# Patient Record
Sex: MALE | Race: BLACK OR AFRICAN AMERICAN | NOT HISPANIC OR LATINO | Employment: OTHER | URBAN - METROPOLITAN AREA
[De-identification: names, ages, dates, MRNs, and addresses within clinical notes are randomized per-mention and may not be internally consistent; named-entity substitution may affect disease eponyms.]

---

## 2018-07-03 ENCOUNTER — TELEPHONE (OUTPATIENT)
Dept: INTERNAL MEDICINE | Facility: CLINIC | Age: 63
End: 2018-07-03

## 2018-07-03 ENCOUNTER — LAB VISIT (OUTPATIENT)
Dept: LAB | Facility: HOSPITAL | Age: 63
End: 2018-07-03
Payer: MEDICARE

## 2018-07-03 ENCOUNTER — OFFICE VISIT (OUTPATIENT)
Dept: INTERNAL MEDICINE | Facility: CLINIC | Age: 63
End: 2018-07-03
Payer: MEDICARE

## 2018-07-03 VITALS
DIASTOLIC BLOOD PRESSURE: 80 MMHG | BODY MASS INDEX: 39.72 KG/M2 | SYSTOLIC BLOOD PRESSURE: 150 MMHG | HEIGHT: 67 IN | TEMPERATURE: 98 F | WEIGHT: 253.06 LBS

## 2018-07-03 DIAGNOSIS — R73.01 IMPAIRED FASTING GLUCOSE: ICD-10-CM

## 2018-07-03 DIAGNOSIS — K91.89 POSTCHOLECYSTECTOMY DIARRHEA: ICD-10-CM

## 2018-07-03 DIAGNOSIS — J31.0 CHRONIC RHINITIS: ICD-10-CM

## 2018-07-03 DIAGNOSIS — R63.4 UNINTENTIONAL WEIGHT LOSS: ICD-10-CM

## 2018-07-03 DIAGNOSIS — I10 HYPERTENSION, UNSPECIFIED TYPE: ICD-10-CM

## 2018-07-03 DIAGNOSIS — E78.2 MIXED HYPERLIPIDEMIA: ICD-10-CM

## 2018-07-03 DIAGNOSIS — R11.0 NAUSEA: ICD-10-CM

## 2018-07-03 DIAGNOSIS — Z11.59 NEED FOR HEPATITIS C SCREENING TEST: ICD-10-CM

## 2018-07-03 DIAGNOSIS — G47.30 SLEEP APNEA, UNSPECIFIED TYPE: ICD-10-CM

## 2018-07-03 DIAGNOSIS — E66.01 MORBID OBESITY: ICD-10-CM

## 2018-07-03 DIAGNOSIS — R19.7 POSTCHOLECYSTECTOMY DIARRHEA: ICD-10-CM

## 2018-07-03 DIAGNOSIS — Z00.00 ENCOUNTER FOR HEALTH MAINTENANCE EXAMINATION IN ADULT: Primary | ICD-10-CM

## 2018-07-03 DIAGNOSIS — Z23 NEED FOR TDAP VACCINATION: ICD-10-CM

## 2018-07-03 DIAGNOSIS — E87.6 HYPOKALEMIA: Primary | ICD-10-CM

## 2018-07-03 DIAGNOSIS — Z12.11 COLON CANCER SCREENING: ICD-10-CM

## 2018-07-03 LAB
ALBUMIN SERPL BCP-MCNC: 3.9 G/DL
ALP SERPL-CCNC: 50 U/L
ALT SERPL W/O P-5'-P-CCNC: 25 U/L
ANION GAP SERPL CALC-SCNC: 9 MMOL/L
AST SERPL-CCNC: 35 U/L
BASOPHILS # BLD AUTO: 0.04 K/UL
BASOPHILS NFR BLD: 0.9 %
BILIRUB SERPL-MCNC: 0.4 MG/DL
BILIRUB UR QL STRIP: NEGATIVE
BUN SERPL-MCNC: 17 MG/DL
CALCIUM SERPL-MCNC: 9.9 MG/DL
CHLORIDE SERPL-SCNC: 106 MMOL/L
CHOLEST SERPL-MCNC: 261 MG/DL
CHOLEST/HDLC SERPL: 5.2 {RATIO}
CLARITY UR REFRACT.AUTO: CLEAR
CO2 SERPL-SCNC: 24 MMOL/L
COLOR UR AUTO: NORMAL
CREAT SERPL-MCNC: 1.2 MG/DL
DIFFERENTIAL METHOD: ABNORMAL
EOSINOPHIL # BLD AUTO: 0.2 K/UL
EOSINOPHIL NFR BLD: 5.6 %
ERYTHROCYTE [DISTWIDTH] IN BLOOD BY AUTOMATED COUNT: 13.5 %
EST. GFR  (AFRICAN AMERICAN): >60 ML/MIN/1.73 M^2
EST. GFR  (NON AFRICAN AMERICAN): >60 ML/MIN/1.73 M^2
ESTIMATED AVG GLUCOSE: 128 MG/DL
GLUCOSE SERPL-MCNC: 107 MG/DL
GLUCOSE UR QL STRIP: NEGATIVE
HBA1C MFR BLD HPLC: 6.1 %
HCT VFR BLD AUTO: 37.3 %
HCV AB SERPL QL IA: NEGATIVE
HDLC SERPL-MCNC: 50 MG/DL
HDLC SERPL: 19.2 %
HGB BLD-MCNC: 12.2 G/DL
HGB UR QL STRIP: NEGATIVE
KETONES UR QL STRIP: NEGATIVE
LDLC SERPL CALC-MCNC: 175.2 MG/DL
LEUKOCYTE ESTERASE UR QL STRIP: NEGATIVE
LYMPHOCYTES # BLD AUTO: 1.2 K/UL
LYMPHOCYTES NFR BLD: 27.9 %
MCH RBC QN AUTO: 31.4 PG
MCHC RBC AUTO-ENTMCNC: 32.7 G/DL
MCV RBC AUTO: 96 FL
MONOCYTES # BLD AUTO: 0.4 K/UL
MONOCYTES NFR BLD: 10.1 %
NEUTROPHILS # BLD AUTO: 2.4 K/UL
NEUTROPHILS NFR BLD: 55.3 %
NITRITE UR QL STRIP: NEGATIVE
NONHDLC SERPL-MCNC: 211 MG/DL
NRBC BLD-RTO: 0 /100 WBC
PH UR STRIP: 7 [PH] (ref 5–8)
PLATELET # BLD AUTO: 272 K/UL
PMV BLD AUTO: 10.6 FL
POTASSIUM SERPL-SCNC: 3.2 MMOL/L
PROT SERPL-MCNC: 7.8 G/DL
PROT UR QL STRIP: NEGATIVE
RBC # BLD AUTO: 3.89 M/UL
SODIUM SERPL-SCNC: 139 MMOL/L
SP GR UR STRIP: 1.01 (ref 1–1.03)
T4 FREE SERPL-MCNC: 0.83 NG/DL
TRIGL SERPL-MCNC: 179 MG/DL
TSH SERPL DL<=0.005 MIU/L-ACNC: 0.3 UIU/ML
URN SPEC COLLECT METH UR: NORMAL
UROBILINOGEN UR STRIP-ACNC: NEGATIVE EU/DL
WBC # BLD AUTO: 4.26 K/UL

## 2018-07-03 PROCEDURE — 80061 LIPID PANEL: CPT

## 2018-07-03 PROCEDURE — 80053 COMPREHEN METABOLIC PANEL: CPT

## 2018-07-03 PROCEDURE — 85025 COMPLETE CBC W/AUTO DIFF WBC: CPT

## 2018-07-03 PROCEDURE — 81003 URINALYSIS AUTO W/O SCOPE: CPT

## 2018-07-03 PROCEDURE — 3079F DIAST BP 80-89 MM HG: CPT | Mod: CPTII,S$GLB,, | Performed by: NURSE PRACTITIONER

## 2018-07-03 PROCEDURE — 86803 HEPATITIS C AB TEST: CPT

## 2018-07-03 PROCEDURE — 99999 PR PBB SHADOW E&M-EST. PATIENT-LVL IV: CPT | Mod: PBBFAC,,, | Performed by: NURSE PRACTITIONER

## 2018-07-03 PROCEDURE — 3077F SYST BP >= 140 MM HG: CPT | Mod: CPTII,S$GLB,, | Performed by: NURSE PRACTITIONER

## 2018-07-03 PROCEDURE — 99214 OFFICE O/P EST MOD 30 MIN: CPT | Mod: S$GLB,,, | Performed by: NURSE PRACTITIONER

## 2018-07-03 PROCEDURE — 84443 ASSAY THYROID STIM HORMONE: CPT

## 2018-07-03 PROCEDURE — 36415 COLL VENOUS BLD VENIPUNCTURE: CPT

## 2018-07-03 PROCEDURE — 83036 HEMOGLOBIN GLYCOSYLATED A1C: CPT

## 2018-07-03 PROCEDURE — 84439 ASSAY OF FREE THYROXINE: CPT

## 2018-07-03 RX ORDER — IBUPROFEN 800 MG/1
800 TABLET ORAL DAILY
Refills: 3 | COMMUNITY
Start: 2018-05-09 | End: 2018-07-03 | Stop reason: ALTCHOICE

## 2018-07-03 RX ORDER — CHOLESTYRAMINE 4 G/9G
POWDER, FOR SUSPENSION ORAL
Qty: 60 G | Refills: 5 | Status: SHIPPED | OUTPATIENT
Start: 2018-07-03 | End: 2019-08-20 | Stop reason: SDUPTHER

## 2018-07-03 RX ORDER — POTASSIUM CHLORIDE 750 MG/1
10 CAPSULE, EXTENDED RELEASE ORAL DAILY
Qty: 30 CAPSULE | Refills: 2 | Status: SHIPPED | OUTPATIENT
Start: 2018-07-03 | End: 2019-01-09

## 2018-07-03 RX ORDER — AMLODIPINE BESYLATE 10 MG/1
10 TABLET ORAL DAILY
Qty: 90 TABLET | Refills: 1 | Status: SHIPPED | OUTPATIENT
Start: 2018-07-03 | End: 2018-07-23 | Stop reason: SDUPTHER

## 2018-07-03 RX ORDER — LOSARTAN POTASSIUM AND HYDROCHLOROTHIAZIDE 25; 100 MG/1; MG/1
TABLET ORAL
Qty: 90 TABLET | Refills: 1 | Status: SHIPPED | OUTPATIENT
Start: 2018-07-03 | End: 2018-07-23 | Stop reason: SDUPTHER

## 2018-07-03 RX ORDER — LOVASTATIN 40 MG/1
TABLET ORAL
Qty: 90 TABLET | Refills: 1 | Status: SHIPPED | OUTPATIENT
Start: 2018-07-03 | End: 2018-07-23

## 2018-07-03 RX ORDER — LOVASTATIN 40 MG/1
TABLET ORAL
Refills: 4 | COMMUNITY
Start: 2018-05-09 | End: 2018-07-03 | Stop reason: SDUPTHER

## 2018-07-03 RX ORDER — LOSARTAN POTASSIUM AND HYDROCHLOROTHIAZIDE 25; 100 MG/1; MG/1
TABLET ORAL
Refills: 4 | COMMUNITY
Start: 2018-05-09 | End: 2018-07-03 | Stop reason: SDUPTHER

## 2018-07-03 RX ORDER — AMLODIPINE BESYLATE 10 MG/1
10 TABLET ORAL DAILY
COMMUNITY
End: 2018-07-03 | Stop reason: SDUPTHER

## 2018-07-03 RX ORDER — IBUPROFEN 800 MG/1
800 TABLET ORAL DAILY
Qty: 30 TABLET | Refills: 3 | Status: CANCELLED | OUTPATIENT
Start: 2018-07-03

## 2018-07-03 NOTE — TELEPHONE ENCOUNTER
----- Message from Tina Novak DNP sent at 7/3/2018  1:19 PM CDT -----  Call pt with lab results. Cholesterol is high, total 261(goal < 200), (goal < 100), Triglycerides 179, goal < 150. Continue cholesterol med, follow low fat, low CHO, high fiber diet and exercise. A1C=6.1, prediabetes, follow low fat, low carb diet, limit starches and simple sugars to prevent the development of T2DM.    Thyroid function was a little off, will need to recheck this in 6 months.  CBC showed mild anemia, nothing concerning. Potassium was low on CMP but liver and kidney function normal. Sent in script for potassium 10meq daily.    F/U with new MD PCP as scheduled for further management.

## 2018-07-03 NOTE — PROGRESS NOTES
Subjective:       Patient ID: Isreal Randolph is a 62 y.o. male.    Chief Complaint: Weight Loss (20lbs in 3 weeks); Dizziness; and Nausea    Pt presents to clinic, new to me, for Physical. Last seen 8-21-14 by a Dr. Chong.    He has multiple complaints. He has not been seen in a while, had lost insurance since 4 yrs ago seeing PCP. Was getting meds filled at [facility].    He complaints of losing 30 lbs in 3 weeks, nausea, and dizziness. Denies abd pain, fever, or chills. Pt states he has an urge to vomit, but he hasn't. States it happens every morning.     Pt complaints of ringing in his ears for the last 2-3 weeks.      Review of Systems   Constitutional: Positive for unexpected weight change. Negative for activity change, appetite change, chills, diaphoresis, fatigue and fever.   HENT: Positive for tinnitus. Negative for ear discharge and ear pain.    Eyes: Negative.  Negative for visual disturbance.   Respiratory: Negative.  Negative for cough, shortness of breath and wheezing.    Cardiovascular: Negative.  Negative for palpitations and leg swelling.   Gastrointestinal: Positive for nausea. Negative for abdominal distention, abdominal pain, anal bleeding, blood in stool, constipation, diarrhea, rectal pain and vomiting.   Endocrine: Negative.  Negative for cold intolerance, heat intolerance, polydipsia, polyphagia and polyuria.   Genitourinary: Negative.  Negative for dysuria and hematuria.   Musculoskeletal: Positive for back pain.        Reports hx of sciatica with pain, was taking ibuprofen 800mg TID   Skin: Negative.  Negative for color change, pallor, rash and wound.   Allergic/Immunologic: Negative.  Negative for environmental allergies, food allergies and immunocompromised state.   Neurological: Positive for dizziness. Negative for weakness, numbness and headaches.   Hematological: Negative.  Negative for adenopathy. Does not bruise/bleed easily.   Psychiatric/Behavioral: Negative.  Negative  for behavioral problems and suicidal ideas.       Review of patient's allergies indicates:   Allergen Reactions    Lactose intolerance  [lactase]      Other reaction(s): Diarrhea    Penicillins Other (See Comments)     Swelling of lymph nodes       Current Outpatient Prescriptions:     aluminum hydroxide-magnesium carbonate (GAVISCON) suspension, Take 5 mLs by mouth every 6 (six) hours as needed., Disp: , Rfl:     amLODIPine (NORVASC) 10 MG tablet, Take 10 mg by mouth once daily., Disp: , Rfl:     cetirizine (ZYRTEC) 10 MG tablet, Take 10 mg by mouth once daily., Disp: , Rfl:     cholestyramine, with sugar, 4 gram Powd, MIX 1 PACKET IN AT LEAST 6 OUNCES OF LIQUID AND TAKE BY MOUTH TWICE DAILY WITH MEALS, Disp: 60 g, Rfl: 0    diphenhydrAMINE (BENADRYL) 25 mg capsule, Take by mouth. 1 Capsule Oral Every evening, Disp: , Rfl:     ibuprofen (ADVIL,MOTRIN) 800 MG tablet, Take 800 mg by mouth once daily., Disp: , Rfl: 3    losartan-hydrochlorothiazide 100-25 mg (HYZAAR) 100-25 mg per tablet, TAKE ONE(1) TABLET BY MOUTH EVERY DAY, Disp: , Rfl: 4    lovastatin (MEVACOR) 40 MG tablet, TAKE 1 TABLET BY ORAL ROUTE 1 TIME PER DAY AT BEDTIME FOR CHOLESTEROL, Disp: , Rfl: 4    Patient Active Problem List   Diagnosis    Hypertension    Sleep apnea    Postcholecystectomy diarrhea    Chronic rhinitis    Inguinal hernia    Mixed hyperlipidemia    Morbid obesity    Impaired fasting glucose       Past Medical History:   Diagnosis Date    Allergy     Back pain     Hypertension     Mixed hyperlipidemia 7/3/2018    Obesity     Reflux     Sleep apnea 6/21/2013     Past Surgical History:   Procedure Laterality Date    CHOLECYSTECTOMY      HERNIA REPAIR       Social History     Social History    Marital status:      Spouse name: N/A    Number of children: N/A    Years of education: N/A     Occupational History     Local      Social History Main Topics    Smoking status: Never Smoker    Smokeless  "tobacco: Never Used    Alcohol use Yes      Comment: 3 times per week    Drug use: No    Sexual activity: Yes     Other Topics Concern    None     Social History Narrative    None       Family History   Problem Relation Age of Onset    Cancer Mother     Heart disease Father          Objective:     Vitals:    07/03/18 0750 07/03/18 0823   BP: (!) 158/82 (!) 150/80   Temp: 98.1 °F (36.7 °C)    Weight: 114.8 kg (253 lb 1.4 oz)    Height: 5' 6.5" (1.689 m)    PainSc: 0-No pain        Body mass index is 40.24 kg/m².    Physical Exam   Constitutional: He is oriented to person, place, and time. He appears well-developed and well-nourished.   Morbidly obese   HENT:   Head: Normocephalic.   Right Ear: Hearing, tympanic membrane, external ear and ear canal normal.   Left Ear: Hearing, tympanic membrane, external ear and ear canal normal.   Eyes: Conjunctivae, EOM and lids are normal. Pupils are equal, round, and reactive to light. Lids are everted and swept, no foreign bodies found.   Neck: Trachea normal, normal range of motion and full passive range of motion without pain. Neck supple. No JVD present. Carotid bruit is not present.   Cardiovascular: Normal rate, regular rhythm, normal heart sounds, intact distal pulses and normal pulses.    Pulmonary/Chest: Effort normal and breath sounds normal.   Abdominal: Soft. Normal appearance and bowel sounds are normal. He exhibits no distension and no mass. There is no hepatosplenomegaly. There is no tenderness. There is no rebound, no guarding and no CVA tenderness. No hernia.   obese   Musculoskeletal: Normal range of motion.   Neurological: He is alert and oriented to person, place, and time.   Skin: Skin is warm, dry and intact. Capillary refill takes less than 2 seconds.   Psychiatric: He has a normal mood and affect. His speech is normal and behavior is normal. Judgment and thought content normal. Cognition and memory are normal.   Vitals reviewed.      Assessment: "     1. Encounter for health maintenance examination in adult     2. Mixed hyperlipidemia  Lipid panel   3. Hypertension, unspecified type  CBC auto differential    Comprehensive metabolic panel    TSH    Urinalysis   4. Postcholecystectomy diarrhea  Case request GI: COLONOSCOPY   5. Chronic rhinitis     6. Sleep apnea, unspecified type     7. Morbid obesity     8. BMI 40.0-44.9, adult     9. Unintentional weight loss  Hemoglobin A1c   10. Nausea     11. Need for hepatitis C screening test  Hepatitis C antibody   12. Colon cancer screening  Case request GI: COLONOSCOPY   13. Need for Tdap vaccination     14. Impaired fasting glucose  Hemoglobin A1c       Plan:     Isreal was seen today for weight loss, dizziness and nausea.    Diagnoses and all orders for this visit:    Encounter for health maintenance examination in adult  Exam done    Health Maintenance updated    Mixed hyperlipidemia  -     Lipid panel; Future   Refilled meds    Continue cholesterol med, low fat diet, and exercise. Check lipids.    Hypertension, unspecified type  Elevated    Refilled meds    Take medications as prescribed.    Monitor BP at home, goal BP < or = 140/80, call office if consistently above this range.    Follow low salt DASH diet and exercise.    BMI reviewed.    Go to ED if Headaches, blurred vision, chest pain, or SOB occurs along with elevated readings > or = 160/90.      Postcholecystectomy diarrhea  -     Case request GI: COLONOSCOPY    Referred to GI    Chronic rhinitis  Continue otc flonase and Zyrtec prn, allergen avoidance discussed    Sleep apnea, unspecified type  Stable, on CPAP    Morbid obesity  BMI reviewed.    Diet and exercise to lose weight.    BMI 40.0-44.9, adult  BMI reviewed.    Diet and exercise to lose weight.    Unintentional weight loss  -     Hemoglobin A1c; Future  Referred to GI    Nausea  Clear Liquid diet, advance to BRAT as tolerated    Referred to GI  Need for hepatitis C screening test  -      Hepatitis C antibody; Future    Colon cancer screening  -     Case request GI: COLONOSCOPY    Need for Tdap vaccination  Given in pharmacy    Impaired fasting glucose  -     Hemoglobin A1c; Future    Labs ordered and drawn today, will call with results    Need f/u with MD to establish PCP, Dr. Hills in 2 weeks    Colonoscopy due, ordered--to find out if any colon causes of nausea as this screening is overdue    Follow low residue diet, clear liquids, until you can tolerate soft foods for nausea    Stop Ibuprofen for pain, take Tylenol 500mg prn pain    Tdap given in pharmacy    Refilled meds

## 2018-07-03 NOTE — PROGRESS NOTES
Call pt with lab results. Cholesterol is high, total 261(goal < 200), (goal < 100), Triglycerides 179, goal < 150. Continue cholesterol med, follow low fat, low CHO, high fiber diet and exercise. A1C=6.1, prediabetes, follow low fat, low carb diet, limit starches and simple sugars to prevent the development of T2DM.    Thyroid function was a little off, will need to recheck this in 6 months.  CBC showed mild anemia, nothing concerning. Potassium was low on CMP but liver and kidney function normal. Sent in script for potassium 10meq daily.    F/U with new MD PCP as scheduled for further management.

## 2018-07-03 NOTE — PATIENT INSTRUCTIONS
Labs ordered and drawn today, will call with results    Need f/u with MD to establish PCP, Dr. Hills in 2 weeks    Colonoscopy due, ordered--to find out if any colon causes of nausea as this screening is overdue    Follow low residue diet, clear liquids, until you can tolerate soft foods for nausea    Stop Ibuprofen for pain, take Tylenol 500mg prn pain    Tdap given in pharmacy    Refilled meds

## 2018-07-10 ENCOUNTER — TELEPHONE (OUTPATIENT)
Dept: INTERNAL MEDICINE | Facility: CLINIC | Age: 63
End: 2018-07-10

## 2018-07-23 ENCOUNTER — OFFICE VISIT (OUTPATIENT)
Dept: INTERNAL MEDICINE | Facility: CLINIC | Age: 63
End: 2018-07-23
Attending: INTERNAL MEDICINE
Payer: MEDICARE

## 2018-07-23 VITALS
DIASTOLIC BLOOD PRESSURE: 74 MMHG | SYSTOLIC BLOOD PRESSURE: 130 MMHG | HEIGHT: 67 IN | WEIGHT: 249.13 LBS | BODY MASS INDEX: 39.1 KG/M2 | HEART RATE: 48 BPM

## 2018-07-23 DIAGNOSIS — R79.89 LOW TSH LEVEL: ICD-10-CM

## 2018-07-23 DIAGNOSIS — I10 ESSENTIAL HYPERTENSION: ICD-10-CM

## 2018-07-23 DIAGNOSIS — G89.29 CHRONIC MIDLINE LOW BACK PAIN WITH LEFT-SIDED SCIATICA: ICD-10-CM

## 2018-07-23 DIAGNOSIS — E78.2 MIXED HYPERLIPIDEMIA: ICD-10-CM

## 2018-07-23 DIAGNOSIS — R00.1 BRADYCARDIA: Primary | ICD-10-CM

## 2018-07-23 DIAGNOSIS — E66.01 SEVERE OBESITY (BMI 35.0-39.9) WITH COMORBIDITY: ICD-10-CM

## 2018-07-23 DIAGNOSIS — E87.6 HYPOKALEMIA: ICD-10-CM

## 2018-07-23 DIAGNOSIS — R00.1 SINUS BRADYCARDIA: ICD-10-CM

## 2018-07-23 DIAGNOSIS — M54.42 CHRONIC MIDLINE LOW BACK PAIN WITH LEFT-SIDED SCIATICA: ICD-10-CM

## 2018-07-23 PROCEDURE — 93005 ELECTROCARDIOGRAM TRACING: CPT | Mod: S$GLB,,, | Performed by: INTERNAL MEDICINE

## 2018-07-23 PROCEDURE — 3008F BODY MASS INDEX DOCD: CPT | Mod: CPTII,S$GLB,, | Performed by: INTERNAL MEDICINE

## 2018-07-23 PROCEDURE — 99215 OFFICE O/P EST HI 40 MIN: CPT | Mod: 25,S$GLB,, | Performed by: INTERNAL MEDICINE

## 2018-07-23 PROCEDURE — 3078F DIAST BP <80 MM HG: CPT | Mod: CPTII,S$GLB,, | Performed by: INTERNAL MEDICINE

## 2018-07-23 PROCEDURE — 93010 ELECTROCARDIOGRAM REPORT: CPT | Mod: S$GLB,,, | Performed by: INTERNAL MEDICINE

## 2018-07-23 PROCEDURE — 99999 PR PBB SHADOW E&M-EST. PATIENT-LVL III: CPT | Mod: PBBFAC,,, | Performed by: INTERNAL MEDICINE

## 2018-07-23 PROCEDURE — 3075F SYST BP GE 130 - 139MM HG: CPT | Mod: CPTII,S$GLB,, | Performed by: INTERNAL MEDICINE

## 2018-07-23 RX ORDER — LOSARTAN POTASSIUM AND HYDROCHLOROTHIAZIDE 25; 100 MG/1; MG/1
TABLET ORAL
Qty: 90 TABLET | Refills: 3 | Status: SHIPPED | OUTPATIENT
Start: 2018-07-23 | End: 2019-01-09

## 2018-07-23 RX ORDER — ATORVASTATIN CALCIUM 80 MG/1
80 TABLET, FILM COATED ORAL DAILY
Qty: 90 TABLET | Refills: 3 | Status: SHIPPED | OUTPATIENT
Start: 2018-07-23 | End: 2019-11-18 | Stop reason: ALTCHOICE

## 2018-07-23 RX ORDER — AMLODIPINE BESYLATE 10 MG/1
10 TABLET ORAL DAILY
Qty: 90 TABLET | Refills: 3 | Status: SHIPPED | OUTPATIENT
Start: 2018-07-23 | End: 2020-01-06 | Stop reason: SDUPTHER

## 2018-07-23 RX ORDER — IBUPROFEN 800 MG/1
800 TABLET ORAL 3 TIMES DAILY PRN
Qty: 60 TABLET | Refills: 3 | Status: SHIPPED | OUTPATIENT
Start: 2018-07-23 | End: 2018-08-02

## 2018-07-23 RX ORDER — LOVASTATIN 40 MG/1
TABLET ORAL
Qty: 90 TABLET | Refills: 1 | Status: CANCELLED | OUTPATIENT
Start: 2018-07-23

## 2018-07-23 NOTE — PROGRESS NOTES
"Subjective:       Patient ID: Isreal Randolph is a 62 y.o. male.    Chief Complaint: Establish Care    HPI    First visit with me. without insurance last 3 years. Dx with sciatica. Was seen at outside clinic. Was 280#, now in 240s. Has been out of blood pressure meds and cholesterol meds. Reports obstructive sleep apnea resolved after weight loss.     Reports using ibuprofen for treatment of sciatica. Uses as needed, not all the time. Takes Gaveston over-the-counter for GERD, not too much of a problem.    Bradycardia hasn't been a problem in the past. Pt is not weak or dizzy. occasionally lightheadedness when working outside and dehydration.    Review of Systems   All other systems reviewed and are negative.        Objective:      Physical Exam   Constitutional: He is oriented to person, place, and time. No distress.   -American man whose Body mass index is 39.61 kg/m².    Cardiovascular: An irregularly irregular rhythm present. Bradycardia present.  Exam reveals distant heart sounds.    Pulmonary/Chest: Effort normal and breath sounds normal. He has no wheezes. He has no rales.   Neurological: He is alert and oriented to person, place, and time.   Skin: Skin is warm and dry. No rash noted.   Psychiatric: He has a normal mood and affect. His behavior is normal.   Nursing note and vitals reviewed.      Vitals:    07/23/18 1020   BP: 130/74   BP Location: Right arm   Patient Position: Sitting   BP Method: Large (Manual)   Pulse: (!) 48   Weight: 113 kg (249 lb 1.9 oz)   Height: 5' 6.5" (1.689 m)     Body mass index is 39.61 kg/m².    RESULTS: Reviewed labs from last 1 months. I have personally inspected the EKG performed today.     Assessment:       1. Bradycardia    2. Mixed hyperlipidemia    3. Essential hypertension    4. Chronic midline low back pain with left-sided sciatica    5. Low TSH level    6. Hypokalemia    7. Severe obesity (BMI 35.0-39.9) with comorbidity    8. Sinus bradycardia        Plan: "   Isreal was seen today for establish care.    Diagnoses and all orders for this visit:    Bradycardia:  New problem. EKG shows sinus bradycardia, no sign of heart block. Send to lab to recheck electrolytes. Refer to Cardiology.  -     EKG 12-lead    Mixed hyperlipidemia:  Prior dx, not well controlled, no longer on meds. Change lovastatin to atorvastatin, recheck levels next visit.  -     atorvastatin (LIPITOR) 80 MG tablet; Take 1 tablet (80 mg total) by mouth once daily.  -     Lipid panel; Future    Essential hypertension:  Prior dx, reports has been well controlled on prior management but now out of meds, restart and if low blood pressure develops please notify the office.  -     amLODIPine (NORVASC) 10 MG tablet; Take 1 tablet (10 mg total) by mouth once daily.  -     losartan-hydrochlorothiazide 100-25 mg (HYZAAR) 100-25 mg per tablet; TAKE ONE(1) TABLET BY MOUTH EVERY DAY  -     Magnesium; Future  -     Comprehensive metabolic panel; Future    Chronic midline low back pain with left-sided sciatica:  Prior diagnosis, well controlled on current management. Continue ibuprofen PRN. Continue weight loss.   -     ibuprofen (ADVIL,MOTRIN) 800 MG tablet; Take 1 tablet (800 mg total) by mouth 3 (three) times daily as needed (sciatica).    Low TSH level:  New problem, seen on recent labs. Free T4 normal, no symptoms of hyperthyroidism. Recheck next set of labs.  -     TSH; Future    Hypokalemia:  New problem, seen on recent labs, restart ARB with HCTZ and recheck levels with next labs along with Mg.    Severe obesity:  Wt coming down with some more exercise. Pt wants to lose more wt, refer to health .    Other orders  -     Cancel: lovastatin (MEVACOR) 40 MG tablet; TAKE 1 TABLET BY ORAL ROUTE 1 TIME PER DAY AT BEDTIME FOR CHOLESTEROL    Follow-up in about 3 months (around 10/23/2018) for fasting labs 1 week prior, HOLLY Novak.  Donald Hills MD  Internal Medicine    Portions of this note were completed  using medical dictation software. Please excuse typographical or syntax errors that were missed on review.

## 2018-07-24 ENCOUNTER — OFFICE VISIT (OUTPATIENT)
Dept: CARDIOLOGY | Facility: CLINIC | Age: 63
End: 2018-07-24
Payer: MEDICARE

## 2018-07-24 VITALS
HEIGHT: 66 IN | HEART RATE: 50 BPM | WEIGHT: 252.19 LBS | SYSTOLIC BLOOD PRESSURE: 130 MMHG | DIASTOLIC BLOOD PRESSURE: 80 MMHG | BODY MASS INDEX: 40.53 KG/M2

## 2018-07-24 DIAGNOSIS — R00.1 SINUS BRADYCARDIA: Primary | ICD-10-CM

## 2018-07-24 DIAGNOSIS — R73.01 IMPAIRED FASTING GLUCOSE: ICD-10-CM

## 2018-07-24 DIAGNOSIS — E66.01 SEVERE OBESITY (BMI 35.0-39.9) WITH COMORBIDITY: ICD-10-CM

## 2018-07-24 DIAGNOSIS — I10 ESSENTIAL HYPERTENSION: ICD-10-CM

## 2018-07-24 DIAGNOSIS — G47.30 SLEEP APNEA, UNSPECIFIED TYPE: ICD-10-CM

## 2018-07-24 PROCEDURE — 99999 PR PBB SHADOW E&M-EST. PATIENT-LVL III: CPT | Mod: PBBFAC,,, | Performed by: INTERNAL MEDICINE

## 2018-07-24 PROCEDURE — 99204 OFFICE O/P NEW MOD 45 MIN: CPT | Mod: S$GLB,,, | Performed by: INTERNAL MEDICINE

## 2018-07-24 PROCEDURE — 3079F DIAST BP 80-89 MM HG: CPT | Mod: CPTII,S$GLB,, | Performed by: INTERNAL MEDICINE

## 2018-07-24 PROCEDURE — 3008F BODY MASS INDEX DOCD: CPT | Mod: CPTII,S$GLB,, | Performed by: INTERNAL MEDICINE

## 2018-07-24 PROCEDURE — 3075F SYST BP GE 130 - 139MM HG: CPT | Mod: CPTII,S$GLB,, | Performed by: INTERNAL MEDICINE

## 2018-07-24 PROCEDURE — 99499 UNLISTED E&M SERVICE: CPT | Mod: S$GLB,,, | Performed by: INTERNAL MEDICINE

## 2018-07-24 NOTE — PROGRESS NOTES
Subjective:   Patient ID:  Isreal Randolph is a 62 y.o. male who presents for evaluation of Bradycardia      HPI: Very pleasant man with severe obesity, HTN, dyslipidemia, and impaired fasting glucose here for evaluation of his slow heart rates.  He has ECGs with sinus stephanie at 44.  He feels perfectly well - in fact, he feels better lately with more weight loss than he has in a while.  He's had no new lightheadedness or syncope, and no new HOFFMAN.  No angina, PND, or orthopnea.  He takes no SA or AV carlito agents.    He was recently started on atorvastatin 80 for an LDL of 175-205 that had long been untreated.  He's recently come off of amlodipine as his BP responds to weight loss.    Past Medical History:   Diagnosis Date    Allergy     Back pain     Hypertension     Mixed hyperlipidemia 7/3/2018    Obesity     Reflux     Sleep apnea 6/21/2013       Past Surgical History:   Procedure Laterality Date    CHOLECYSTECTOMY      HERNIA REPAIR         Social History   Substance Use Topics    Smoking status: Never Smoker    Smokeless tobacco: Never Used    Alcohol use Yes      Comment: 3 times per week       Family History   Problem Relation Age of Onset    Cancer Mother     Heart disease Father        Current Outpatient Prescriptions   Medication Sig    aluminum hydroxide-magnesium carbonate (GAVISCON) suspension Take 5 mLs by mouth every 6 (six) hours as needed.    atorvastatin (LIPITOR) 80 MG tablet Take 1 tablet (80 mg total) by mouth once daily.    cetirizine (ZYRTEC) 10 MG tablet Take 10 mg by mouth once daily.    cholestyramine, with sugar, 4 gram Powd MIX 1 PACKET IN AT LEAST 6 OUNCES OF LIQUID AND TAKE BY MOUTH TWICE DAILY WITH MEALS    diphenhydrAMINE (BENADRYL) 25 mg capsule Take by mouth. 1 Capsule Oral Every evening    ibuprofen (ADVIL,MOTRIN) 800 MG tablet Take 1 tablet (800 mg total) by mouth 3 (three) times daily as needed (sciatica).    losartan-hydrochlorothiazide 100-25 mg (HYZAAR) 100-25  mg per tablet TAKE ONE(1) TABLET BY MOUTH EVERY DAY    potassium chloride (MICRO-K) 10 MEQ CpSR Take 1 capsule (10 mEq total) by mouth once daily.    amLODIPine (NORVASC) 10 MG tablet Take 1 tablet (10 mg total) by mouth once daily.    diphth,pertus,acell,,tetanus (BOOSTRIX) 2.5-8-5 Lf-mcg-Lf/0.5mL Syrg injection Inject into the muscle.     No current facility-administered medications for this visit.        Review of patient's allergies indicates:   Allergen Reactions    Lactose intolerance  [lactase]      Other reaction(s): Diarrhea    Penicillins Other (See Comments)     Swelling of lymph nodes       Review of Systems   Constitution: Negative.   HENT: Negative.    Eyes: Negative.    Cardiovascular: Negative.  Negative for chest pain, dyspnea on exertion, near-syncope, orthopnea and palpitations.   Respiratory: Negative.  Negative for cough, hemoptysis and shortness of breath.    Endocrine: Negative.    Hematologic/Lymphatic: Negative.    Skin: Negative.    Musculoskeletal: Negative.    Gastrointestinal: Negative.    Genitourinary: Negative.    Neurological: Negative.    Psychiatric/Behavioral: Negative.      Objective:   Physical Exam   Constitutional: He is oriented to person, place, and time. He appears well-developed and well-nourished.   HENT:   Head: Normocephalic and atraumatic.   Mouth/Throat: Oropharynx is clear and moist.   Eyes: Conjunctivae and EOM are normal. No scleral icterus.   Neck: Normal range of motion. Neck supple. No JVD present.   Cardiovascular: Regular rhythm, normal heart sounds and intact distal pulses.  Exam reveals no gallop and no friction rub.    No murmur heard.  Rate = 50bpm     Pulmonary/Chest: Effort normal and breath sounds normal. He has no wheezes. He has no rales.   Abdominal: Soft. Bowel sounds are normal. He exhibits no distension. There is no tenderness.   Musculoskeletal: Normal range of motion. He exhibits no edema.   Neurological: He is alert and oriented to person,  place, and time.   Skin: Skin is warm and dry. No rash noted. No erythema.   Psychiatric: He has a normal mood and affect. His behavior is normal. Judgment and thought content normal.   Vitals reviewed.      Lab Results   Component Value Date    WBC 4.26 07/03/2018    HGB 12.2 (L) 07/03/2018    HCT 37.3 (L) 07/03/2018    MCV 96 07/03/2018     07/03/2018         Chemistry        Component Value Date/Time     07/23/2018 1112    K 3.3 (L) 07/23/2018 1112     07/23/2018 1112    CO2 26 07/23/2018 1112    BUN 25 (H) 07/23/2018 1112    CREATININE 1.6 (H) 07/23/2018 1112     07/23/2018 1112        Component Value Date/Time    CALCIUM 9.6 07/23/2018 1112    ALKPHOS 50 (L) 07/03/2018 0839    AST 35 07/03/2018 0839    ALT 25 07/03/2018 0839    BILITOT 0.4 07/03/2018 0839    ESTGFRAFRICA 52 (A) 07/23/2018 1112    EGFRNONAA 45 (A) 07/23/2018 1112            Lab Results   Component Value Date    CHOL 261 (H) 07/03/2018    CHOL 253 (H) 03/19/2010    CHOL 281 (H) 01/12/2009     Lab Results   Component Value Date    HDL 50 07/03/2018    HDL 51 03/19/2010    HDL 53 01/12/2009     Lab Results   Component Value Date    LDLCALC 175.2 (H) 07/03/2018    LDLCALC 181.0 (H) 03/19/2010    LDLCALC 205.2 (H) 01/12/2009     Lab Results   Component Value Date    TRIG 179 (H) 07/03/2018    TRIG 105 03/19/2010    TRIG 114 01/12/2009     Lab Results   Component Value Date    CHOLHDL 19.2 (L) 07/03/2018    CHOLHDL 20.2 03/19/2010    CHOLHDL 18.9 (L) 01/12/2009       Lab Results   Component Value Date    TSH 0.305 (L) 07/03/2018       Lab Results   Component Value Date    HGBA1C 6.1 (H) 07/03/2018     I reviewed his ECGs and they are entirely normal except for the bradycardia.  Specifically, the QRS is narrow and there is no evidence of any heart block.    Assessment:     1. Sinus bradycardia    2. Essential hypertension    3. Sleep apnea, unspecified type    4. Severe obesity (BMI 35.0-39.9) with comorbidity    5. Impaired  fasting glucose        Plan:     Reassurance.  Certainly, if he were to develop exercise intolerance, new HOFFMAN, or lightheadedness/syncope, we'd have to investigate further.  But his normal NH interval, QRS duration, and axis are reassuring.    Continue current medicines.    Diet/exercise goals reinforced.    F/U PRN

## 2018-07-24 NOTE — LETTER
July 24, 2018      Donald Hills MD  [address]  [place] [address]           [facility] - Cardiology  [address]  [place] [address]  Phone: [phone]          Patient: Isreal Randolph   MR Number: [id number]   YOB: 1955   Date of Visit: 7/24/2018       Dear Dr. Donald Hills:    Thank you for referring Isreal Randolph to me for evaluation. Attached you will find relevant portions of my assessment and plan of care.    If you have questions, please do not hesitate to call me. I look forward to following Isreal Randolph along with you.    Sincerely,    Virgilio Hernandez MD    Enclosure  CC:  No Recipients    If you would like to receive this communication electronically, please contact [email or web address] or [phone] to request more information on [employer] Link access.    For providers and/or their staff who would like to refer a patient to [facility], please contact us through our one-stop-shop provider referral line, [facility] , at [phone].    If you feel you have received this communication in error or would no longer like to receive these types of communications, please e-mail [email or web address]

## 2018-07-25 ENCOUNTER — PATIENT MESSAGE (OUTPATIENT)
Dept: INTERNAL MEDICINE | Facility: CLINIC | Age: 63
End: 2018-07-25

## 2018-07-25 NOTE — TELEPHONE ENCOUNTER
Please call the patient to notify that his potassium is low but the magnesium is high. I recommend he cut back on Gaviscon to no more than once daily. He also has some decrease in kidney function. I have sent the patient a [facility] message.

## 2018-07-26 ENCOUNTER — TELEPHONE (OUTPATIENT)
Dept: INTERNAL MEDICINE | Facility: CLINIC | Age: 63
End: 2018-07-26

## 2018-07-26 NOTE — TELEPHONE ENCOUNTER
----- Message from Donadl Hills MD sent at 7/23/2018 10:49 AM CDT -----  Regarding: please contact for health coaching  Calos Fay,    Please contact this patient to set up an appointment to meet for Health Coaching. Their major goal is weight loss. Please let me know if there are other questions. Thanks!    Donald

## 2018-07-26 NOTE — TELEPHONE ENCOUNTER
..Patient referred by Dr. Hills for Health coaching (weight loss, lifestyle changes).  Called patient and gave an explanation about Health Coaching program and invited participation.   Patient states he would like to participate.  Set appointment for 8/6/18 at 0900.   Gave direct contact number to call if he/ she has any questions [phone].   Nya Bridges RN  Health

## 2018-08-06 ENCOUNTER — CLINICAL SUPPORT (OUTPATIENT)
Dept: INTERNAL MEDICINE | Facility: CLINIC | Age: 63
End: 2018-08-06
Payer: MEDICARE

## 2018-08-06 VITALS — WEIGHT: 247.81 LBS | BODY MASS INDEX: 40 KG/M2

## 2018-08-06 NOTE — PROGRESS NOTES
Date:    8/6/18                  Time: 0900  Initial Health  Contact - [x]Clinic visit  []  Phone Visit  ASSEMENT: Information obtained through combination of chart review prior to seeing patient, patient self-report.   Primary Referral diagnosis/reason for referral:    Weight loss       (See physician progress note for complete list of diagnoses.)  PCP:   Dr. Hills     Referent :  [x] PCP       [] Transition Navigator    []  E.DAngela    []  APRN  []  Other:     Supports:   Family       Preferred Learning Style  (may be a combination)  [x]  Visual (pictures/ video)     [] Auditory/ Listening   []  Reading    []  Hands-on/ Demonstration   []  1:1    []  Group        []  Alone       []  No preference   []  Combination  []  Other:         Presenting issues from patients point of view:  [x]  Improve nutrition/increase healthy choices.                    []  Improve /maintain current functioning.  [x]  Obtain and maintain healthy blood pressure.                  []  Stop smoking.  [x]  Improve weight management.                                       [x]  Lower cholesterol.  []  Improve blood glucose management.                            []  Improve breathing.  []  Increase energy level.                                                      []  Increase/maintain independence.   []  Improve sleep.                                                                []  Decrease stress.  []  Improve pain management.                                             []  Improve mood.  []  Other:                  Pt. Education Level:     BA Theology     Disease specific education services attended:   no      Patient Employed:  []yes    [x] No  Where ___Retired disability__________________  Days and Hours:                Current Self-help Behaviors  []  Checks glucose level        times a day.  []  Tries to modify meals so more healthy.  []  Exercises by        []  Takes BP        times a       (insert frequency)  [x]  Weighs self    3 x  week     (how often)  [x]  Takes all medications as prescribed.  []  Rarely misses health care appointments.  []  Sleeps 8 hours without waking more than twice.  []  Consistently wears/uses functioning aids as recommended  (ie:  Contacts [] , glasses [] , hearing  Aid [] , prosthesis [] ,  Walker [] ,  Wheelchair []  etc.)  []  Regularly engages in stress management activities I.e.:  []  Quit smoking   [x]  Purposefully educates self about health issues.  []  Pt did bring glucose log with him/her.  []  Other  (explain)           []  Comments:       []  Reported Blood Sugar Readings:          Health screenings   Last PCP visit: 7/23/18                                                PSA: 3/19/2010          Colon: 7/3/18   Lipids: 7/3/18   CMP: 7/3/18   HgA1C: 7/3/18   Urine Microalbumin-Creatinine:  3/19/10           Eye Exam:   [facility] 7/2018 Dr. Duff       Strengths:  [x]  Confident                       [x]  Determined/persistent           []  Enthusiastic         []  Good problem solving           [x]  Good self-control                   []  Hard working        []  Hopeful/optimistic                  []  Intelligent                                []  Self aware  []  Creative                                 [x]  Flexible          [x]  Sense of humor                     []  Open/willing          [x]  Spiritual                                  [x] Values health    []  Successful overcoming difficult challenges in the past.     [x]  Pos support network  [x]   Health literate (The degree to which individuals have the capacity to obtain, process, and    understand basic health information and services needed to make appropriate health decisions.- Dept. of Health and Human services.)  []   Other Comments:             Change Markers  Scale 0-10 with 10 representing most Ready 0 least ready, 10 most important 0 least important 10 most confident 0 least confident.     [] NA at this time.    Readiness:  10   ;  Importance: 10    ;  Confidence:  10      INTERVENTIONS:  [x] Given an explanation about health coaching program and invited participation.  [x] Listened reflectively; provided support, encouragement, and validation; respected  and maintained patient self-direction; explored pros/cons of change; personalized health risks of maintaining the status quo; and facilitated recognition of discrepancy between current behaviors and patients goals and values.  [x] Assessed stage of change and employed appropriate motivational interviewing strategies to facilitate movement toward the next stage of change and healthy behavior modification.  [x] Normalized occurrence of relapse, helped patient recognize outcome of new self-learning as a result of the relapse such as triggers, and helped focus on factors to reduce chances of returning to previous behaviors .  [x] Used readiness scale ratings to guide assessment of importance and confidence of successfully reaching goals.  [x] Assisted patient to develop goal, action plan, and address potential barriers to goal     achievement.  [] Patient was given a new (insert glucose meter or other supplies), taught to use, and      successfully demonstrated ability to carry out essential steps of process.    [] Rx for ( monitor/supplies, pen needles, etc.) was obtained.  [x] Provided educational review, written materials/handouts (Meal Planning Counting Carbs, Healthy Plate, 10 Rules for Eating Safely for Life, 10 Tips to Cutting Your Cravings, Probiotics).   [x] Topics discussed/provided:    GI of foods and how it affects your metabolism and helps you burn fat, carb counting    [x] Facilitated completion of needed exams and screenings by reviewing Diabetic/Health Maintenance Report Card with patient.  [x] Provided pt with my business card.  [x] Informed of/reviewed how to access health  and after hours assistance.  [x] Discussed, planned, and scheduled future  contacts.  [x]Challenges/Barriers identified discussed as identified by patient.  [x] Needs identified by this Health :    Education and support     [] Other:            For additional care management support patient was referred to:        []  Community resources for                        []  Medication assistance programs               []  Dietician              []  Diabetes  [employer]                                        []  Mental health services                           []                  []  Diabetes [place]                                              []  Home health services                                []  Complex case management              []  PCP/covering provider due to                 []  Emergency Dept.                                  []  GYN              []  Optometrist/ Ophthalmologist                          []  Podiatrist                                                      [x]  N/A        []  Other:           RESPONSE/IMPRESSION  Behavior is consistent with the following stage of change:  []  Pre-contemplation  []  Contemplation  [x]  Preparation  []  Action  []  Maintenance  []  Relapse    Level of participation:  []  Refused to participate  []  Guarded / resistant  []  Passive participant  [x]  Active participant/interactive  [x]  Interested/receptive  [x]  Self-motivated  []  Other          Goal developed by patient today:    5 month goal to lose 30 lbs by 12/16/18 10/10  Read every label 7/7  More veggies and fruits (blueberries) 10/10  More water 5 bottles  Day 10/10    Plan (needed actions to accomplish goal):          [x] Pt will work on action plan as stated above.        [x] Pt will follow up with Health  on   8/27/18 at 0800.     [x] Health  will remain available.  [x] Health  will maintain regular contacts with patient to educate, support, encourage; help problem-solve; assist with development of self-advocacy/increasing independent health  management; and provide resources as needed.  [] Pt has declined Health  Program at this time. Provided pt with Health  Program information should they decide to participate at a later time.        [] Pt to facilitate contact with Health        [] Health  to facilitate contact with patient.        [] Other:          Notes:   Met with patient he is interested in weight loss, decrease BP, decrease meds if possible.   He was at a weight loss clinic and lost 80 lbs and had to have his gallbladder removed now is at a plateau.   His best reasons for change are to live longer, its more expensive to be overweight and wants to be around for his kids, and wife, has a 18 yr old daughter in college.   Goals set by patient and will continue to work with him to assist to reach his goals.     Best Method of Contact:  [] email:   [x] Phone:   [] Mail  [] Office     Nya Bridges RN

## 2018-08-27 ENCOUNTER — CLINICAL SUPPORT (OUTPATIENT)
Dept: INTERNAL MEDICINE | Facility: CLINIC | Age: 63
End: 2018-08-27
Payer: MEDICARE

## 2018-08-27 VITALS — BODY MASS INDEX: 39.14 KG/M2 | WEIGHT: 242.5 LBS

## 2018-08-27 NOTE — PROGRESS NOTES
Health  Follow-Up Note   [x] Office  [] Phone  Notes from previous session reviewed.   [x] Previous Session Goals unchanged.   [] Patient/Caregiver Change in Goals.  Goals added or changed by Patient/Caregiver since program participation:  1.  Continue same plan   2. Get back on track now that daughter went back to school        Additional/Changed support that patient/caregiver has experienced/sought?  (Indicate readiness, support from family, friends, others, community groups, etc)  1.  wife/ somewhat    Additional/Changed obstacles that could prevent patient/caregiver from reaching their goals?  1.  Daughter was here for 3 weeks from school likes to eat fast food.     Feedback provided:  1.  Praised for good job down 5 lbs total now 10.58 lb since 7/3/18    Diagnostic values/Desriptors for follow-up as needed for chronic condition(s)   Weight: 110 kg 242.51 lb     Interventions:   1. Health  listened reflectively, validated thoughts and feelings, offered support and encouragement.   2. Allowed patient to express themselves in a non-biased atmosphere.  3. Health  assisted pt to problem-solve obstacles such as being in a challenging environment and dealing with these challenges.   4. Motivational Interviewed interventions utilized (OARS).   5. Patient responded favorably to interventions and remained actively engaged in the session.   6. Health  will remain available and connected for patient by phone and/or office visits.   7. Positive reinforcement, emotional support and encouragement provided.   8. Focused Education: MI    Plan:  [x] Pt will work on goals as stated above.   [x] Pt will contact Health  for any questions, concerns or needs.  [x] Pt will follow up with Health  in office on   9/18/18 at 0800.     [] Pt will follow up with Health  on phone in:        [x] Health  will remain available.   [] Health  will contact patient by phone in:        [] Health   will consult:        [] Health  will inform Provider via EPIC messaging.     Impression:  1. Behavior is consistent with   Action    Stage of Change.   2. Participation level:       [x] Receptive      [x] Interactive      [] Guarded and Resistant      [x] Self Motivated      [] Refused/Declined to participate   3. [x] Pt voiced understanding of all information presented.       [] Pt voiced needing further information/education. This will be arranged.       [x] Pt would benefit from further education/information as identified by this health . This will be arranged.     Nya Bridges RN HC

## 2018-09-18 ENCOUNTER — CLINICAL SUPPORT (OUTPATIENT)
Dept: INTERNAL MEDICINE | Facility: CLINIC | Age: 63
End: 2018-09-18
Payer: MEDICARE

## 2018-09-18 ENCOUNTER — TELEPHONE (OUTPATIENT)
Dept: INTERNAL MEDICINE | Facility: CLINIC | Age: 63
End: 2018-09-18

## 2018-09-18 PROCEDURE — 99211 OFF/OP EST MAY X REQ PHY/QHP: CPT | Mod: PBBFAC

## 2018-09-18 PROCEDURE — 99999 PR PBB SHADOW E&M-EST. PATIENT-LVL I: CPT | Mod: PBBFAC,,,

## 2018-09-18 NOTE — PROGRESS NOTES
Health  Follow-Up Note   [x] Office  [] Phone  Notes from previous session reviewed.   [x] Previous Session Goals unchanged.   [] Patient/Caregiver Change in Goals.  Goals added or changed by Patient/Caregiver since program participation:  1.  Continue same plan  2. Take BP and keep log  3. Bring BP in at next visit to check with office BP    4. Continue low Na and low sugar diet     Additional/Changed support that patient/caregiver has experienced/sought?  (Indicate readiness, support from family, friends, others, community groups, etc)  1.  Family    Additional/Changed obstacles that could prevent patient/caregiver from reaching their goals?  1.  not exercising    Feedback provided:  1.  Praised for continued effort and determination    Diagnostic values/Desriptors for follow-up as needed for chronic condition(s)   Weight:110 kg 242.51 lb  /86 repeat 134/88 in office today    Interventions:   1. Health  listened reflectively, validated thoughts and feelings, offered support and encouragement.   2. Allowed patient to express themselves in a non-biased atmosphere.  3. Health  assisted pt to problem-solve obstacles such as being in a challenging environment and dealing with these challenges.   4. Motivational Interviewed interventions utilized (OARS).   5. Patient responded favorably to interventions and remained actively engaged in the session.   6. Health  will remain available and connected for patient by phone and/or office visits.   7. Positive reinforcement, emotional support and encouragement provided.   8. Focused Education: MI, request to Dr. Hills for early appt to review his BP meds c/o making nauseated and not controlling BP.        Patient states running high 180/199 systolic and 106/110 diastolic in am when he first gets up, states comes down during day but not to optimal level.         Patient states he has two cuffs a wrist and a arm cuff, request to bring in at next visit.  Offered the digital hypertension program and refuses at this time.      Plan:  [x] Pt will work on goals as stated above.   [x] Pt will contact Health  for any questions, concerns or needs.  [x] Pt will follow up with Health  in office on   10/2/18 at 0800.     [] Pt will follow up with Health  on phone in:        [x] Health  will remain available.   [] Health  will contact patient by phone in:        [] Health  will consult:        [] Health  will inform Provider via EPIC messaging.     Impression:  1. Behavior is consistent with    Action   Stage of Change.   2. Participation level:       [x] Receptive      [x] Interactive      [] Guarded and Resistant      [x] Self Motivated      [] Refused/Declined to participate   3. [x] Pt voiced understanding of all information presented.       [] Pt voiced needing further information/education. This will be arranged.       [x] Pt would benefit from further education/information as identified by this health . This will be arranged.     Nya Bridges RN HC

## 2018-09-18 NOTE — TELEPHONE ENCOUNTER
Pt having trouble with blood pressure control. Please schedule for follow up visit with me in next 2-3 weeks; okay to overbook for Sat clinic visit on Sept 22 or Oct 6th depending on availability as well. Once scheduled please move up his lab visit appointment from Oct 16th to 3-7 days prior to scheduled visit.    He also needs to bring his home blood pressure log to the visit, along with both of the blood pressure cuff that he is using at home.

## 2018-09-19 VITALS — DIASTOLIC BLOOD PRESSURE: 88 MMHG | SYSTOLIC BLOOD PRESSURE: 134 MMHG | BODY MASS INDEX: 39.14 KG/M2 | WEIGHT: 242.5 LBS

## 2018-09-26 ENCOUNTER — OFFICE VISIT (OUTPATIENT)
Dept: GASTROENTEROLOGY | Facility: CLINIC | Age: 63
End: 2018-09-26
Payer: MEDICARE

## 2018-09-26 VITALS
SYSTOLIC BLOOD PRESSURE: 158 MMHG | DIASTOLIC BLOOD PRESSURE: 89 MMHG | WEIGHT: 242.94 LBS | HEIGHT: 67 IN | HEART RATE: 48 BPM | BODY MASS INDEX: 38.13 KG/M2

## 2018-09-26 DIAGNOSIS — K90.89 BILE SALT-INDUCED DIARRHEA: Primary | ICD-10-CM

## 2018-09-26 DIAGNOSIS — Z12.11 SPECIAL SCREENING FOR MALIGNANT NEOPLASMS, COLON: ICD-10-CM

## 2018-09-26 DIAGNOSIS — Z12.11 COLON CANCER SCREENING: Primary | ICD-10-CM

## 2018-09-26 PROCEDURE — 99999 PR PBB SHADOW E&M-EST. PATIENT-LVL III: CPT | Mod: PBBFAC,,, | Performed by: INTERNAL MEDICINE

## 2018-09-26 PROCEDURE — 3079F DIAST BP 80-89 MM HG: CPT | Mod: CPTII,,, | Performed by: INTERNAL MEDICINE

## 2018-09-26 PROCEDURE — 3077F SYST BP >= 140 MM HG: CPT | Mod: CPTII,,, | Performed by: INTERNAL MEDICINE

## 2018-09-26 PROCEDURE — 3008F BODY MASS INDEX DOCD: CPT | Mod: CPTII,,, | Performed by: INTERNAL MEDICINE

## 2018-09-26 PROCEDURE — 99213 OFFICE O/P EST LOW 20 MIN: CPT | Mod: PBBFAC | Performed by: INTERNAL MEDICINE

## 2018-09-26 PROCEDURE — 99204 OFFICE O/P NEW MOD 45 MIN: CPT | Mod: S$PBB,,, | Performed by: INTERNAL MEDICINE

## 2018-09-26 RX ORDER — POLYETHYLENE GLYCOL 3350, SODIUM SULFATE ANHYDROUS, SODIUM BICARBONATE, SODIUM CHLORIDE, POTASSIUM CHLORIDE 236; 22.74; 6.74; 5.86; 2.97 G/4L; G/4L; G/4L; G/4L; G/4L
4 POWDER, FOR SOLUTION ORAL ONCE
Qty: 4000 ML | Refills: 0 | Status: SHIPPED | OUTPATIENT
Start: 2018-09-26 | End: 2018-09-26

## 2018-09-26 NOTE — LETTER
September 26, 2018      Tina Novak DNP  [address] [address]           [facility] - Gastroenterology  [address]  [facility] [address]  Phone: [phone]  Fax: [phone]          Patient: Isreal Randolph   MR Number: [id number]   YOB: 1955   Date of Visit: 9/26/2018       Dear Tina Novak:    Thank you for referring Isreal Randolph to me for evaluation. Attached you will find relevant portions of my assessment and plan of care.    If you have questions, please do not hesitate to call me. I look forward to following Isreal Randolph along with you.    Sincerely,    Mustapha Mccormack MD    Enclosure  CC:  No Recipients    If you would like to receive this communication electronically, please contact [email or web address] or [phone] to request more information on [employer] Link access.    For providers and/or their staff who would like to refer a patient to [facility], please contact us through our one-stop-shop provider referral line, Ariel Zelaya, at [phone].    If you feel you have received this communication in error or would no longer like to receive these types of communications, please e-mail [email or web address]

## 2018-10-05 ENCOUNTER — TELEPHONE (OUTPATIENT)
Dept: ADMINISTRATIVE | Facility: HOSPITAL | Age: 63
End: 2018-10-05

## 2018-10-05 NOTE — TELEPHONE ENCOUNTER
Good Afternoon    Yes he can come in for an appt but Dr Hills does not have any appointments available. He is not in today.  Please have him call the office he can sched with one of the other providers listed here.

## 2018-10-05 NOTE — TELEPHONE ENCOUNTER
"Message from [facility] Nurse:    Outreach was made to this patient to discuss adherence to his Losartan/HCTZ and encourage to fill script . Patient became very angry and reports he has been attempting to get into the office because the "medicine they have me on is making me sick". The patient was not specific about symptoms. Patient reports having elevated BP's at home , did not provide readings during this call. Reports he is drinking lemon water and eating garlic at home to try and control his pressures because he couldn't get an appt.      Are we able to call him and offer him an urgent care appt today for his blood pressure ?      Losartan/ HCTZ 79 % adherent for the calendar yr, past fue for fill , last filled 7/3/18, 90 ds    Amlodipine 10 mg , recent filled 90 ds 9/26/18, last notes from Dr Reinier ENCARNACION 7/23/18, hold for now        Thanks ,    Josiah Wade, RN    [facility] RN Navigator /    [facility]    Phone : [phone]  "

## 2018-10-23 ENCOUNTER — TELEPHONE (OUTPATIENT)
Dept: INTERNAL MEDICINE | Facility: CLINIC | Age: 63
End: 2018-10-23

## 2018-10-23 DIAGNOSIS — M54.42 CHRONIC MIDLINE LOW BACK PAIN WITH LEFT-SIDED SCIATICA: ICD-10-CM

## 2018-10-23 DIAGNOSIS — G89.29 CHRONIC MIDLINE LOW BACK PAIN WITH LEFT-SIDED SCIATICA: ICD-10-CM

## 2018-10-23 RX ORDER — IBUPROFEN 800 MG/1
TABLET ORAL
Qty: 60 TABLET | Refills: 3 | OUTPATIENT
Start: 2018-10-23

## 2018-10-23 NOTE — TELEPHONE ENCOUNTER
Medication refused.  Patient needs to have a recheck of his kidney function.  Please schedule patient for fasting labs next few days.  Labs were ordered on July 23rd.

## 2018-10-25 ENCOUNTER — HOSPITAL ENCOUNTER (EMERGENCY)
Facility: HOSPITAL | Age: 63
Discharge: HOME | End: 2018-10-25
Attending: EMERGENCY MEDICINE
Payer: MEDICARE

## 2018-10-25 VITALS
SYSTOLIC BLOOD PRESSURE: 192 MMHG | HEIGHT: 67 IN | HEART RATE: 55 BPM | WEIGHT: 235 LBS | TEMPERATURE: 98 F | DIASTOLIC BLOOD PRESSURE: 154 MMHG | RESPIRATION RATE: 16 BRPM | BODY MASS INDEX: 36.88 KG/M2 | OXYGEN SATURATION: 98 %

## 2018-10-25 DIAGNOSIS — S69.92XA INJURY OF NAIL BED OF FINGER OF LEFT HAND, INITIAL ENCOUNTER: ICD-10-CM

## 2018-10-25 DIAGNOSIS — T14.90XA TRAUMA: ICD-10-CM

## 2018-10-25 DIAGNOSIS — S62.631B OPEN DISPLACED FRACTURE OF DISTAL PHALANX OF LEFT INDEX FINGER, INITIAL ENCOUNTER: Primary | ICD-10-CM

## 2018-10-25 PROCEDURE — 11760 REPAIR OF NAIL BED: CPT

## 2018-10-25 PROCEDURE — 63600175 PHARM REV CODE 636 W HCPCS: Performed by: STUDENT IN AN ORGANIZED HEALTH CARE EDUCATION/TRAINING PROGRAM

## 2018-10-25 PROCEDURE — 96374 THER/PROPH/DIAG INJ IV PUSH: CPT

## 2018-10-25 PROCEDURE — 25000003 PHARM REV CODE 250: Performed by: STUDENT IN AN ORGANIZED HEALTH CARE EDUCATION/TRAINING PROGRAM

## 2018-10-25 PROCEDURE — 99284 EMERGENCY DEPT VISIT MOD MDM: CPT | Mod: ,,, | Performed by: EMERGENCY MEDICINE

## 2018-10-25 PROCEDURE — 99284 EMERGENCY DEPT VISIT MOD MDM: CPT | Mod: 25

## 2018-10-25 PROCEDURE — 96375 TX/PRO/DX INJ NEW DRUG ADDON: CPT

## 2018-10-25 PROCEDURE — 63600175 PHARM REV CODE 636 W HCPCS: Performed by: EMERGENCY MEDICINE

## 2018-10-25 RX ORDER — CLINDAMYCIN HYDROCHLORIDE 150 MG/1
450 CAPSULE ORAL 4 TIMES DAILY
Qty: 84 CAPSULE | Refills: 0 | Status: SHIPPED | OUTPATIENT
Start: 2018-10-25 | End: 2018-11-01

## 2018-10-25 RX ORDER — CEFAZOLIN SODIUM 1 G/3ML
2 INJECTION, POWDER, FOR SOLUTION INTRAMUSCULAR; INTRAVENOUS ONCE
Status: COMPLETED | OUTPATIENT
Start: 2018-10-25 | End: 2018-10-25

## 2018-10-25 RX ORDER — MORPHINE SULFATE 4 MG/ML
4 INJECTION, SOLUTION INTRAMUSCULAR; INTRAVENOUS
Status: COMPLETED | OUTPATIENT
Start: 2018-10-25 | End: 2018-10-25

## 2018-10-25 RX ORDER — HYDROMORPHONE HYDROCHLORIDE 1 MG/ML
1 INJECTION, SOLUTION INTRAMUSCULAR; INTRAVENOUS; SUBCUTANEOUS
Status: COMPLETED | OUTPATIENT
Start: 2018-10-25 | End: 2018-10-25

## 2018-10-25 RX ORDER — OXYCODONE AND ACETAMINOPHEN 5; 325 MG/1; MG/1
1 TABLET ORAL EVERY 6 HOURS PRN
Qty: 12 TABLET | Refills: 0 | Status: SHIPPED | OUTPATIENT
Start: 2018-10-25 | End: 2018-10-28

## 2018-10-25 RX ORDER — CEFTRIAXONE 1 G/1
1 INJECTION, POWDER, FOR SOLUTION INTRAMUSCULAR; INTRAVENOUS
Status: COMPLETED | OUTPATIENT
Start: 2018-10-25 | End: 2018-10-25

## 2018-10-25 RX ORDER — LIDOCAINE HYDROCHLORIDE 10 MG/ML
20 INJECTION INFILTRATION; PERINEURAL ONCE
Status: COMPLETED | OUTPATIENT
Start: 2018-10-25 | End: 2018-10-25

## 2018-10-25 RX ADMIN — CEFTRIAXONE SODIUM 1 G: 1 INJECTION, POWDER, FOR SOLUTION INTRAMUSCULAR; INTRAVENOUS at 05:10

## 2018-10-25 RX ADMIN — MORPHINE SULFATE 4 MG: 4 INJECTION, SOLUTION INTRAMUSCULAR; INTRAVENOUS at 05:10

## 2018-10-25 RX ADMIN — CEFAZOLIN 2 G: 1 INJECTION, POWDER, FOR SOLUTION INTRAMUSCULAR; INTRAVENOUS; PARENTERAL at 06:10

## 2018-10-25 RX ADMIN — HYDROMORPHONE HYDROCHLORIDE 1 MG: 1 INJECTION, SOLUTION INTRAMUSCULAR; INTRAVENOUS; SUBCUTANEOUS at 08:10

## 2018-10-25 RX ADMIN — LIDOCAINE HYDROCHLORIDE 20 ML: 10 INJECTION, SOLUTION INFILTRATION; PERINEURAL at 06:10

## 2018-10-25 NOTE — ED TRIAGE NOTES
Isreal Randolph, a 62 y.o. male presents to the ED via personal transportation with CC finger injury, reports injured on leaf blower.    Patient identifiers verified verbally with patient and correct for Isreal Randolph.    LOC/ APPEARANCE: The patient is awake, alert and oriented x 4. Pt is speaking appropriately, no slurred speech. Patient resting comfortably and in no acute distress. Pt is clean and well groomed. No JVD visible. Pt updated on POC. Bed low and locked with side rails up x2, call bell in pt reach.  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 Pt c/o numbness to right index finger.  MUSCULOSKELETAL: Full range of motion present in all extremities. Hand  equal and leg strength strong +5 bilaterally.  RESPIRATORY: Airway is open and patent. Respirations-unlabored, regular rate, equal bilaterally on inspiration and expiration. No accessory muscle use noted. Lungs clear to auscultation in all fields bilaterally anterior and posterior.   CARDIAC: Patient has regular heart rate.  No peripheral edema noted, and patient has no c/o chest pain. Peripheral pulses present equal and strong throughout.  ABDOMEN: Soft, rounded, and non-tender to palpation with no distention noted.   NEUROLOGIC: Eyes open spontaneously and facial expression symmetrical. Pt behavior appropriate to situation, and pt follows commands.  Pt reports sensation present in all extremities when touched with a finger. PERRLA

## 2018-10-25 NOTE — ED PROVIDER NOTES
Encounter Date: 10/25/2018       History     Chief Complaint   Patient presents with    Laceration     Pt presented to the ED via pov. Pt c/o lacteration to the left index finger. Bleeding controlled.      HPI     This is a 61yo man with hx HTN presenting with left index finger injury. States that just prior to arrival his left index finger got caught in a rotary part of a leaf blower and became mangled. He has sensation in the finger and is able to move it. Unknown last tetanus. Is right handed.     Review of patient's allergies indicates:   Allergen Reactions    Lactose intolerance  [lactase]      Other reaction(s): Diarrhea    Penicillins Other (See Comments)     Swelling of lymph nodes     Past Medical History:   Diagnosis Date    Allergy     Back pain     Hypertension     Mixed hyperlipidemia 7/3/2018    Obesity     Reflux     Sleep apnea 6/21/2013     Past Surgical History:   Procedure Laterality Date    CHOLECYSTECTOMY  2012    CHOLECYSTECTOMY, LAPAROSCOPIC N/A 7/19/2013    Performed by Virgilio Garcia MD at [facility] OR [address] [place] [address]    HERNIA REPAIR      REPAIR, HERNIA, UMBILICAL, AGE 5 YEARS OR OLDER N/A 7/19/2013    Performed by Virgilio Garcia MD at [facility] OR [address] [place] [address]     Family History   Problem Relation Age of Onset    Cancer Mother     Heart disease Father     Colon cancer Neg Hx     Esophageal cancer Neg Hx      Social History     Tobacco Use    Smoking status: Never Smoker    Smokeless tobacco: Never Used   Substance Use Topics    Alcohol use: Yes     Comment: 3 times per week    Drug use: No     Review of Systems   Constitutional: Negative for fever.   HENT: Negative for sore throat.    Respiratory: Negative for cough and shortness of breath.    Cardiovascular: Negative for chest pain.   Gastrointestinal: Negative for abdominal pain, nausea and vomiting.   Genitourinary: Negative for dysuria.   Skin: Positive for wound.   Neurological: Negative for weakness.    Psychiatric/Behavioral: Negative for confusion.   All other systems reviewed and are negative.      Physical Exam     Initial Vitals [10/25/18 1639]   BP Pulse Resp Temp SpO2   (!) 192/95 65 17 98.3 °F (36.8 °C) 96 %      MAP       --         Physical Exam    Nursing note and vitals reviewed.  Constitutional: He appears well-developed and well-nourished. No distress.   HENT:   Head: Normocephalic and atraumatic.   Mouth/Throat: Oropharynx is clear and moist.   Eyes: Conjunctivae and EOM are normal. Pupils are equal, round, and reactive to light.   Neck: Normal range of motion. Neck supple.   Cardiovascular: Normal rate, regular rhythm, normal heart sounds and intact distal pulses.   Pulmonary/Chest: Breath sounds normal. No respiratory distress. He has no wheezes. He has no rales.   Abdominal: Soft. He exhibits no distension. There is no tenderness.   Musculoskeletal:   2+ radial pulses  Left hand: deformity of the left index finger distal to the DIP joint with partial amputation and laceration through the nail bed and exposed bone, normal sensation over the finger, able to range the finger at all joints    Neurological: He is alert and oriented to person, place, and time.   Skin: Skin is warm and dry.   Psychiatric: He has a normal mood and affect.         ED Course   Procedures  Labs Reviewed - No data to display       Imaging Results          X-Ray Hand 2 View Left (Final result)  Result time 10/25/18 18:13:18    Final result by Brendon Eduardo MD (10/25/18 18:13:18)                 Impression:      1. Angulated fracture of the distal phalanx of the index finger with associated soft tissue swelling as described above.  2. Metallic density projected adjacent to the 1st metacarpal which is consistent with retained foreign body from uncertain chronicity.  Correlation advised.      Electronically signed by: Brendon Eduardo MD  Date:    10/25/2018  Time:    18:13             Narrative:    EXAMINATION:  XR HAND 2 VIEW  LEFT    CLINICAL HISTORY:  Injury, unspecified, initial encounter    TECHNIQUE:  Two views of the left hand were obtained.    COMPARISON:  None    FINDINGS:  There is an acute fracture with angulation involving the distal phalanx of the index finger.  There is metallic density projected adjacent to the 1st metacarpal which is of uncertain chronicity.  Correlation advised.  This could represent a radiopaque retained foreign body.  Remaining osseous structures are otherwise intact.  There is mild diffuse joint space narrowing.  Dressing within the distal index finger precludes optimal evaluation.  There is soft tissue swelling.                                MDM: MD SOLEDAD HiIII 6:16 PM  This is a 63yo man with hx HTN presenting with left index finger injury. Differential includes amputation, fracture, dislocation, nail bed injury. Patient was provided ceftriaxone on arrival for open fracture - since he allergic to penicillins we chose a higher generation cephalosporin to avoid cross reactivity. Tdap was ordered but records show he has received the vaccine within the past 5 years. Ortho has been consulted.     Update: SUYAPA Hi 7:43 PM  Ortho at bedside performing repair of finger wound.     Update: SUYAPA Hi 9:13 PM  Ortho has completed laceration repair and applied splint. Ortho ordered ancef for patient in addition to the ceftriaxone. Patient note he gets swollen lips when he has penicillins, has had no reaction during this ER stay to the cephalosporins. To avoid a reaction from outpatient keflex however, will discharge with clindamycin. He will follow up in ortho clinic in one week. Discussed return precautions prior to discharge.                             Clinical Impression:   The primary encounter diagnosis was Open displaced fracture of distal phalanx of left index finger, initial encounter. Diagnoses of Trauma and Injury of nail bed of finger of left hand, initial  encounter were also pertinent to this visit.                             Glory Stevens MD  Resident  10/25/18 7781

## 2018-10-26 NOTE — SUBJECTIVE & OBJECTIVE
Past Medical History:   Diagnosis Date    Allergy     Back pain     Hypertension     Mixed hyperlipidemia 7/3/2018    Obesity     Reflux     Sleep apnea 6/21/2013       Past Surgical History:   Procedure Laterality Date    CHOLECYSTECTOMY  2012    CHOLECYSTECTOMY, LAPAROSCOPIC N/A 7/19/2013    Performed by [facility]ilio Garcia MD at [facility] OR [address] [place] [address]    HERNIA REPAIR      REPAIR, HERNIA, UMBILICAL, AGE 5 YEARS OR OLDER N/A 7/19/2013    Performed by Virgilio Garcia MD at [facility] OR [address] [place] [address]       Review of patient's allergies indicates:   Allergen Reactions    Lactose intolerance  [lactase]      Other reaction(s): Diarrhea    Penicillins Other (See Comments)     Swelling of lymph nodes       Current Facility-Administered Medications   Medication    Tdap vaccine injection 0.5 mL     Current Outpatient Medications   Medication Sig    amLODIPine (NORVASC) 10 MG tablet Take 1 tablet (10 mg total) by mouth once daily.    cetirizine (ZYRTEC) 10 MG tablet Take 10 mg by mouth once daily.    diphenhydrAMINE (BENADRYL) 25 mg capsule Take by mouth. 1 Capsule Oral Every evening    losartan-hydrochlorothiazide 100-25 mg (HYZAAR) 100-25 mg per tablet TAKE ONE(1) TABLET BY MOUTH EVERY DAY    potassium chloride (MICRO-K) 10 MEQ CpSR Take 1 capsule (10 mEq total) by mouth once daily.    aluminum hydroxide-magnesium carbonate (GAVISCON) suspension Take 5 mLs by mouth every 6 (six) hours as needed.    atorvastatin (LIPITOR) 80 MG tablet Take 1 tablet (80 mg total) by mouth once daily.    cholestyramine, with sugar, 4 gram Powd MIX 1 PACKET IN AT LEAST 6 OUNCES OF LIQUID AND TAKE BY MOUTH TWICE DAILY WITH MEALS    clindamycin (CLEOCIN) 150 MG capsule Take 3 capsules (450 mg total) by mouth 4 (four) times daily. for 7 days    diphth,pertus,acell,,tetanus (BOOSTRIX) 2.5-8-5 Lf-mcg-Lf/0.5mL Syrg injection Inject into the muscle.     mg tablet TAKE 1 TABLET (800 MG TOTAL) BY MOUTH 3 (THREE) TIMES DAILY AS  "NEEDED (SCIATICA).    oxyCODONE-acetaminophen (PERCOCET) 5-325 mg per tablet Take 1 tablet by mouth every 6 (six) hours as needed for Pain.     Family History     Problem Relation (Age of Onset)    Cancer Mother    Heart disease Father        Tobacco Use    Smoking status: Never Smoker    Smokeless tobacco: Never Used   Substance and Sexual Activity    Alcohol use: Yes     Comment: 3 times per week    Drug use: No    Sexual activity: Yes     ROS   Per ED note    Objective:     Vital Signs (Most Recent):  Temp: 98 °F (36.7 °C) (10/25/18 2140)  Pulse: (!) 55 (10/25/18 2129)  Resp: 16 (10/25/18 2140)  BP: (!) 192/154(due for nighttime BP meds) (10/25/18 2123)  SpO2: 98 % (10/25/18 2140) Vital Signs (24h Range):  Temp:  [98 °F (36.7 °C)-98.3 °F (36.8 °C)] 98 °F (36.7 °C)  Pulse:  [53-65] 55  Resp:  [16-20] 16  SpO2:  [95 %-99 %] 98 %  BP: (143-240)/() 192/154     Weight: 106.6 kg (235 lb)  Height: 5' 6.5" (168.9 cm)  Body mass index is 37.36 kg/m².        Ortho/SPM Exam  Vitals: Afebrile.  Vital signs stable.  General: No acute distress.  HEENT: Normocephalic. Atraumatic. Sclera anicteric. No tracheal deviation.  Cardio: Regular rate.  Chest: No increased work of breathing.  Abdominal: Nondistended.  Extremities: No cyanosis.  No clubbing.  No edema.  Palpable pulses.  Skin: No generalized rash.  Neuro: Awake. Alert. Oriented to person, place, time, and situation.  Psych: Normal appearance. Cooperative.  Appropriate mood.  Appropriate affect.      MSK:  LUE:   Partial ulnar pulp avulsion involving skin over distal phalanx of index finger with radial soft tissue bridge; distal half of nailbed missing with visible bone; many complex lacerations involving skin distal to PIPJ  Tendons FDP/EDC intact  SILT distally on radial aspect; decreased sensation on ulnar aspect of index finger  Brisk cap refill  Warm well perfused extremities    Significant Labs: All pertinent labs within the past 24 hours have been " reviewed.    Significant Imaging: I have reviewed all pertinent imaging results/findings.     L hand XR showing oblique fx through P3.    Procedure note:  After time out was performed and patient ID, side, and site were verified, the left hand was sterilly prepped in the standard fashion.  A 22-gauge needle was introduced into the webspaces on either side of the finger without complication and 4 mL's of 1% lidocaine were then injected without difficulty.  After adequate analgesia was obtained, the wound was examined. Examination showed above. The laceration was then thoroughly irrigated with 3L of normal saline and betadine. At this point, the wound was primarily closed using 4 - 0 Ethilon on skin and 4-0 chromic gut on nailbed. Distal pulp advanced to remaining nailbed.  The nail was then sutured to the eponychium using 4-0 chromic gut, stenting open the eponychial fold. The patient tolerated the procedure well with no complications.  Blood loss was minimal.

## 2018-10-26 NOTE — CONSULTS
[facility]  Orthopedics  Consult Note    Patient Name: Isreal Randolph  MRN: [id number]  Admission Date: 10/25/2018  Hospital Length of Stay: 0 days  Attending Provider: No att. providers found  Primary Care Provider: Donald Hills MD    Patient information was obtained from patient, past medical records and ER records.     Consults  Subjective:       Chief Complaint:   Chief Complaint   Patient presents with    Laceration     Pt presented to the ED via pov. Pt c/o lacteration to the left index finger. Bleeding controlled.         HPI: No notes on file    Past Medical History:   Diagnosis Date    Allergy     Back pain     Hypertension     Mixed hyperlipidemia 7/3/2018    Obesity     Reflux     Sleep apnea 6/21/2013       Past Surgical History:   Procedure Laterality Date    CHOLECYSTECTOMY  2012    CHOLECYSTECTOMY, LAPAROSCOPIC N/A 7/19/2013    Performed by Virgilio Garcia MD at [facility] OR [address] [place] [address]    HERNIA REPAIR      REPAIR, HERNIA, UMBILICAL, AGE 5 YEARS OR OLDER N/A 7/19/2013    Performed by Virgilio Garcia MD at [facility] OR [address] [place] [address]       Review of patient's allergies indicates:   Allergen Reactions    Lactose intolerance  [lactase]      Other reaction(s): Diarrhea    Penicillins Other (See Comments)     Swelling of lymph nodes       Current Facility-Administered Medications   Medication    Tdap vaccine injection 0.5 mL     Current Outpatient Medications   Medication Sig    amLODIPine (NORVASC) 10 MG tablet Take 1 tablet (10 mg total) by mouth once daily.    cetirizine (ZYRTEC) 10 MG tablet Take 10 mg by mouth once daily.    diphenhydrAMINE (BENADRYL) 25 mg capsule Take by mouth. 1 Capsule Oral Every evening    losartan-hydrochlorothiazide 100-25 mg (HYZAAR) 100-25 mg per tablet TAKE ONE(1) TABLET BY MOUTH EVERY DAY    potassium chloride (MICRO-K) 10 MEQ CpSR Take 1 capsule (10 mEq total) by mouth once daily.    aluminum hydroxide-magnesium carbonate (GAVISCON) suspension  "Take 5 mLs by mouth every 6 (six) hours as needed.    atorvastatin (LIPITOR) 80 MG tablet Take 1 tablet (80 mg total) by mouth once daily.    cholestyramine, with sugar, 4 gram Powd MIX 1 PACKET IN AT LEAST 6 OUNCES OF LIQUID AND TAKE BY MOUTH TWICE DAILY WITH MEALS    clindamycin (CLEOCIN) 150 MG capsule Take 3 capsules (450 mg total) by mouth 4 (four) times daily. for 7 days    diphth,pertus,acell,,tetanus (BOOSTRIX) 2.5-8-5 Lf-mcg-Lf/0.5mL Syrg injection Inject into the muscle.     mg tablet TAKE 1 TABLET (800 MG TOTAL) BY MOUTH 3 (THREE) TIMES DAILY AS NEEDED (SCIATICA).    oxyCODONE-acetaminophen (PERCOCET) 5-325 mg per tablet Take 1 tablet by mouth every 6 (six) hours as needed for Pain.     Family History     Problem Relation (Age of Onset)    Cancer Mother    Heart disease Father        Tobacco Use    Smoking status: Never Smoker    Smokeless tobacco: Never Used   Substance and Sexual Activity    Alcohol use: Yes     Comment: 3 times per week    Drug use: No    Sexual activity: Yes     ROS   Per ED note    Objective:     Vital Signs (Most Recent):  Temp: 98 °F (36.7 °C) (10/25/18 2140)  Pulse: (!) 55 (10/25/18 2129)  Resp: 16 (10/25/18 2140)  BP: (!) 192/154(due for nighttime BP meds) (10/25/18 2123)  SpO2: 98 % (10/25/18 2140) Vital Signs (24h Range):  Temp:  [98 °F (36.7 °C)-98.3 °F (36.8 °C)] 98 °F (36.7 °C)  Pulse:  [53-65] 55  Resp:  [16-20] 16  SpO2:  [95 %-99 %] 98 %  BP: (143-240)/() 192/154     Weight: 106.6 kg (235 lb)  Height: 5' 6.5" (168.9 cm)  Body mass index is 37.36 kg/m².        Ortho/SPM Exam  Vitals: Afebrile.  Vital signs stable.  General: No acute distress.  HEENT: Normocephalic. Atraumatic. Sclera anicteric. No tracheal deviation.  Cardio: Regular rate.  Chest: No increased work of breathing.  Abdominal: Nondistended.  Extremities: No cyanosis.  No clubbing.  No edema.  Palpable pulses.  Skin: No generalized rash.  Neuro: Awake. Alert. Oriented to person, place, " time, and situation.  Psych: Normal appearance. Cooperative.  Appropriate mood.  Appropriate affect.      MSK:  LUE:   Partial ulnar pulp avulsion involving skin over distal phalanx of index finger with radial soft tissue bridge; distal half of nailbed missing with visible bone; many complex lacerations involving skin distal to PIPJ  Tendons FDP/EDC intact  SILT distally on radial aspect; decreased sensation on ulnar aspect of index finger  Brisk cap refill  Warm well perfused extremities    Significant Labs: All pertinent labs within the past 24 hours have been reviewed.    Significant Imaging: I have reviewed all pertinent imaging results/findings.     L hand XR showing oblique fx through P3.    Procedure note:  After time out was performed and patient ID, side, and site were verified, the left hand was sterilly prepped in the standard fashion.  A 22-gauge needle was introduced into the webspaces on either side of the finger without complication and 4 mL's of 1% lidocaine were then injected without difficulty.  After adequate analgesia was obtained, the wound was examined. Examination showed above. The laceration was then thoroughly irrigated with 3L of normal saline and betadine. At this point, the wound was primarily closed using 4 - 0 Ethilon on skin and 4-0 chromic gut on nailbed. Distal pulp advanced to remaining nailbed.  The nail was then sutured to the eponychium using 4-0 chromic gut, stenting open the eponychial fold. The patient tolerated the procedure well with no complications.  Blood loss was minimal.      Assessment/Plan:     Open displaced fracture of distal phalanx of left index finger    62M with open L P3 fx of index finger with significant soft tissue damage, some loss of nailbed.  Abx given in ED.  Tetanus UTD.  Irrigated and closed in ED.  Placed in splint.  NWB.  PO abx for DC.  F/u in hand clinic in one week for wound check.  Discussed with patient that he may require revision surgery to  address this injury.           Moy Clinton MD  Orthopedics  [facility]

## 2018-10-26 NOTE — ASSESSMENT & PLAN NOTE
62M with open L P3 fx of index finger with significant soft tissue damage, some loss of nailbed.  Abx given in ED.  Tetanus UTD.  Irrigated and closed in ED.  Placed in splint.  NWB.  PO abx for DC.  F/u in hand clinic in one week for wound check.  Discussed with patient that he may require revision surgery to address this injury.

## 2018-10-26 NOTE — DISCHARGE INSTRUCTIONS
Future Appointments   Date Time Provider Department Center   11/5/2018  1:20 PM Anmol Espino MD [facility]

## 2018-10-29 ENCOUNTER — OFFICE VISIT (OUTPATIENT)
Dept: ORTHOPEDICS | Facility: CLINIC | Age: 63
End: 2018-10-29
Payer: MEDICARE

## 2018-10-29 VITALS
BODY MASS INDEX: 36.88 KG/M2 | SYSTOLIC BLOOD PRESSURE: 145 MMHG | WEIGHT: 235 LBS | DIASTOLIC BLOOD PRESSURE: 81 MMHG | HEIGHT: 67 IN | HEART RATE: 55 BPM

## 2018-10-29 DIAGNOSIS — S67.10XA CRUSH INJURY TO FINGER, INITIAL ENCOUNTER: Primary | ICD-10-CM

## 2018-10-29 PROCEDURE — 99999 PR PBB SHADOW E&M-EST. PATIENT-LVL III: CPT | Mod: PBBFAC,,, | Performed by: ORTHOPAEDIC SURGERY

## 2018-10-29 PROCEDURE — 3079F DIAST BP 80-89 MM HG: CPT | Mod: CPTII,,, | Performed by: ORTHOPAEDIC SURGERY

## 2018-10-29 PROCEDURE — 3077F SYST BP >= 140 MM HG: CPT | Mod: CPTII,,, | Performed by: ORTHOPAEDIC SURGERY

## 2018-10-29 PROCEDURE — 3008F BODY MASS INDEX DOCD: CPT | Mod: CPTII,,, | Performed by: ORTHOPAEDIC SURGERY

## 2018-10-29 PROCEDURE — 99203 OFFICE O/P NEW LOW 30 MIN: CPT | Mod: S$PBB,,, | Performed by: ORTHOPAEDIC SURGERY

## 2018-10-29 PROCEDURE — 99213 OFFICE O/P EST LOW 20 MIN: CPT | Mod: PBBFAC | Performed by: ORTHOPAEDIC SURGERY

## 2018-10-29 RX ORDER — HYDROCODONE BITARTRATE AND ACETAMINOPHEN 7.5; 325 MG/1; MG/1
1 TABLET ORAL EVERY 6 HOURS PRN
Qty: 28 TABLET | Refills: 0 | Status: SHIPPED | OUTPATIENT
Start: 2018-10-29 | End: 2018-11-05

## 2018-10-29 RX ORDER — SULFAMETHOXAZOLE AND TRIMETHOPRIM 800; 160 MG/1; MG/1
1 TABLET ORAL 2 TIMES DAILY
Qty: 28 TABLET | Refills: 0 | Status: SHIPPED | OUTPATIENT
Start: 2018-10-29 | End: 2019-01-09

## 2018-10-29 NOTE — PROGRESS NOTES
[facility]  History & Physical  Orthopedics    SUBJECTIVE:      Chief Complaint: Left index finger injury    Referring Provider: No ref. provider found     History of Present Illness:  Patient is a 62 y.o. male who presents today with complaints of left index finger injury.  It got caught in a blower fan blade 10/25/18.  He went to the ED and underwent I&D with primary closure and nailbed repair.  He has been on abx and tetanus is UTD.  He presents for evaluation.    Onset of symptoms/DOI was 10/25/18.    Symptoms are aggravated by activity and movement.    Symptoms are alleviated by rest.    Symptoms consist of pain, swelling, ecchymosis, laceration and decreased ROM.    The patient rates their pain as a moderate    Attempted treatment(s) and/or interventions include rest, narcotic medications, immobilization, elevation, I&D, antibiotic therapy and splinting/casting.     The patient denies any fevers, chills, N/V, D/C and presents for evaluation.       Past Medical History:   Diagnosis Date    Allergy     Back pain     Hypertension     Mixed hyperlipidemia 7/3/2018    Obesity     Reflux     Sleep apnea 6/21/2013     Past Surgical History:   Procedure Laterality Date    CHOLECYSTECTOMY  2012    CHOLECYSTECTOMY, LAPAROSCOPIC N/A 7/19/2013    Performed by Virgilio Garcia [facility] at [facility] OR 2ND [facility]    HERNIA REPAIR      REPAIR, HERNIA, UMBILICAL, AGE 5 YEARS OR OLDER N/A 7/19/2013    Performed by Virgilio Garcia MD at [facility] OR 2ND [facility]LR     Review of patient's allergies indicates:   Allergen Reactions    Lactose intolerance  [lactase]      Other reaction(s): Diarrhea    Penicillins Other (See Comments)     Swelling of lymph nodes     Social History     Social History Narrative    Not on file     Family History   Problem Relation Age of Onset    Cancer Mother     Heart disease Father     Colon cancer Neg Hx     Esophageal cancer Neg Hx          Current Outpatient Medications:      aluminum hydroxide-magnesium carbonate (GAVISCON) suspension, Take 5 mLs by mouth every 6 (six) hours as needed., Disp: , Rfl:     amLODIPine (NORVASC) 10 MG tablet, Take 1 tablet (10 mg total) by mouth once daily., Disp: 90 tablet, Rfl: 3    atorvastatin (LIPITOR) 80 MG tablet, Take 1 tablet (80 mg total) by mouth once daily., Disp: 90 tablet, Rfl: 3    cetirizine (ZYRTEC) 10 MG tablet, Take 10 mg by mouth once daily., Disp: , Rfl:     cholestyramine, with sugar, 4 gram Powd, MIX 1 PACKET IN AT LEAST 6 OUNCES OF LIQUID AND TAKE BY MOUTH TWICE DAILY WITH MEALS, Disp: 60 g, Rfl: 5    clindamycin (CLEOCIN) 150 MG capsule, Take 3 capsules (450 mg total) by mouth 4 (four) times daily. for 7 days, Disp: 84 capsule, Rfl: 0    diphenhydrAMINE (BENADRYL) 25 mg capsule, Take by mouth. 1 Capsule Oral Every evening, Disp: , Rfl:     diphth,pertus,acell,,tetanus (BOOSTRIX) 2.5-8-5 Lf-mcg-Lf/0.5mL Syrg injection, Inject into the muscle., Disp: 0.5 mL, Rfl: 0     mg tablet, TAKE 1 TABLET (800 MG TOTAL) BY MOUTH 3 (THREE) TIMES DAILY AS NEEDED (SCIATICA)., Disp: , Rfl: 3    losartan-hydrochlorothiazide 100-25 mg (HYZAAR) 100-25 mg per tablet, TAKE ONE(1) TABLET BY MOUTH EVERY DAY, Disp: 90 tablet, Rfl: 3    potassium chloride (MICRO-K) 10 MEQ CpSR, Take 1 capsule (10 mEq total) by mouth once daily., Disp: 30 capsule, Rfl: 2    HYDROcodone-acetaminophen (NORCO) 7.5-325 mg per tablet, Take 1 tablet by mouth every 6 (six) hours as needed for Pain., Disp: 28 tablet, Rfl: 0    sulfamethoxazole-trimethoprim 800-160mg (BACTRIM DS) 800-160 mg Tab, Take 1 tablet by mouth 2 (two) times daily., Disp: 28 tablet, Rfl: 0      Review of Systems:  Constitutional: no fever or chills  Eyes: no visual changes  ENT: no nasal congestion or sore throat  Respiratory: no cough or shortness of breath  Cardiovascular: no chest pain  Gastrointestinal: no nausea or vomiting, tolerating diet  Musculoskeletal: pain, soreness, decreased ROM,  "wound and laceration    OBJECTIVE:      Vital Signs (Most Recent):  Vitals:    10/29/18 1439   BP: (!) 145/81   BP Location: Right arm   Patient Position: Sitting   BP Method: X-Large (Automatic)   Pulse: (!) 55   Weight: 106.6 kg (235 lb 0.2 oz)   Height: 5' 6.5" (1.689 m)     Body mass index is 37.36 kg/m².      Physical Exam:  Constitutional: The patient appears well-developed and well-nourished. No distress.   Head: Normocephalic and atraumatic.   Nose: Nose normal.   Eyes: Conjunctivae and EOM are normal.   Neck: No tracheal deviation present.   Cardiovascular: Normal rate and intact distal pulses.    Pulmonary/Chest: Effort normal. No respiratory distress.   Abdominal: There is no guarding.   Neurological: The patient is alert.   Psychiatric: The patient has a normal mood and affect.     Left Hand/Wrist Examination:    Observation/Inspection:  Swelling  +    Deformity  none  Discoloration  +    Scars   none    Atrophy  None  Left IF with significant soft tissue injury s/p complex primary repair, fully closed now.  The ulnar pulp has some questionable skin viability 1x1cm.  No evidence of infection.  Able to actively flex DIP somewhat though limited.  Nail replaced with xeroform splint.  NVI to radial/digital nerves.  Brisk CR to fingertip.    HAND/WRIST EXAMINATION:  Finkelstein's Test   Neg  Snuff box tenderness   Neg  Graham's Test    Neg  Hook of Hamate Tenderness  Neg  CMC grind    Neg  Circumduction test   Neg    Neurovascular Exam:  Digits WWP, brisk CR < 3s throughout  NVI motor/LTS to M/R/U nerves, radial pulse 2+  Tinel's Test - Carpal Tunnel  Neg  Tinel's Test - Cubital Tunnel  Neg  Phalen's Test    Neg  Median Nerve Compression Test Neg    ROM hand/wrist/elbow full, painless      Diagnostic Results:     Xray - fracture of distal phalanx left index finger  EMG - none    ASSESSMENT/PLAN:      62 y.o. yo male with significant trauma to left IF with open P3 fracture and significant soft tissue injury " s/p I&D with primary repair in the ED  1) At this time, fracture fixation is precluded by soft tissue injury.  Patient understands the risk of fracture nonunion/malunion or even need for future amputation depending on bony and soft tissue healing.  FDP intact by exam today.  2) We will watch this wound closely to determine need for I&D or delayed intervention.  For now, no sign of infection and closure is adequate so we will give this time.    3) Bactrim to pharmacy  4) Norco today  5) Shower daily with gentle soap/water cleanse.  No soaking in tub.    6) Wound redressed with Kevin splint  7) Daily dressing betsy Leija M.D.

## 2018-11-01 ENCOUNTER — PATIENT MESSAGE (OUTPATIENT)
Dept: INTERNAL MEDICINE | Facility: CLINIC | Age: 63
End: 2018-11-01

## 2018-11-01 RX ORDER — IBUPROFEN 800 MG/1
TABLET ORAL
Qty: 60 TABLET | Refills: 3 | Status: CANCELLED | OUTPATIENT
Start: 2018-11-01

## 2018-11-01 NOTE — TELEPHONE ENCOUNTER
Last note from Dr Hills on 10/23  Medication refused.  Patient needs to have a recheck of his kidney function.  Please schedule patient for fasting labs next few days.  Labs were ordered on July 23rd      I have tried multiple times to get in touch with pt also sending a My O mssg to

## 2018-11-02 ENCOUNTER — ANESTHESIA EVENT (OUTPATIENT)
Dept: ENDOSCOPY | Facility: HOSPITAL | Age: 63
End: 2018-11-02

## 2018-11-02 ENCOUNTER — ANESTHESIA (OUTPATIENT)
Dept: ENDOSCOPY | Facility: HOSPITAL | Age: 63
End: 2018-11-02

## 2018-11-05 ENCOUNTER — OFFICE VISIT (OUTPATIENT)
Dept: FAMILY MEDICINE | Facility: HOSPITAL | Age: 63
End: 2018-11-05
Attending: FAMILY MEDICINE
Payer: MEDICARE

## 2018-11-05 VITALS
SYSTOLIC BLOOD PRESSURE: 143 MMHG | WEIGHT: 235 LBS | HEART RATE: 55 BPM | HEIGHT: 66 IN | BODY MASS INDEX: 37.77 KG/M2 | DIASTOLIC BLOOD PRESSURE: 81 MMHG

## 2018-11-05 DIAGNOSIS — Z12.11 COLON CANCER SCREENING: Primary | ICD-10-CM

## 2018-11-05 DIAGNOSIS — Z12.11 ENCOUNTER FOR SCREENING COLONOSCOPY: ICD-10-CM

## 2018-11-05 DIAGNOSIS — R19.7 POSTCHOLECYSTECTOMY DIARRHEA: ICD-10-CM

## 2018-11-05 DIAGNOSIS — K91.89 POSTCHOLECYSTECTOMY DIARRHEA: ICD-10-CM

## 2018-11-05 DIAGNOSIS — K40.90 NON-RECURRENT UNILATERAL INGUINAL HERNIA WITHOUT OBSTRUCTION OR GANGRENE: ICD-10-CM

## 2018-11-05 DIAGNOSIS — Z91.89 HIGH RISK OF CARDIAC EVENT: Primary | ICD-10-CM

## 2018-11-05 DIAGNOSIS — Z23 NEED FOR PNEUMOCOCCAL VACCINE: ICD-10-CM

## 2018-11-05 DIAGNOSIS — Z12.9 CANCER SCREENING: ICD-10-CM

## 2018-11-05 DIAGNOSIS — Z23 NEEDS FLU SHOT: ICD-10-CM

## 2018-11-05 PROCEDURE — 99215 OFFICE O/P EST HI 40 MIN: CPT | Mod: 25 | Performed by: STUDENT IN AN ORGANIZED HEALTH CARE EDUCATION/TRAINING PROGRAM

## 2018-11-05 PROCEDURE — 90732 PPSV23 VACC 2 YRS+ SUBQ/IM: CPT

## 2018-11-05 PROCEDURE — 90686 IIV4 VACC NO PRSV 0.5 ML IM: CPT

## 2018-11-05 RX ORDER — NAPROXEN SODIUM 220 MG/1
81 TABLET, FILM COATED ORAL DAILY
Refills: 0 | COMMUNITY
Start: 2018-11-05 | End: 2019-11-18 | Stop reason: ALTCHOICE

## 2018-11-05 NOTE — PROGRESS NOTES
History & Physical  Family Medicine    Subjective:    Chief Complaint: groin pain     History of Present Illness:  62 y.o. male who  has a past medical history of  Hypertension, Mixed hyperlipidemia (7/3/2018), Obesity, Reflux, umbilical hernia s/p repair, back pain, and Sleep apnea (6/21/2013) resolved after weight loss presents for chronic groin pain for a month w/o changes in appetite or bowel habits. 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 He is obese but did lose 60 lbs over a few months that was intentional. 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 He had a left inguinal hernia years ago that was corrected with surgery and felt similar to his RLQ pain. His pain is not severe. Patient states that NSAIDs have not improved the pain. Patient denies hematochezia, appetite change, vomiting. Patient also wants flu shot today. He is not up to date on his colonoscopy within the last 10 years. He denies blood in stool and a family history of colon cancer. He is not on ASA. He is taking his statin. No CP, SOB, n/v, fever or chills.     The patient had no other complaints.     Previous medical records reviewed and summarized above in HPI.    Past Medical History:   Diagnosis Date    Allergy     Back pain     Hypertension     Mixed hyperlipidemia 7/3/2018    Obesity     Reflux     Sleep apnea 6/21/2013       Past Surgical History:   Procedure Laterality Date    CHOLECYSTECTOMY  2012    CHOLECYSTECTOMY, LAPAROSCOPIC N/A 7/19/2013    Performed [facility] at [facility] 2ND [facility]    COLONOSCOPY N/A 11/2/2018    Performed by Mustapha Mccormack MD at [facility] ENDO (4TH [facility]LR)    HERNIA REPAIR      REPAIR, HERNIA, UMBILICAL, AGE 5 YEARS OR OLDER N/A 7/19/2013    Performed by [facility] at [facility] OR 2ND [facility]       Family History    Problem Relation Age of Onset    Cancer Mother     Heart disease Father     Colon cancer Neg Hx     Esophageal cancer Neg Hx        Social History     Socioeconomic History    Marital status:      Spouse name: None    Number of children: None    Years of education: None    Highest education level: None   Social Needs    Financial resource strain: None    Food insecurity - worry: None    Food insecurity - inability: None    Transportation needs - medical: None    Transportation needs - non-medical: None   Occupational History     Employer: [employer]   Tobacco Use    Smoking status: Never Smoker    Smokeless tobacco: Never Used   Substance and Sexual Activity    Alcohol use: Yes     Comment: 3 times per week    Drug use: No    Sexual activity: Yes   Other Topics Concern    None   Social History Narrative    None       Current Outpatient Medications   Medication Sig Dispense Refill    aluminum hydroxide-magnesium carbonate (GAVISCON) suspension Take 5 mLs by mouth every 6 (six) hours as needed.      amLODIPine (NORVASC) 10 MG tablet Take 1 tablet (10 mg total) by mouth once daily. 90 tablet 3    atorvastatin (LIPITOR) 80 MG tablet Take 1 tablet (80 mg total) by mouth once daily. 90 tablet 3    cetirizine (ZYRTEC) 10 MG tablet Take 10 mg by mouth once daily.      diphenhydrAMINE (BENADRYL) 25 mg capsule Take by mouth. 1 Capsule Oral Every evening      diphth,pertus,acell,,tetanus (BOOSTRIX) 2.5-8-5 Lf-mcg-Lf/0.5mL Syrg injection Inject into the muscle. 0.5 mL 0    HYDROcodone-acetaminophen (NORCO) 7.5-325 mg per tablet Take 1 tablet by mouth every 6 (six) hours as needed for Pain. 28 tablet 0     mg tablet TAKE 1 TABLET (800 MG TOTAL) BY MOUTH 3 (THREE) TIMES DAILY AS NEEDED (SCIATICA).  3    losartan-hydrochlorothiazide 100-25 mg (HYZAAR) 100-25 mg per tablet TAKE ONE(1) TABLET BY MOUTH EVERY DAY 90 tablet 3    potassium chloride (MICRO-K) 10 MEQ CpSR Take 1 capsule (10 mEq  "total) by mouth once daily. 30 capsule 2    sulfamethoxazole-trimethoprim 800-160mg (BACTRIM DS) 800-160 mg Tab Take 1 tablet by mouth 2 (two) times daily. 28 tablet 0    aspirin 81 MG Chew Take 1 tablet (81 mg total) by mouth once daily.  0    cholestyramine, with sugar, 4 gram Powd MIX 1 PACKET IN AT LEAST 6 OUNCES OF LIQUID AND TAKE BY MOUTH TWICE DAILY WITH MEALS 60 g 5     No current facility-administered medications for this visit.        Review of patient's allergies indicates:   Allergen Reactions    Lactose intolerance  [lactase]      Other reaction(s): Diarrhea    Penicillins Other (See Comments)     Swelling of lymph nodes       Review of Systems  General ROS: negative for chills, fever or weight loss  Psychological ROS: negative for hallucination, depression or suicidal ideation  Ophthalmic ROS: negative for blurry vision, photophobia or eye pain  ENT ROS: negative for epistaxis, sore throat or rhinorrhea  Respiratory ROS: no cough, shortness of breath, or wheezing  Cardiovascular ROS: no chest pain or dyspnea on exertion  Gastrointestinal ROS:RLQ pain. , change in bowel habits, or black/ bloody stools  Genito-Urinary ROS: no dysuria, trouble voiding, or hematuria  Musculoskeletal ROS: negative for gait disturbance or muscular weakness  Neurological ROS: no syncope or seizures; no ataxia  Dermatological ROS: negative for pruritis, rash and jaundice    Physical Examination  BP (!) 143/81   Pulse (!) 55   Ht 5' 6" (1.676 m)   Wt 106.6 kg (235 lb 0.2 oz)   BMI 37.93 kg/m²   General appearance: alert, cooperative, no distress  HENT: Normocephalic, atraumatic, neck symmetrical, no nasal discharge   Eyes: conjunctivae/corneas clear, PERRL, EOM's intact  Lungs: clear to auscultation bilaterally, no dullness to percussion bilaterally  Heart: regular rate and rhythm without rub; no displacement of the PMI   Abdomen: soft, tenderness in RLQ. reducible not incarcerated inguinal hernia. ; bowel sounds " normoactive; no organomegaly  Extremities: extremities symmetric; no clubbing, cyanosis, or edema  Integument: Skin color, texture, turgor normal; no rashes;   Neurologic: Alert and oriented X 3, normal strength, normal coordination and gait  Psychiatric: no pressured speech; normal affect; no evidence of impaired cognition     Data reviewed    Previous medical records reviewed and summarized above in HPI.       Laboratory: I reviewed the most recent lab tests and pertinent recent labs are remarkable for HLD and elevated A1C. He also had a creatinine of 1.6   Lab Results   Component Value Date    HGBA1C 6.1 (H) 07/03/2018       Imaging  Assessment     Patient Active Problem List    Diagnosis Date Noted    Crush injury to finger, initial encounter 10/29/2018    Open displaced fracture of distal phalanx of left index finger 10/25/2018    Chronic midline low back pain with left-sided sciatica 07/23/2018    Sinus bradycardia 07/23/2018    Mixed hyperlipidemia 07/03/2018    Severe obesity (BMI 35.0-39.9) with comorbidity 07/03/2018    Impaired fasting glucose 07/03/2018    Postcholecystectomy diarrhea 07/30/2014    Chronic rhinitis 07/30/2014    Inguinal hernia 07/30/2014    Essential hypertension 06/21/2013    Sleep apnea -- resolved with weight loss 06/21/2013       Plan    Isreal was seen today for establish care.    Diagnoses and all orders for this visit:    High risk of cardiac event  -     aspirin 81 MG Chew; Take 1 tablet (81 mg total) by mouth once daily.    Non-recurrent unilateral inguinal hernia without obstruction or gangrene  -     Ambulatory referral to General Surgery    Cancer screening  -     US Abdomen Complete; Future    Encounter for screening colonoscopy  -     Ambulatory referral to Gastroenterology    Needs flu shot  -     Influenza - Quadrivalent (3 years & older) (PF)    Need for pneumococcal vaccine  -     (In Office Administered) Pneumococcal Polysaccharide Vaccine (23 Valent)  (SQ/IM)        ASCVD risk 20.7% so adding ASA today. He is already on high dose statin    RTC in 2 weeks for f/u of hernia US. Also need TDAP next visit.       Anmol Espino MD    11/05/2018

## 2018-11-06 ENCOUNTER — OFFICE VISIT (OUTPATIENT)
Dept: SURGERY | Facility: CLINIC | Age: 63
End: 2018-11-06
Payer: MEDICARE

## 2018-11-06 VITALS
SYSTOLIC BLOOD PRESSURE: 133 MMHG | TEMPERATURE: 98 F | BODY MASS INDEX: 37.31 KG/M2 | HEIGHT: 66 IN | HEART RATE: 56 BPM | WEIGHT: 232.13 LBS | DIASTOLIC BLOOD PRESSURE: 81 MMHG

## 2018-11-06 DIAGNOSIS — K40.90 RIGHT GROIN HERNIA: ICD-10-CM

## 2018-11-06 DIAGNOSIS — E66.9 OBESITY (BMI 35.0-39.9 WITHOUT COMORBIDITY): ICD-10-CM

## 2018-11-06 DIAGNOSIS — Z87.19 H/O LEFT INGUINAL HERNIA REPAIR: ICD-10-CM

## 2018-11-06 DIAGNOSIS — Z98.890 H/O LEFT INGUINAL HERNIA REPAIR: ICD-10-CM

## 2018-11-06 DIAGNOSIS — I10 ESSENTIAL HYPERTENSION: ICD-10-CM

## 2018-11-06 DIAGNOSIS — R10.31 RIGHT GROIN PAIN: Primary | ICD-10-CM

## 2018-11-06 DIAGNOSIS — E65 TRUNCAL OBESITY: ICD-10-CM

## 2018-11-06 DIAGNOSIS — Z12.11 SCREEN FOR COLON CANCER: ICD-10-CM

## 2018-11-06 PROCEDURE — 99999 PR PBB SHADOW E&M-EST. PATIENT-LVL III: CPT | Mod: PBBFAC,,, | Performed by: SURGERY

## 2018-11-06 PROCEDURE — 3008F BODY MASS INDEX DOCD: CPT | Mod: CPTII,S$GLB,, | Performed by: SURGERY

## 2018-11-06 PROCEDURE — 3079F DIAST BP 80-89 MM HG: CPT | Mod: CPTII,S$GLB,, | Performed by: SURGERY

## 2018-11-06 PROCEDURE — 99205 OFFICE O/P NEW HI 60 MIN: CPT | Mod: S$GLB,,, | Performed by: SURGERY

## 2018-11-06 PROCEDURE — 3075F SYST BP GE 130 - 139MM HG: CPT | Mod: CPTII,S$GLB,, | Performed by: SURGERY

## 2018-11-06 NOTE — LETTER
November 6, 2018      Anmol Espino MD  [address]  [address]  [place] [address]           [facility]  [address]  [address]  [place] [address]  Phone: [phone]          Patient: Isreal Randolph   MR Number: [id number]   YOB: 1955   Date of Visit: 11/6/2018       Dear Dr. Anmol Espino:    Thank you for referring Isreal Randolph to me for evaluation. Attached you will find relevant portions of my assessment and plan of care.    If you have questions, please do not hesitate to call me. I look forward to following Isreal Randolph along with you.    Sincerely,    Glory Wakefield, DO    Enclosure  CC:  No Recipients    If you would like to receive this communication electronically, please contact [email or web address] or [phone] to request more information on [facility] access.    For providers and/or their staff who would like to refer a patient to [facility], please contact us through our one-stop-shop provider referral line, Ariel Zelaya, at [phone].    If you feel you have received this communication in error or would no longer like to receive these types of communications, please e-mail [email or web address]

## 2018-11-06 NOTE — PROGRESS NOTES
Subjective:      Patient ID: Isreal Randolph is a 62 y.o. male.    Chief Complaint: Hernia  Pt presents for eval of R groin pain and possible RIH.  He has h/o LIHR many years ago at 18yo. No w has a RIH which he noted 3wk ago. Possible increase in size of past few weeks. 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 No n/v. No f/c. No constipation/diarrhea. No urinary sx.  He has made diet changes and has lost 60lb since 1/2018.  No other c/o.    Past Medical History:   Diagnosis Date    Allergy     Back pain     Hypertension     Mixed hyperlipidemia 7/3/2018    Obesity     Reflux     Sleep apnea 6/21/2013     Past Surgical History:   Procedure Laterality Date    CHOLECYSTECTOMY  2012    CHOLECYSTECTOMY, LAPAROSCOPIC N/A 7/19/2013    [facility] at [facility] OR 2ND [facility]    COLONOSCOPY N/A 11/2/2018    Performed by Mustapha Mccormack MD at [facility] ENDO (4TH FLR)    HERNIA REPAIR      REPAIR, HERNIA, UMBILICAL, AGE 5 YEARS OR OLDER N/A 7/19/2013    Performed by [facility] at [facility] OR 2ND [facility]     Family History   Problem Relation Age of Onset    Cancer Mother     Heart disease Father     Colon cancer Neg Hx     Esophageal cancer Neg Hx      Social History     Socioeconomic History    Marital status:      Spouse name: None    Number of children: None    Years of education: None    Highest education level: None   Social Needs    Financial resource strain: None    Food insecurity - worry: None    Food insecurity - inability: None    Transportation needs - medical: None    Transportation needs - non-medical: None   Occupational History     Employer: [employer] [id number]   Tobacco Use    Smoking status: Never Smoker    Smokeless tobacco: Never Used   Substance and Sexual Activity    Alcohol use: Yes     Comment: 3 times per week    Drug use: No    Sexual activity: Yes   Other Topics Concern    None   Social History Narrative    None       Current Outpatient  "Medications   Medication Sig Dispense Refill    aluminum hydroxide-magnesium carbonate (GAVISCON) suspension Take 5 mLs by mouth every 6 (six) hours as needed.      amLODIPine (NORVASC) 10 MG tablet Take 1 tablet (10 mg total) by mouth once daily. 90 tablet 3    aspirin 81 MG Chew Take 1 tablet (81 mg total) by mouth once daily.  0    atorvastatin (LIPITOR) 80 MG tablet Take 1 tablet (80 mg total) by mouth once daily. 90 tablet 3    cetirizine (ZYRTEC) 10 MG tablet Take 10 mg by mouth once daily.      cholestyramine, with sugar, 4 gram Powd MIX 1 PACKET IN AT LEAST 6 OUNCES OF LIQUID AND TAKE BY MOUTH TWICE DAILY WITH MEALS 60 g 5    diphenhydrAMINE (BENADRYL) 25 mg capsule Take by mouth. 1 Capsule Oral Every evening      diphth,pertus,acell,,tetanus (BOOSTRIX) 2.5-8-5 Lf-mcg-Lf/0.5mL Syrg injection Inject into the muscle. 0.5 mL 0     mg tablet TAKE 1 TABLET (800 MG TOTAL) BY MOUTH 3 (THREE) TIMES DAILY AS NEEDED (SCIATICA).  3    losartan-hydrochlorothiazide 100-25 mg (HYZAAR) 100-25 mg per tablet TAKE ONE(1) TABLET BY MOUTH EVERY DAY 90 tablet 3    potassium chloride (MICRO-K) 10 MEQ CpSR Take 1 capsule (10 mEq total) by mouth once daily. 30 capsule 2    sulfamethoxazole-trimethoprim 800-160mg (BACTRIM DS) 800-160 mg Tab Take 1 tablet by mouth 2 (two) times daily. 28 tablet 0     No current facility-administered medications for this visit.      Review of patient's allergies indicates:   Allergen Reactions    Lactose intolerance  [lactase]      Other reaction(s): Diarrhea    Penicillins Other (See Comments)     Swelling of lymph nodes       ROS:  All systems were reviewed and are negative, except that mentioned in the HPI.    Objective:     Vitals:    11/06/18 1341   BP: 133/81   Pulse: (!) 56   Temp: 98.4 °F (36.9 °C)   Weight: 105.3 kg (232 lb 2.3 oz)   Height: 5' 6" (1.676 m)     Physical Exam   Constitutional: He is oriented to person, place, and time. He appears well-developed and " well-nourished. No distress.   HENT:   Head: Normocephalic and atraumatic.   Eyes: Conjunctivae and EOM are normal. Pupils are equal, round, and reactive to light. No scleral icterus.   Neck: Normal range of motion. Neck supple. No JVD present.   Cardiovascular: Normal rate and regular rhythm.   Pulmonary/Chest: Effort normal and breath sounds normal. No respiratory distress.   Abdominal: Soft. Bowel sounds are normal. He exhibits no distension. There is tenderness (right groinright groin). There is no rebound and no guarding.   Possible hernia right groin, mild ttp, difficult to assess due to body habitus, no skin changes   Musculoskeletal: Normal range of motion. He exhibits no edema or tenderness.   Neurological: He is alert and oriented to person, place, and time. No cranial nerve deficit.   Skin: Skin is warm and dry. He is not diaphoretic.   Psychiatric: He has a normal mood and affect.       Lab Review: CBC:   Lab Results   Component Value Date    WBC 4.26 07/03/2018    RBC 3.89 (L) 07/03/2018    HGB 12.2 (L) 07/03/2018    HCT 37.3 (L) 07/03/2018     07/03/2018     BMP:   Lab Results   Component Value Date     07/23/2018     07/23/2018    K 3.3 (L) 07/23/2018     07/23/2018    CO2 26 07/23/2018    BUN 25 (H) 07/23/2018    CREATININE 1.6 (H) 07/23/2018    CALCIUM 9.6 07/23/2018     Lab Results   Component Value Date    ALT 25 07/03/2018    AST 35 07/03/2018    ALKPHOS 50 (L) 07/03/2018    BILITOT 0.4 07/03/2018       Diagnostics Review:  U/S pending    Assessment:     1. Right groin pain    2. Right groin hernia    3. H/O left inguinal hernia repair    4. Obesity (BMI 35.0-39.9 without comorbidity)    5. Truncal obesity    6. Essential hypertension    7. Screen for colon cancer      Plan:     The pathology of the patient's disease was discussed: possible RIH. Written handouts were explained and given to the patient.  Patient concerned about other potential etiologies of his groin  discomfort and preferred to proceed with diagnostic imaging.  U/S abd ordered by PCP for cancer screening. Will add hernia eval as well.  If this is non-diagnostic, will consider CT.  Colonoscopy also ordered by PCP.  Signs and symptoms of worsening disease was discussed.  The patient will call or go to ER should those symptoms develop.  Cont home meds, and cont diet and exercised. All questions were answered.

## 2018-11-07 NOTE — PROGRESS NOTES
I assume primary medical responsibility for this patient, I have reviewed the case history, findings, diagnosis and treatment plan with the resident and agree that the care is reasonable and necessary. This service has been performed by a resident without the presence of a teaching physician under the primary care exception  Sangeetha Urias  11/7/2018

## 2018-11-09 ENCOUNTER — TELEPHONE (OUTPATIENT)
Dept: INTERNAL MEDICINE | Facility: CLINIC | Age: 63
End: 2018-11-09

## 2018-11-09 DIAGNOSIS — G89.29 CHRONIC MIDLINE LOW BACK PAIN WITH LEFT-SIDED SCIATICA: ICD-10-CM

## 2018-11-09 DIAGNOSIS — M54.42 CHRONIC MIDLINE LOW BACK PAIN WITH LEFT-SIDED SCIATICA: ICD-10-CM

## 2018-11-09 RX ORDER — IBUPROFEN 800 MG/1
TABLET ORAL
Qty: 60 TABLET | Refills: 3 | OUTPATIENT
Start: 2018-11-09

## 2018-11-09 NOTE — TELEPHONE ENCOUNTER
Medication refused again.  I refused this medication refill on October 23rd.  Patient needs to have blood work 1st in order to determine if it is still safe for him to use the ibuprofen. Please call to notify the patient.

## 2018-11-12 ENCOUNTER — OFFICE VISIT (OUTPATIENT)
Dept: ORTHOPEDICS | Facility: CLINIC | Age: 63
End: 2018-11-12
Payer: MEDICARE

## 2018-11-12 VITALS
SYSTOLIC BLOOD PRESSURE: 160 MMHG | HEIGHT: 66 IN | WEIGHT: 232.13 LBS | BODY MASS INDEX: 37.31 KG/M2 | HEART RATE: 58 BPM | DIASTOLIC BLOOD PRESSURE: 90 MMHG

## 2018-11-12 DIAGNOSIS — S62.631D OPEN DISPLACED FRACTURE OF DISTAL PHALANX OF LEFT INDEX FINGER WITH ROUTINE HEALING, SUBSEQUENT ENCOUNTER: Primary | ICD-10-CM

## 2018-11-12 PROCEDURE — 3008F BODY MASS INDEX DOCD: CPT | Mod: CPTII,S$GLB,, | Performed by: ORTHOPAEDIC SURGERY

## 2018-11-12 PROCEDURE — 99999 PR PBB SHADOW E&M-EST. PATIENT-LVL III: CPT | Mod: PBBFAC,,, | Performed by: ORTHOPAEDIC SURGERY

## 2018-11-12 PROCEDURE — 3080F DIAST BP >= 90 MM HG: CPT | Mod: CPTII,S$GLB,, | Performed by: ORTHOPAEDIC SURGERY

## 2018-11-12 PROCEDURE — 3077F SYST BP >= 140 MM HG: CPT | Mod: CPTII,S$GLB,, | Performed by: ORTHOPAEDIC SURGERY

## 2018-11-12 PROCEDURE — 99213 OFFICE O/P EST LOW 20 MIN: CPT | Mod: S$GLB,,, | Performed by: ORTHOPAEDIC SURGERY

## 2018-11-12 RX ORDER — HYDROCODONE BITARTRATE AND ACETAMINOPHEN 7.5; 325 MG/1; MG/1
1 TABLET ORAL EVERY 6 HOURS PRN
Qty: 28 TABLET | Refills: 0 | Status: SHIPPED | OUTPATIENT
Start: 2018-11-12 | End: 2018-11-19

## 2018-11-12 RX ORDER — SULFAMETHOXAZOLE AND TRIMETHOPRIM 800; 160 MG/1; MG/1
1 TABLET ORAL 2 TIMES DAILY
Qty: 28 TABLET | Refills: 0 | Status: SHIPPED | OUTPATIENT
Start: 2018-11-12 | End: 2019-01-09

## 2018-11-12 NOTE — PROGRESS NOTES
[facility]  History & Physical  Orthopedics    SUBJECTIVE:      Chief Complaint: Left index finger injury    Referring Provider: No ref. provider found     History of Present Illness:  Patient is a 62 y.o. male who presents today with complaints of left index finger injury.  It got caught in a blower fan blade 10/25/18.  He went to the ED and underwent I&D with primary closure and nailbed repair.  He has been on abx and tetanus is UTD.  He presents for evaluation.    Interval history 11/12/18: Pt returns today overall feeling better.  He has been in a splint since last visit.  He denies significant drainage and remains on Bactrim.    Onset of symptoms/DOI was 10/25/18.    Symptoms are aggravated by activity and movement.    Symptoms are alleviated by rest.    Symptoms consist of pain, swelling, ecchymosis, laceration and decreased ROM.    The patient rates their pain as a moderate    Attempted treatment(s) and/or interventions include rest, narcotic medications, immobilization, elevation, I&D, antibiotic therapy and splinting/casting.     The patient denies any fevers, chills, N/V, D/C and presents for evaluation.       Past Medical History:   Diagnosis Date    Allergy     Back pain     Hypertension     Mixed hyperlipidemia 7/3/2018    Obesity     Reflux     Sleep apnea 6/21/2013     Past Surgical History:   Procedure Laterality Date    CHOLECYSTECTOMY  2012    CHOLECYSTECTOMY, LAPAROSCOPIC N/A 7/19/2013    Performed by [facility] at [facility] OR 2ND FLR    COLONOSCOPY N/A 11/2/2018    Performed by Mustapha Mccormack MD at [facility] ENDO (4TH FLR)    HERNIA REPAIR      REPAIR, HERNIA, UMBILICAL, AGE 5 YEARS OR OLDER N/A 7/19/2013    Performed by Virgilio Garcia [facility] at [facility] OR 2ND FLR     Review of patient's allergies indicates:   Allergen Reactions    Lactose intolerance  [lactase]      Other reaction(s): Diarrhea    Penicillins Other (See Comments)     Swelling of lymph nodes      Social History     Social History Narrative    Not on file     Family History   Problem Relation Age of Onset    Cancer Mother     Heart disease Father     Colon cancer Neg Hx     Esophageal cancer Neg Hx          Current Outpatient Medications:     aluminum hydroxide-magnesium carbonate (GAVISCON) suspension, Take 5 mLs by mouth every 6 (six) hours as needed., Disp: , Rfl:     amLODIPine (NORVASC) 10 MG tablet, Take 1 tablet (10 mg total) by mouth once daily., Disp: 90 tablet, Rfl: 3    aspirin 81 MG Chew, Take 1 tablet (81 mg total) by mouth once daily., Disp: , Rfl: 0    atorvastatin (LIPITOR) 80 MG tablet, Take 1 tablet (80 mg total) by mouth once daily., Disp: 90 tablet, Rfl: 3    cetirizine (ZYRTEC) 10 MG tablet, Take 10 mg by mouth once daily., Disp: , Rfl:     cholestyramine, with sugar, 4 gram Powd, MIX 1 PACKET IN AT LEAST 6 OUNCES OF LIQUID AND TAKE BY MOUTH TWICE DAILY WITH MEALS, Disp: 60 g, Rfl: 5    diphenhydrAMINE (BENADRYL) 25 mg capsule, Take by mouth. 1 Capsule Oral Every evening, Disp: , Rfl:     diphth,pertus,acell,,tetanus (BOOSTRIX) 2.5-8-5 Lf-mcg-Lf/0.5mL Syrg injection, Inject into the muscle., Disp: 0.5 mL, Rfl: 0     mg tablet, TAKE 1 TABLET (800 MG TOTAL) BY MOUTH 3 (THREE) TIMES DAILY AS NEEDED (SCIATICA)., Disp: , Rfl: 3    losartan-hydrochlorothiazide 100-25 mg (HYZAAR) 100-25 mg per tablet, TAKE ONE(1) TABLET BY MOUTH EVERY DAY, Disp: 90 tablet, Rfl: 3    potassium chloride (MICRO-K) 10 MEQ CpSR, Take 1 capsule (10 mEq total) by mouth once daily., Disp: 30 capsule, Rfl: 2    sulfamethoxazole-trimethoprim 800-160mg (BACTRIM DS) 800-160 mg Tab, Take 1 tablet by mouth 2 (two) times daily., Disp: 28 tablet, Rfl: 0    sulfamethoxazole-trimethoprim 800-160mg (BACTRIM DS) 800-160 mg Tab, Take 1 tablet by mouth 2 (two) times daily., Disp: 28 tablet, Rfl: 0      Review of Systems:  Constitutional: no fever or chills  Eyes: no visual changes  ENT: no nasal  "congestion or sore throat  Respiratory: no cough or shortness of breath  Cardiovascular: no chest pain  Gastrointestinal: no nausea or vomiting, tolerating diet  Musculoskeletal: pain, soreness, decreased ROM, wound and laceration    OBJECTIVE:      Vital Signs (Most Recent):  Vitals:    11/12/18 1439   BP: (!) 160/90   BP Location: Left arm   Patient Position: Sitting   BP Method: Large (Automatic)   Pulse: (!) 58   Weight: 105.3 kg (232 lb 2.3 oz)   Height: 5' 6" (1.676 m)     Body mass index is 37.47 kg/m².      Physical Exam:  Constitutional: The patient appears well-developed and well-nourished. No distress.   Head: Normocephalic and atraumatic.   Nose: Nose normal.   Eyes: Conjunctivae and EOM are normal.   Neck: No tracheal deviation present.   Cardiovascular: Normal rate and intact distal pulses.    Pulmonary/Chest: Effort normal. No respiratory distress.   Abdominal: There is no guarding.   Neurological: The patient is alert.   Psychiatric: The patient has a normal mood and affect.     Left Hand/Wrist Examination:    Observation/Inspection:  Swelling  +    Deformity  none  Discoloration  +    Scars   none    Atrophy  None  Left IF with significant soft tissue injury s/p complex primary repair, fully closed now.  The ulnar pulp has some questionable skin viability 1x1cm.  Minimal seropurulent drainage when squeezed, no apparent deep fluid collections.  Able to actively flex DIP somewhat though limited.  Nail replaced with xeroform splint.  NVI to radial/digital nerves.  Brisk CR to fingertip.    HAND/WRIST EXAMINATION:  Finkelstein's Test   Neg  Snuff box tenderness   Neg  Graham's Test    Neg  Hook of Hamate Tenderness  Neg  CMC grind    Neg  Circumduction test   Neg    Neurovascular Exam:  Digits WWP, brisk CR < 3s throughout  NVI motor/LTS to M/R/U nerves, radial pulse 2+  Tinel's Test - Carpal Tunnel  Neg  Tinel's Test - Cubital Tunnel  Neg  Phalen's Test    Neg  Median Nerve Compression Test Neg    ROM " hand/wrist/elbow full, painless      Diagnostic Results:     Xray - fracture of distal phalanx left index finger  EMG - none    ASSESSMENT/PLAN:      62 y.o. yo male with significant trauma to left IF with open P3 fracture and significant soft tissue injury s/p I&D with primary repair in the ED  1) At this time, fracture fixation is precluded by soft tissue injury.  Patient understands the risk of fracture nonunion/malunion or even need for future amputation depending on bony and soft tissue healing.  FDP intact by exam today.  2) We will watch this wound closely to determine need for I&D or delayed intervention.  For now, no sign of deep infection and closure is adequate so we will give this time.    3) Bactrim refilled  4) Norco today  5) Shower daily with gentle soap/water cleanse.  No soaking in tub.    6) Wound redressed, sutures removed, nail splint DC'd  7) Daily dressing chagnes and soaks per instructions given to patient  8) OT referral for stiffness/wound care          Samir Leija M.D.

## 2018-12-03 ENCOUNTER — HOSPITAL ENCOUNTER (OUTPATIENT)
Dept: RADIOLOGY | Facility: OTHER | Age: 63
Discharge: HOME | End: 2018-12-03
Attending: ORTHOPAEDIC SURGERY
Payer: MEDICARE

## 2018-12-03 ENCOUNTER — OFFICE VISIT (OUTPATIENT)
Dept: ORTHOPEDICS | Facility: CLINIC | Age: 63
End: 2018-12-03
Payer: MEDICARE

## 2018-12-03 VITALS
DIASTOLIC BLOOD PRESSURE: 100 MMHG | SYSTOLIC BLOOD PRESSURE: 156 MMHG | BODY MASS INDEX: 37.31 KG/M2 | HEIGHT: 66 IN | WEIGHT: 232.13 LBS | HEART RATE: 64 BPM

## 2018-12-03 DIAGNOSIS — S67.10XA CRUSH INJURY TO FINGER, INITIAL ENCOUNTER: Primary | ICD-10-CM

## 2018-12-03 DIAGNOSIS — S67.10XA CRUSH INJURY TO FINGER, INITIAL ENCOUNTER: ICD-10-CM

## 2018-12-03 PROCEDURE — 3080F DIAST BP >= 90 MM HG: CPT | Mod: CPTII,S$GLB,, | Performed by: ORTHOPAEDIC SURGERY

## 2018-12-03 PROCEDURE — 99999 PR PBB SHADOW E&M-EST. PATIENT-LVL IV: CPT | Mod: PBBFAC,,, | Performed by: ORTHOPAEDIC SURGERY

## 2018-12-03 PROCEDURE — 3077F SYST BP >= 140 MM HG: CPT | Mod: CPTII,S$GLB,, | Performed by: ORTHOPAEDIC SURGERY

## 2018-12-03 PROCEDURE — 99213 OFFICE O/P EST LOW 20 MIN: CPT | Mod: S$GLB,,, | Performed by: ORTHOPAEDIC SURGERY

## 2018-12-03 PROCEDURE — 73140 X-RAY EXAM OF FINGER(S): CPT | Mod: TC,FY

## 2018-12-03 PROCEDURE — 73140 X-RAY EXAM OF FINGER(S): CPT | Mod: 26,LT,, | Performed by: RADIOLOGY

## 2018-12-03 PROCEDURE — 3008F BODY MASS INDEX DOCD: CPT | Mod: CPTII,S$GLB,, | Performed by: ORTHOPAEDIC SURGERY

## 2018-12-03 NOTE — PROGRESS NOTES
[facility]  History & Physical  Orthopedics    SUBJECTIVE:      Chief Complaint: Left index finger injury    Referring Provider: No ref. provider found     History of Present Illness:  Patient is a 62 y.o. male who presents today with complaints of left index finger injury.  It got caught in a blower fan blade 10/25/18.  He went to the ED and underwent I&D with primary closure and nailbed repair.  He has been on abx and tetanus is UTD.  He presents for evaluation.    Interval history 12/3/18: Patient has continued Bactrim and betadine soaks daily. He states that he has seen a dramatic improvement in the healing of the finger. He denies drainage. Denies fevers. He does have some pain at the PIP joint when he attempts to flex the finger.     Onset of symptoms/DOI was 10/25/18.    Symptoms are aggravated by activity and movement.    Symptoms are alleviated by rest.    Symptoms consist of pain, swelling, ecchymosis, laceration and decreased ROM.    The patient rates their pain as a moderate    Attempted treatment(s) and/or interventions include rest, narcotic medications, immobilization, elevation, I&D, antibiotic therapy and splinting/casting.     The patient denies any fevers, chills, N/V, D/C and presents for evaluation.       Past Medical History:   Diagnosis Date    Allergy     Back pain     Hypertension     Mixed hyperlipidemia 7/3/2018    Obesity     Reflux     Sleep apnea 6/21/2013     Past Surgical History:   Procedure Laterality Date    CHOLECYSTECTOMY  2012    CHOLECYSTECTOMY, LAPAROSCOPIC N/A 7/19/2013    Performed by [facility] at [facility] OR 2ND FLR    COLONOSCOPY N/A 11/2/2018    Performed by Mustapha Mccormack MD at [facility] ENDO (4TH FLR)    HERNIA REPAIR      REPAIR, HERNIA, UMBILICAL, AGE 5 YEARS OR OLDER N/A 7/19/2013    Performed by Virgilio Garcia [facility] at [facility] OR 2ND FLR     Review of patient's allergies indicates:   Allergen Reactions    Lactose intolerance   [lactase]      Other reaction(s): Diarrhea    Penicillins Other (See Comments)     Swelling of lymph nodes     Social History     Social History Narrative    Not on file     Family History   Problem Relation Age of Onset    Cancer Mother     Heart disease Father     Colon cancer Neg Hx     Esophageal cancer Neg Hx          Current Outpatient Medications:     aluminum hydroxide-magnesium carbonate (GAVISCON) suspension, Take 5 mLs by mouth every 6 (six) hours as needed., Disp: , Rfl:     amLODIPine (NORVASC) 10 MG tablet, Take 1 tablet (10 mg total) by mouth once daily., Disp: 90 tablet, Rfl: 3    aspirin 81 MG Chew, Take 1 tablet (81 mg total) by mouth once daily., Disp: , Rfl: 0    atorvastatin (LIPITOR) 80 MG tablet, Take 1 tablet (80 mg total) by mouth once daily., Disp: 90 tablet, Rfl: 3    cetirizine (ZYRTEC) 10 MG tablet, Take 10 mg by mouth once daily., Disp: , Rfl:     cholestyramine, with sugar, 4 gram Powd, MIX 1 PACKET IN AT LEAST 6 OUNCES OF LIQUID AND TAKE BY MOUTH TWICE DAILY WITH MEALS, Disp: 60 g, Rfl: 5    diphenhydrAMINE (BENADRYL) 25 mg capsule, Take by mouth. 1 Capsule Oral Every evening, Disp: , Rfl:     diphth,pertus,acell,,tetanus (BOOSTRIX) 2.5-8-5 Lf-mcg-Lf/0.5mL Syrg injection, Inject into the muscle., Disp: 0.5 mL, Rfl: 0     mg tablet, TAKE 1 TABLET (800 MG TOTAL) BY MOUTH 3 (THREE) TIMES DAILY AS NEEDED (SCIATICA)., Disp: , Rfl: 3    losartan-hydrochlorothiazide 100-25 mg (HYZAAR) 100-25 mg per tablet, TAKE ONE(1) TABLET BY MOUTH EVERY DAY, Disp: 90 tablet, Rfl: 3    potassium chloride (MICRO-K) 10 MEQ CpSR, Take 1 capsule (10 mEq total) by mouth once daily., Disp: 30 capsule, Rfl: 2    sulfamethoxazole-trimethoprim 800-160mg (BACTRIM DS) 800-160 mg Tab, Take 1 tablet by mouth 2 (two) times daily., Disp: 28 tablet, Rfl: 0    sulfamethoxazole-trimethoprim 800-160mg (BACTRIM DS) 800-160 mg Tab, Take 1 tablet by mouth 2 (two) times daily., Disp: 28 tablet, Rfl:  "0      Review of Systems:  Constitutional: no fever or chills  Eyes: no visual changes  ENT: no nasal congestion or sore throat  Respiratory: no cough or shortness of breath  Cardiovascular: no chest pain  Gastrointestinal: no nausea or vomiting, tolerating diet  Musculoskeletal: pain, soreness, decreased ROM, wound and laceration    OBJECTIVE:      Vital Signs (Most Recent):  Vitals:    12/03/18 1536   BP: (!) 156/100   BP Location: Left arm   Patient Position: Sitting   BP Method: Large (Automatic)   Pulse: 64   Weight: 105.3 kg (232 lb 2.3 oz)   Height: 5' 6" (1.676 m)     Body mass index is 37.47 kg/m².      Physical Exam:  Constitutional: The patient appears well-developed and well-nourished. No distress.   Head: Normocephalic and atraumatic.   Nose: Nose normal.   Eyes: Conjunctivae and EOM are normal.   Neck: No tracheal deviation present.   Cardiovascular: Normal rate and intact distal pulses.    Pulmonary/Chest: Effort normal. No respiratory distress.   Abdominal: There is no guarding.   Neurological: The patient is alert.   Psychiatric: The patient has a normal mood and affect.     Left Hand/Wrist Examination:    Observation/Inspection:  Swelling  +    Deformity  none  Discoloration  +    Scars   none    Atrophy  None  Left IF with significant soft tissue injury s/p complex primary repair, fully closed now.  The ulnar pulp looks much better today.  Minimal blood drainage when squeezed, no apparent deep fluid collections.  Limited active flexion at DIP and PIP.  NVI to radial/digital nerves.  Brisk CR to fingertip. Skin coverage and viability much improved.     HAND/WRIST EXAMINATION:  Finkelstein's Test   Neg  Snuff box tenderness   Neg  Graham's Test    Neg  Hook of Hamate Tenderness  Neg  CMC grind    Neg  Circumduction test   Neg    Neurovascular Exam:  Digits WWP, brisk CR < 3s throughout  NVI motor/LTS to M/R/U nerves, radial pulse 2+  Tinel's Test - Carpal Tunnel  Neg  Tinel's Test - Cubital " Tunnel  Neg  Phalen's Test    Neg  Median Nerve Compression Test Neg    ROM hand/wrist/elbow full, painless      Diagnostic Results:     Xray - fracture of distal phalanx left index finger  EMG - none    ASSESSMENT/PLAN:      62 y.o. yo male with significant trauma to left IF with open P3 fracture and significant soft tissue injury s/p I&D with primary repair in the ED  1) At this time, fracture fixation is precluded by soft tissue injury.  Patient understands the risk of fracture nonunion/malunion or even need for future amputation depending on bony and soft tissue healing.  FDP intact by exam today.  2) No sign of deep infection, skin and soft tissue viability appears much more likely upon exam today    3) Can discontinue Bactrim when out of pills  4) NSAIDs and Norco for pain control  5) Shower daily with gentle soap/water cleanse.  No soaking in tub.    6) no dressing needed at this point  7) Continue soaks per instructions given to patient  8) OT referral for stiffness/wound care  9) RTC in 6 weeks for wound recheck        Samir Leija M.D.

## 2019-01-09 ENCOUNTER — OFFICE VISIT (OUTPATIENT)
Dept: INTERNAL MEDICINE | Facility: CLINIC | Age: 64
End: 2019-01-09
Payer: MEDICARE

## 2019-01-09 VITALS
BODY MASS INDEX: 37.6 KG/M2 | DIASTOLIC BLOOD PRESSURE: 92 MMHG | WEIGHT: 233.94 LBS | HEART RATE: 54 BPM | SYSTOLIC BLOOD PRESSURE: 170 MMHG | HEIGHT: 66 IN

## 2019-01-09 DIAGNOSIS — Z12.5 PROSTATE CANCER SCREENING: ICD-10-CM

## 2019-01-09 DIAGNOSIS — E66.01 SEVERE OBESITY (BMI 35.0-39.9) WITH COMORBIDITY: ICD-10-CM

## 2019-01-09 DIAGNOSIS — M54.42 CHRONIC MIDLINE LOW BACK PAIN WITH LEFT-SIDED SCIATICA: ICD-10-CM

## 2019-01-09 DIAGNOSIS — E78.2 MIXED HYPERLIPIDEMIA: ICD-10-CM

## 2019-01-09 DIAGNOSIS — I10 ESSENTIAL HYPERTENSION: Primary | ICD-10-CM

## 2019-01-09 DIAGNOSIS — G89.29 CHRONIC MIDLINE LOW BACK PAIN WITH LEFT-SIDED SCIATICA: ICD-10-CM

## 2019-01-09 DIAGNOSIS — J31.0 CHRONIC RHINITIS: ICD-10-CM

## 2019-01-09 DIAGNOSIS — R73.01 IMPAIRED FASTING GLUCOSE: ICD-10-CM

## 2019-01-09 PROBLEM — S62.631B OPEN DISPLACED FRACTURE OF DISTAL PHALANX OF LEFT INDEX FINGER: Status: RESOLVED | Noted: 2018-10-25 | Resolved: 2019-01-09

## 2019-01-09 PROBLEM — S67.10XA CRUSH INJURY TO FINGER, INITIAL ENCOUNTER: Status: RESOLVED | Noted: 2018-10-29 | Resolved: 2019-01-09

## 2019-01-09 PROCEDURE — 3080F PR MOST RECENT DIASTOLIC BLOOD PRESSURE >= 90 MM HG: ICD-10-PCS | Mod: CPTII,S$GLB,, | Performed by: INTERNAL MEDICINE

## 2019-01-09 PROCEDURE — 99214 PR OFFICE/OUTPT VISIT, EST, LEVL IV, 30-39 MIN: ICD-10-PCS | Mod: S$GLB,,, | Performed by: INTERNAL MEDICINE

## 2019-01-09 PROCEDURE — 99999 PR PBB SHADOW E&M-EST. PATIENT-LVL III: ICD-10-PCS | Mod: PBBFAC,,, | Performed by: INTERNAL MEDICINE

## 2019-01-09 PROCEDURE — 3008F BODY MASS INDEX DOCD: CPT | Mod: CPTII,S$GLB,, | Performed by: INTERNAL MEDICINE

## 2019-01-09 PROCEDURE — 1101F PT FALLS ASSESS-DOCD LE1/YR: CPT | Mod: CPTII,S$GLB,, | Performed by: INTERNAL MEDICINE

## 2019-01-09 PROCEDURE — 1101F PR PT FALLS ASSESS DOC 0-1 FALLS W/OUT INJ PAST YR: ICD-10-PCS | Mod: CPTII,S$GLB,, | Performed by: INTERNAL MEDICINE

## 2019-01-09 PROCEDURE — 99499 UNLISTED E&M SERVICE: CPT | Mod: S$GLB,,, | Performed by: INTERNAL MEDICINE

## 2019-01-09 PROCEDURE — 3008F PR BODY MASS INDEX (BMI) DOCUMENTED: ICD-10-PCS | Mod: CPTII,S$GLB,, | Performed by: INTERNAL MEDICINE

## 2019-01-09 PROCEDURE — 3080F DIAST BP >= 90 MM HG: CPT | Mod: CPTII,S$GLB,, | Performed by: INTERNAL MEDICINE

## 2019-01-09 PROCEDURE — 3077F PR MOST RECENT SYSTOLIC BLOOD PRESSURE >= 140 MM HG: ICD-10-PCS | Mod: CPTII,S$GLB,, | Performed by: INTERNAL MEDICINE

## 2019-01-09 PROCEDURE — 99999 PR PBB SHADOW E&M-EST. PATIENT-LVL III: CPT | Mod: PBBFAC,,, | Performed by: INTERNAL MEDICINE

## 2019-01-09 PROCEDURE — 99214 OFFICE O/P EST MOD 30 MIN: CPT | Mod: S$GLB,,, | Performed by: INTERNAL MEDICINE

## 2019-01-09 PROCEDURE — 3077F SYST BP >= 140 MM HG: CPT | Mod: CPTII,S$GLB,, | Performed by: INTERNAL MEDICINE

## 2019-01-09 PROCEDURE — 99499 RISK ADDL DX/OHS AUDIT: ICD-10-PCS | Mod: S$GLB,,, | Performed by: INTERNAL MEDICINE

## 2019-01-09 RX ORDER — VALSARTAN AND HYDROCHLOROTHIAZIDE 320; 25 MG/1; MG/1
1 TABLET, FILM COATED ORAL DAILY
Qty: 90 TABLET | Refills: 3 | Status: SHIPPED | OUTPATIENT
Start: 2019-01-09 | End: 2019-03-01

## 2019-01-09 NOTE — PROGRESS NOTES
Subjective:       Patient ID: Isreal Randolph is a 63 y.o. male.    Chief Complaint: Annual Exam    Pt here to [facility]. Pt's BP is not well controlled. Tolerating meds well but says his BP was better controlled when he was taking diovan/hct and nisoldipine. Pt denies cp/sob/ha/vision or neuro changes. Checking at home and is not well controlled.     HLD is tx with lipitor which he tolerates well. Last lipid panel was still quite high but he was not taking higher dose of lipitor at the time.     He has chronic rhinitis for which current meds help.     He has impaired fasting glucose with last A1c under 6.5. No symptoms of diabetes. We reviewed wt loss and diet goals/strategies.     He has chronic intermittent diarrhea related to h/o cholecystectomy. He takes cholestyramine prn which helps. He has seen GI for this in the past.     He has chronic bilat low back pain with radicular symptoms on L. No associated weakness/paresthesias or bowel/bladder issues. No saddle anesthesia.           Review of Systems   Constitutional: Negative for unexpected weight change.   Eyes: Negative for visual disturbance.   Respiratory: Negative for shortness of breath.    Cardiovascular: Negative for chest pain.   Endocrine: Negative for polyuria.   Neurological: Negative for headaches.   Psychiatric/Behavioral: Negative for dysphoric mood.       Objective:      Physical Exam   Constitutional: He is oriented to person, place, and time. He appears well-developed and well-nourished.   Eyes: EOM are normal. Pupils are equal, round, and reactive to light.   Neck: Neck supple. No thyromegaly present.   Cardiovascular: Normal rate, regular rhythm and normal heart sounds.   Pulmonary/Chest: Effort normal and breath sounds normal.   Musculoskeletal: He exhibits no edema.   Lymphadenopathy:     He has no cervical adenopathy.   Neurological: He is alert and oriented to person, place, and time. No cranial nerve deficit.   Psychiatric: He has a normal  mood and affect. His behavior is normal.       Assessment:       1. Essential hypertension    2. Mixed hyperlipidemia    3. Chronic rhinitis    4. Impaired fasting glucose    5. Severe obesity (BMI 35.0-39.9) with comorbidity    6. Chronic midline low back pain with left-sided sciatica    7. Prostate cancer screening        Plan:       1. Appropriate labs  2. R/b of psa d/w pt and he wishes to proceed  3. Change losartan/hct to valsartan/hct 320/25mg daily  4. Home BP monitoring and f/u 2 wks nursing BP check  5. Will change amlodipine to nisoldipine if not controlled as pt was controlled on this regimen previously

## 2019-01-14 ENCOUNTER — OFFICE VISIT (OUTPATIENT)
Dept: ORTHOPEDICS | Facility: CLINIC | Age: 64
End: 2019-01-14
Payer: MEDICARE

## 2019-01-14 VITALS
HEART RATE: 62 BPM | HEIGHT: 66 IN | BODY MASS INDEX: 37.6 KG/M2 | WEIGHT: 233.94 LBS | SYSTOLIC BLOOD PRESSURE: 150 MMHG | DIASTOLIC BLOOD PRESSURE: 84 MMHG

## 2019-01-14 DIAGNOSIS — S67.10XA CRUSH INJURY TO FINGER, INITIAL ENCOUNTER: Primary | ICD-10-CM

## 2019-01-14 PROCEDURE — 1101F PT FALLS ASSESS-DOCD LE1/YR: CPT | Mod: CPTII,S$GLB,, | Performed by: ORTHOPAEDIC SURGERY

## 2019-01-14 PROCEDURE — 99213 OFFICE O/P EST LOW 20 MIN: CPT | Mod: S$GLB,,, | Performed by: ORTHOPAEDIC SURGERY

## 2019-01-14 PROCEDURE — 3077F SYST BP >= 140 MM HG: CPT | Mod: CPTII,S$GLB,, | Performed by: ORTHOPAEDIC SURGERY

## 2019-01-14 PROCEDURE — 3079F PR MOST RECENT DIASTOLIC BLOOD PRESSURE 80-89 MM HG: ICD-10-PCS | Mod: CPTII,S$GLB,, | Performed by: ORTHOPAEDIC SURGERY

## 2019-01-14 PROCEDURE — 99213 PR OFFICE/OUTPT VISIT, EST, LEVL III, 20-29 MIN: ICD-10-PCS | Mod: S$GLB,,, | Performed by: ORTHOPAEDIC SURGERY

## 2019-01-14 PROCEDURE — 99999 PR PBB SHADOW E&M-EST. PATIENT-LVL III: ICD-10-PCS | Mod: PBBFAC,,, | Performed by: ORTHOPAEDIC SURGERY

## 2019-01-14 PROCEDURE — 3008F PR BODY MASS INDEX (BMI) DOCUMENTED: ICD-10-PCS | Mod: CPTII,S$GLB,, | Performed by: ORTHOPAEDIC SURGERY

## 2019-01-14 PROCEDURE — 3008F BODY MASS INDEX DOCD: CPT | Mod: CPTII,S$GLB,, | Performed by: ORTHOPAEDIC SURGERY

## 2019-01-14 PROCEDURE — 99999 PR PBB SHADOW E&M-EST. PATIENT-LVL III: CPT | Mod: PBBFAC,,, | Performed by: ORTHOPAEDIC SURGERY

## 2019-01-14 PROCEDURE — 1101F PR PT FALLS ASSESS DOC 0-1 FALLS W/OUT INJ PAST YR: ICD-10-PCS | Mod: CPTII,S$GLB,, | Performed by: ORTHOPAEDIC SURGERY

## 2019-01-14 PROCEDURE — 3077F PR MOST RECENT SYSTOLIC BLOOD PRESSURE >= 140 MM HG: ICD-10-PCS | Mod: CPTII,S$GLB,, | Performed by: ORTHOPAEDIC SURGERY

## 2019-01-14 PROCEDURE — 3079F DIAST BP 80-89 MM HG: CPT | Mod: CPTII,S$GLB,, | Performed by: ORTHOPAEDIC SURGERY

## 2019-01-14 NOTE — PROGRESS NOTES
[facility]  History & Physical  Orthopedics    SUBJECTIVE:      Chief Complaint: Left index finger injury    Referring Provider: No ref. provider found     History of Present Illness:  Patient is a 63 y.o. male who presents today with complaints of left index finger injury.  It got caught in a blower fan blade 10/25/18.  He went to the ED and underwent I&D with primary closure and nailbed repair.  He has been on abx and tetanus is UTD.  He presents for evaluation.    Interval history 12/3/18: Patient has continued Bactrim and betadine soaks daily. He states that he has seen a dramatic improvement in the healing of the finger. He denies drainage. Denies fevers. He does have some pain at the PIP joint when he attempts to flex the finger.     Interval History: 1/14/19: Patient reports finger being well healed for some time now. Unfortunately he has not been in OT yet and the finger has gotten stiff on him. He is stiff at both PIP and DIP. He is otherwise doing well with minimal pain. It will swell after vigorous activity.     Onset of symptoms/DOI was 10/25/18.    Symptoms are aggravated by activity and movement.    Symptoms are alleviated by rest.    Symptoms consist of pain, swelling, ecchymosis, laceration and decreased ROM.    The patient rates their pain as a moderate    Attempted treatment(s) and/or interventions include rest, narcotic medications, immobilization, elevation, I&D, antibiotic therapy and splinting/casting.     The patient denies any fevers, chills, N/V, D/C and presents for evaluation.       Past Medical History:   Diagnosis Date    Allergy     Back pain     Hypertension     Mixed hyperlipidemia 7/3/2018    Obesity     Reflux     Sleep apnea 6/21/2013     Past Surgical History:   Procedure Laterality Date    CHOLECYSTECTOMY  2012    CHOLECYSTECTOMY, LAPAROSCOPIC N/A 7/19/2013    Performed by [facility] F[facility]    COLONOSCOPY N/A 11/2/2018     Performed by [facility] at [facility] ENDO (4TH FLR)    HERNIA REPAIR      REPAIR, HERNIA, UMBILICAL, AGE 5 YEARS OR OLDER N/A 7/19/2013    Performed by Virgilio Garcia [facility] at [facility] OR 2ND FLR     Review of patient's allergies indicates:   Allergen Reactions    Lactose intolerance  [lactase]      Other reaction(s): Diarrhea    Penicillins Other (See Comments)     Swelling of lymph nodes     Social History     Social History Narrative    Not on file     Family History   Problem Relation Age of Onset    Cancer Mother     Heart disease Father     Colon cancer Neg Hx     Esophageal cancer Neg Hx          Current Outpatient Medications:     amLODIPine (NORVASC) 10 MG tablet, Take 1 tablet (10 mg total) by mouth once daily., Disp: 90 tablet, Rfl: 3    aspirin 81 MG Chew, Take 1 tablet (81 mg total) by mouth once daily., Disp: , Rfl: 0    atorvastatin (LIPITOR) 80 MG tablet, Take 1 tablet (80 mg total) by mouth once daily., Disp: 90 tablet, Rfl: 3    cetirizine (ZYRTEC) 10 MG tablet, Take 10 mg by mouth once daily., Disp: , Rfl:     cholestyramine, with sugar, 4 gram Powd, MIX 1 PACKET IN AT LEAST 6 OUNCES OF LIQUID AND TAKE BY MOUTH TWICE DAILY WITH MEALS, Disp: 60 g, Rfl: 5    diphenhydrAMINE (BENADRYL) 25 mg capsule, Take by mouth. 1 Capsule Oral Every evening, Disp: , Rfl:      mg tablet, TAKE 1 TABLET (800 MG TOTAL) BY MOUTH 3 (THREE) TIMES DAILY AS NEEDED (SCIATICA)., Disp: , Rfl: 3    valsartan-hydrochlorothiazide (DIOVAN-HCT) 320-25 mg per tablet, Take 1 tablet by mouth once daily., Disp: 90 tablet, Rfl: 3      Review of Systems:  Constitutional: no fever or chills  Eyes: no visual changes  ENT: no nasal congestion or sore throat  Respiratory: no cough or shortness of breath  Cardiovascular: no chest pain  Gastrointestinal: no nausea or vomiting, tolerating diet  Musculoskeletal: pain, soreness, decreased ROM, wound and laceration    OBJECTIVE:      Vital Signs (Most Recent):  Vitals:     "01/14/19 0938   BP: (!) 150/84   BP Location: Left arm   Patient Position: Sitting   BP Method: Large (Automatic)   Pulse: 62   Weight: 106.1 kg (233 lb 14.5 oz)   Height: 5' 6" (1.676 m)     Body mass index is 37.75 kg/m².      Physical Exam:  Constitutional: The patient appears well-developed and well-nourished. No distress.   Head: Normocephalic and atraumatic.   Nose: Nose normal.   Eyes: Conjunctivae and EOM are normal.   Neck: No tracheal deviation present.   Cardiovascular: Normal rate and intact distal pulses.    Pulmonary/Chest: Effort normal. No respiratory distress.   Abdominal: There is no guarding.   Neurological: The patient is alert.   Psychiatric: The patient has a normal mood and affect.     Left Hand/Wrist Examination:    Observation/Inspection:  Swelling  +    Deformity  none  Discoloration  +    Scars   none    Atrophy  None  Left IF with significant soft tissue injury s/p complex primary repair, fully closed now.  The ulnar pulp looks much better today.  Minimal blood drainage when squeezed, no apparent deep fluid collections.  Limited active flexion at DIP and PIP.  NVI to radial/digital nerves.  Brisk CR to fingertip. Skin coverage and viability much improved.     HAND/WRIST EXAMINATION:  Finkelstein's Test   Neg  Snuff box tenderness   Neg  Graham's Test    Neg  Hook of Hamate Tenderness  Neg  CMC grind    Neg  Circumduction test   Neg    Neurovascular Exam:  Digits WWP, brisk CR < 3s throughout  NVI motor/LTS to M/R/U nerves, radial pulse 2+  Tinel's Test - Carpal Tunnel  Neg  Tinel's Test - Cubital Tunnel  Neg  Phalen's Test    Neg  Median Nerve Compression Test Neg    ROM 30 degrees of flexion at DIP with essentially no active ROM at DIP    Diagnostic Results:     Xray - fracture of distal phalanx left index finger  EMG - none    ASSESSMENT/PLAN:      63 y.o. yo male with significant trauma to left IF with open P3 fracture and significant soft tissue injury s/p I&D with primary repair in " the ED. Now ell healed but with loss of active ROM at PIP, DIP.     1) At this time his soft tissue is well healed. He does have significantly limited ROM at DIP and PIP  2) Will again give patient referral for OT, he will need aggressive therapy to regain ROM  3) F/U in 3 months      Samir Leija M.D.

## 2019-02-28 PROBLEM — R73.03 PRE-DIABETES: Status: ACTIVE | Noted: 2018-07-03

## 2019-02-28 PROBLEM — D64.9 NORMOCYTIC ANEMIA: Status: ACTIVE | Noted: 2019-02-28

## 2019-02-28 PROBLEM — E87.6 HYPOKALEMIA: Status: ACTIVE | Noted: 2019-02-28

## 2019-02-28 NOTE — PROGRESS NOTES
Subjective:       Patient ID: Isreal Randolph is a 63 y.o. male who  has a past medical history of Allergy, Back pain, Hypertension, Mixed hyperlipidemia (7/3/2018), Obesity, Reflux, and Sleep apnea (6/21/2013).    Chief Complaint: Hypertension     HPI    History was obtained from the patient and supplemented through chart review.  Was seen by Dr. Narayanan due to uncontrolled BP.    HTN:    Losartan 100-HCTZ 12.5 was switched to valsartan 320-HCTZ 25 due to uncontrolled BP.     Nisoldipine was switched to Norvasc 10.  He is compliant with both Norvasc 10, valsartan 320-HCTZ 25 (unfortunately is on the med recall list). Tolerating meds well. Pt denies cp/sob, lightheadedness, dizziness.  Checking BP at home, 142/82, 137/78, but BP at goal in clinic today.    Prediabetes:  Has lost some weight.  Walks sometimes, but not regularly.  Breakfast: 3 boiled eggs, toast (on white bread with butter, jam)  Lunch: none  Dinner: mustard greens, chicken  Drinks: soft drinks    Hemoglobin A1C   Date Value Ref Range Status   07/03/2018 6.1 (H) 4.0 - 5.6 % Final     Comment:     ADA Screening Guidelines:  5.7-6.4%  Consistent with prediabetes  >or=6.5%  Consistent with diabetes  High levels of fetal hemoglobin interfere with the HbA1C  assay. Heterozygous hemoglobin variants (HbS, HgC, etc)do  not significantly interfere with this assay.   However, presence of multiple variants may affect accuracy.     03/19/2010 6.3 (H) 4.0 - 6.2 % Final   01/12/2009 6.4 (H) 4.0 - 6.2 % Final     HLD:  Was told that he could stop taking Lipitor 80 and ASA 81 daily.  Lab Results   Component Value Date    LDLCALC 175.2 (H) 07/03/2018     The 10-year ASCVD risk score ([place] CHILDERS Jr., et al., 2013) is: 12%    Values used to calculate the score:      Age: 63 years      Sex: Male      Is Non- : Yes      Diabetic: No      Tobacco smoker: No      Systolic Blood Pressure: 104 mmHg      Is BP treated: Yes      HDL Cholesterol: 50 mg/dL       Total Cholesterol: 261 mg/dL    Chronic intermittent diarrhea:  History of cholecystectomy.  Cholestyramine p.r.n..  Has seen GI for this in the past.    Crush injury to left index finger, fracture, status post I&D.  With decreased ROM.  Has seen Orthopedics.  Sees OT.    Normocytic anemia:   Denies CP, SOB, lightheadedness, dizziness.  The patient denies hematochezia, melena, hematuria.  Last C scope was at age 48.    Review of Systems   Constitutional: Negative for fever and unexpected weight change.   HENT: Negative for rhinorrhea and sneezing.    Eyes: Negative for redness and itching.   Respiratory: Negative for shortness of breath and wheezing.    Cardiovascular: Negative for chest pain and palpitations.   Gastrointestinal: Positive for diarrhea. Negative for abdominal pain, blood in stool and nausea.   Genitourinary: Negative for dysuria and hematuria.   Musculoskeletal: Positive for back pain. Negative for gait problem.   Skin: Negative for color change and rash.   Neurological: Negative for dizziness and light-headedness.   Hematological: Negative for adenopathy.   Psychiatric/Behavioral: Negative for self-injury. The patient is not nervous/anxious.        Past Medical History:   Diagnosis Date    Allergy     Back pain     Hypertension     Mixed hyperlipidemia 7/3/2018    Obesity     Reflux     Sleep apnea 6/21/2013     Past Surgical History:   Procedure Laterality Date    CHOLECYSTECTOMY  2012    CHOLECYSTECTOMY, LAPAROSCOPIC N/A 7/19/2013    Performed by Virgilio Garcia MD at [facility] OR 2ND FLR    COLONOSCOPY N/A 11/2/2018    Performed by Mustapha Mccormack MD at [facility] ENDO (4TH FLR)    HERNIA REPAIR      REPAIR, HERNIA, UMBILICAL, AGE 5 YEARS OR OLDER N/A 7/19/2013    Performed by Virgilio Garcia MD at [facility] OR 2ND FLR     Family History   Problem Relation Age of Onset    Cancer Mother     Heart disease Father     Colon cancer Neg Hx     Esophageal cancer Neg Hx     Diabetes Neg Hx   "    Social History     Socioeconomic History    Marital status:      Spouse name: Not on file    Number of children: Not on file    Years of education: Not on file    Highest education level: Not on file   Social Needs    Financial resource strain: Not on file    Food insecurity - worry: Not on file    Food insecurity - inability: Not on file    Transportation needs - medical: Not on file    Transportation needs - non-medical: Not on file   Occupational History     Employer: [employer] [id number]   Tobacco Use    Smoking status: Never Smoker    Smokeless tobacco: Never Used   Substance and Sexual Activity    Alcohol use: Yes     Comment: 3 times per week    Drug use: No    Sexual activity: Yes   Other Topics Concern    Not on file   Social History Narrative    Not on file     Objective:      Vitals:    03/01/19 1039   BP: 104/62   Pulse: (!) 52   SpO2: 98%   Weight: 104.6 kg (230 lb 9.6 oz)   Height: 5' 6" (1.676 m)      Physical Exam   Constitutional: He appears well-developed and well-nourished. No distress.   HENT:   Head: Normocephalic and atraumatic.   Nose: Nose normal.   Mouth/Throat: Oropharynx is clear and moist. No oropharyngeal exudate.   Eyes: Conjunctivae and EOM are normal. Pupils are equal, round, and reactive to light. Right eye exhibits no discharge. Left eye exhibits no discharge. No scleral icterus.   Neck: Neck supple. No tracheal deviation present. No thyromegaly present.   Cardiovascular: Normal rate, regular rhythm, normal heart sounds and intact distal pulses.   No murmur heard.  Pulmonary/Chest: Effort normal and breath sounds normal. No respiratory distress. He has no wheezes.   Abdominal: Soft. Bowel sounds are normal. There is no tenderness.   Musculoskeletal: He exhibits no edema or deformity.   Reduced active range of motion of his left index finger due to crush injury   Lymphadenopathy:     He has no cervical adenopathy.   Neurological: He is alert. No cranial nerve deficit. " Gait normal.   Skin: Skin is warm and dry. Capillary refill takes less than 2 seconds. He is not diaphoretic. No erythema.   Psychiatric: He has a normal mood and affect. His behavior is normal.         Lab Results   Component Value Date    WBC 4.26 07/03/2018    HGB 12.2 (L) 07/03/2018    HCT 37.3 (L) 07/03/2018     07/03/2018    CHOL 261 (H) 07/03/2018    TRIG 179 (H) 07/03/2018    HDL 50 07/03/2018    ALT 25 07/03/2018    AST 35 07/03/2018     07/23/2018    K 3.3 (L) 07/23/2018     07/23/2018    CREATININE 1.6 (H) 07/23/2018    BUN 25 (H) 07/23/2018    CO2 26 07/23/2018    TSH 0.305 (L) 07/03/2018    PSA 0.98 03/19/2010    INR 1.0 04/15/2011    HGBA1C 6.1 (H) 07/03/2018       The 10-year ASCVD risk score ([place] LISETH Jr., et al., 2013) is: 12%    Values used to calculate the score:      Age: 63 years      Sex: Male      Is Non- : Yes      Diabetic: No      Tobacco smoker: No      Systolic Blood Pressure: 104 mmHg      Is BP treated: Yes      HDL Cholesterol: 50 mg/dL      Total Cholesterol: 261 mg/dL    (Imaging have been independently reviewed)  X-ray with improved alignment of 1st distal phalanx fracture    Assessment:       1. Essential hypertension    2. Pre-diabetes    3. Hypokalemia    4. Mixed hyperlipidemia    5. Severe obesity (BMI 35.0-39.9) with comorbidity    6. Postcholecystectomy diarrhea    7. Crush injury to finger, subsequent encounter    8. Normocytic anemia    9. Nutritional anemia     10. Colon cancer screening          Plan:       Isreal was seen today for hypertension.    Diagnoses and all orders for this visit:    Essential hypertension  Comments:  At goal, but valsartan-HCTZ on med recall list. Cont Norvasc 10. Switch to losartan 100, HCTZ 25.  Nurse visit 1 wk for BP check. Discussed side effects  Orders:  -     losartan (COZAAR) 100 MG tablet; Take 1 tablet (100 mg total) by mouth once daily.  -     hydroCHLOROthiazide (HYDRODIURIL) 25 MG tablet;  Take 1 tablet (25 mg total) by mouth once daily.    Pre-diabetes  Comments:  A1c 6.1.  Discussed avoiding soft drinks, white bread. Discussed adding regular exercise    Hypokalemia  Comments:  On HCTZ.  Also chronic diarrhea status post cholecystectomy.  Rescheduling BMP    Mixed hyperlipidemia  Comments:  ASCVD 12%.  Discussed diet and exercise.  Advised to restart Lipitor 80 and aspirin 81    Severe obesity (BMI 35.0-39.9) with comorbidity  Comments:  Discussed diet and exercise as above    Postcholecystectomy diarrhea  Comments:  Continue cholestyramine p.r.n.    Crush injury to finger, subsequent encounter  Comments:  Sees OT for decreased ROM    Normocytic anemia  Comments:  Denies hematochezia, melena.  Last C scope at age 48.  Iron panel.  Repeat C scope  Orders:  -     Ferritin; Future  -     Iron and TIBC; Future  -     Vitamin B12; Future    Nutritional anemia   -     Vitamin B12; Future    Colon cancer screening  Comments:  Has normocytic anemia.  Last C scope was at age 48  Orders:  -     Ambulatory referral to Gastroenterology         Side effects of medication(s) were discussed in detail and patient voiced understanding.  Patient will call back for any issues or complications.     RTC in 3 month(s) or sooner PRN for hypertension.  Nurse visit for BP check in 1-2 weeks.

## 2019-03-01 ENCOUNTER — OFFICE VISIT (OUTPATIENT)
Dept: INTERNAL MEDICINE | Facility: CLINIC | Age: 64
End: 2019-03-01
Payer: MEDICARE

## 2019-03-01 VITALS
HEIGHT: 66 IN | HEART RATE: 52 BPM | BODY MASS INDEX: 37.06 KG/M2 | SYSTOLIC BLOOD PRESSURE: 104 MMHG | OXYGEN SATURATION: 98 % | WEIGHT: 230.63 LBS | DIASTOLIC BLOOD PRESSURE: 62 MMHG

## 2019-03-01 DIAGNOSIS — E87.6 HYPOKALEMIA: ICD-10-CM

## 2019-03-01 DIAGNOSIS — Z12.11 COLON CANCER SCREENING: ICD-10-CM

## 2019-03-01 DIAGNOSIS — D53.9 NUTRITIONAL ANEMIA: ICD-10-CM

## 2019-03-01 DIAGNOSIS — E78.2 MIXED HYPERLIPIDEMIA: ICD-10-CM

## 2019-03-01 DIAGNOSIS — E66.01 SEVERE OBESITY (BMI 35.0-39.9) WITH COMORBIDITY: ICD-10-CM

## 2019-03-01 DIAGNOSIS — R73.03 PRE-DIABETES: ICD-10-CM

## 2019-03-01 DIAGNOSIS — K91.89 POSTCHOLECYSTECTOMY DIARRHEA: ICD-10-CM

## 2019-03-01 DIAGNOSIS — D64.9 NORMOCYTIC ANEMIA: ICD-10-CM

## 2019-03-01 DIAGNOSIS — R19.7 POSTCHOLECYSTECTOMY DIARRHEA: ICD-10-CM

## 2019-03-01 DIAGNOSIS — S67.10XD CRUSH INJURY TO FINGER, SUBSEQUENT ENCOUNTER: ICD-10-CM

## 2019-03-01 DIAGNOSIS — I10 ESSENTIAL HYPERTENSION: Primary | ICD-10-CM

## 2019-03-01 PROCEDURE — 99999 PR PBB SHADOW E&M-EST. PATIENT-LVL IV: CPT | Mod: PBBFAC,,, | Performed by: INTERNAL MEDICINE

## 2019-03-01 PROCEDURE — 3078F DIAST BP <80 MM HG: CPT | Mod: CPTII,S$GLB,, | Performed by: INTERNAL MEDICINE

## 2019-03-01 PROCEDURE — 99999 PR PBB SHADOW E&M-EST. PATIENT-LVL IV: ICD-10-PCS | Mod: PBBFAC,,, | Performed by: INTERNAL MEDICINE

## 2019-03-01 PROCEDURE — 3074F SYST BP LT 130 MM HG: CPT | Mod: CPTII,S$GLB,, | Performed by: INTERNAL MEDICINE

## 2019-03-01 PROCEDURE — 3074F PR MOST RECENT SYSTOLIC BLOOD PRESSURE < 130 MM HG: ICD-10-PCS | Mod: CPTII,S$GLB,, | Performed by: INTERNAL MEDICINE

## 2019-03-01 PROCEDURE — 1101F PR PT FALLS ASSESS DOC 0-1 FALLS W/OUT INJ PAST YR: ICD-10-PCS | Mod: CPTII,S$GLB,, | Performed by: INTERNAL MEDICINE

## 2019-03-01 PROCEDURE — 3078F PR MOST RECENT DIASTOLIC BLOOD PRESSURE < 80 MM HG: ICD-10-PCS | Mod: CPTII,S$GLB,, | Performed by: INTERNAL MEDICINE

## 2019-03-01 PROCEDURE — 1101F PT FALLS ASSESS-DOCD LE1/YR: CPT | Mod: CPTII,S$GLB,, | Performed by: INTERNAL MEDICINE

## 2019-03-01 PROCEDURE — 99499 UNLISTED E&M SERVICE: CPT | Mod: S$GLB,,, | Performed by: INTERNAL MEDICINE

## 2019-03-01 PROCEDURE — 3008F BODY MASS INDEX DOCD: CPT | Mod: CPTII,S$GLB,, | Performed by: INTERNAL MEDICINE

## 2019-03-01 PROCEDURE — 3008F PR BODY MASS INDEX (BMI) DOCUMENTED: ICD-10-PCS | Mod: CPTII,S$GLB,, | Performed by: INTERNAL MEDICINE

## 2019-03-01 PROCEDURE — 99499 RISK ADDL DX/OHS AUDIT: ICD-10-PCS | Mod: S$GLB,,, | Performed by: INTERNAL MEDICINE

## 2019-03-01 PROCEDURE — 99215 PR OFFICE/OUTPT VISIT, EST, LEVL V, 40-54 MIN: ICD-10-PCS | Mod: S$GLB,,, | Performed by: INTERNAL MEDICINE

## 2019-03-01 PROCEDURE — 99215 OFFICE O/P EST HI 40 MIN: CPT | Mod: S$GLB,,, | Performed by: INTERNAL MEDICINE

## 2019-03-01 RX ORDER — OMEGA-3 FATTY ACIDS 1000 MG
2 CAPSULE ORAL
COMMUNITY

## 2019-03-01 RX ORDER — LOSARTAN POTASSIUM 100 MG/1
100 TABLET ORAL DAILY
Qty: 90 TABLET | Refills: 0 | Status: SHIPPED | OUTPATIENT
Start: 2019-03-01 | End: 2019-06-06 | Stop reason: SDUPTHER

## 2019-03-01 RX ORDER — HYDROCHLOROTHIAZIDE 25 MG/1
25 TABLET ORAL DAILY
Qty: 90 TABLET | Refills: 0 | Status: SHIPPED | OUTPATIENT
Start: 2019-03-01 | End: 2019-06-06 | Stop reason: SDUPTHER

## 2019-03-01 RX ORDER — LOSARTAN POTASSIUM AND HYDROCHLOROTHIAZIDE 12.5; 1 MG/1; MG/1
1 TABLET ORAL
COMMUNITY
End: 2019-03-01

## 2019-03-01 RX ORDER — NISOLDIPINE 30 MG/1
30 TABLET, FILM COATED, EXTENDED RELEASE ORAL
COMMUNITY
End: 2019-03-01

## 2019-03-01 NOTE — PATIENT INSTRUCTIONS
I would encourage you to try to exercise for about 30 minutes a day, 5 days a week; it can even be as simple as brisk walking.  In addition, try to eat a low fat diet by avoiding fried food, butter, red meat, cheese and saturated fat.  Try to limit sugar, sweets and refined grains, which are found in white bread, white rice, most forms of pasta and packaged snack foods.  You can also try to eat more fiber through fruits and vegetables, oats, beans, nuts, and fish.

## 2019-03-15 ENCOUNTER — TELEPHONE (OUTPATIENT)
Dept: ADMINISTRATIVE | Facility: HOSPITAL | Age: 64
End: 2019-03-15

## 2019-03-15 NOTE — TELEPHONE ENCOUNTER
PATIENT CONTACTED 3X BY [facility] TO SCHEDULE COLONOSCOPY WITH NO SUCCESS    [facility] CORRESPONDENCE SCANNED INTO PATIENT CHART CAN BE VIEWED UNDER MEDIA TAB.

## 2019-06-06 DIAGNOSIS — I10 ESSENTIAL HYPERTENSION: ICD-10-CM

## 2019-06-06 RX ORDER — HYDROCHLOROTHIAZIDE 25 MG/1
TABLET ORAL
Qty: 90 TABLET | Refills: 0 | Status: SHIPPED | OUTPATIENT
Start: 2019-06-06 | End: 2019-11-18 | Stop reason: ALTCHOICE

## 2019-06-06 RX ORDER — LOSARTAN POTASSIUM 100 MG/1
TABLET ORAL
Qty: 90 TABLET | Refills: 0 | Status: SHIPPED | OUTPATIENT
Start: 2019-06-06 | End: 2019-11-18 | Stop reason: ALTCHOICE

## 2019-06-08 ENCOUNTER — PATIENT MESSAGE (OUTPATIENT)
Dept: ENDOSCOPY | Facility: HOSPITAL | Age: 64
End: 2019-06-08

## 2019-06-25 ENCOUNTER — PATIENT MESSAGE (OUTPATIENT)
Dept: ADMINISTRATIVE | Facility: HOSPITAL | Age: 64
End: 2019-06-25

## 2019-08-20 DIAGNOSIS — K91.89 POSTCHOLECYSTECTOMY DIARRHEA: ICD-10-CM

## 2019-08-20 DIAGNOSIS — R19.7 POSTCHOLECYSTECTOMY DIARRHEA: ICD-10-CM

## 2019-08-20 DIAGNOSIS — R11.0 NAUSEA: ICD-10-CM

## 2019-08-20 RX ORDER — CHOLESTYRAMINE 4 G/9G
POWDER, FOR SUSPENSION ORAL
Qty: 60 PACKET | Refills: 5 | Status: SHIPPED | OUTPATIENT
Start: 2019-08-20 | End: 2020-12-28 | Stop reason: SDUPTHER

## 2019-11-18 ENCOUNTER — IMMUNIZATION (OUTPATIENT)
Dept: PHARMACY | Facility: CLINIC | Age: 64
End: 2019-11-18
Payer: MEDICARE

## 2019-11-18 ENCOUNTER — OFFICE VISIT (OUTPATIENT)
Dept: INTERNAL MEDICINE | Facility: CLINIC | Age: 64
End: 2019-11-18
Payer: MEDICARE

## 2019-11-18 ENCOUNTER — HOSPITAL ENCOUNTER (OUTPATIENT)
Dept: RADIOLOGY | Facility: HOSPITAL | Age: 64
Discharge: HOME | End: 2019-11-18
Attending: INTERNAL MEDICINE
Payer: MEDICARE

## 2019-11-18 ENCOUNTER — IMMUNIZATION (OUTPATIENT)
Dept: PHARMACY | Facility: CLINIC | Age: 64
End: 2019-11-18

## 2019-11-18 VITALS
WEIGHT: 197.31 LBS | OXYGEN SATURATION: 98 % | HEIGHT: 67 IN | BODY MASS INDEX: 30.97 KG/M2 | SYSTOLIC BLOOD PRESSURE: 124 MMHG | HEART RATE: 48 BPM | DIASTOLIC BLOOD PRESSURE: 72 MMHG

## 2019-11-18 DIAGNOSIS — G47.00 INSOMNIA, UNSPECIFIED TYPE: ICD-10-CM

## 2019-11-18 DIAGNOSIS — Z12.11 COLON CANCER SCREENING: ICD-10-CM

## 2019-11-18 DIAGNOSIS — M54.50 CHRONIC LOW BACK PAIN WITHOUT SCIATICA, UNSPECIFIED BACK PAIN LATERALITY: ICD-10-CM

## 2019-11-18 DIAGNOSIS — G89.29 CHRONIC LOW BACK PAIN WITHOUT SCIATICA, UNSPECIFIED BACK PAIN LATERALITY: ICD-10-CM

## 2019-11-18 DIAGNOSIS — I10 HYPERTENSION, UNSPECIFIED TYPE: Primary | ICD-10-CM

## 2019-11-18 DIAGNOSIS — N52.9 ERECTILE DYSFUNCTION, UNSPECIFIED ERECTILE DYSFUNCTION TYPE: ICD-10-CM

## 2019-11-18 PROCEDURE — 3078F PR MOST RECENT DIASTOLIC BLOOD PRESSURE < 80 MM HG: ICD-10-PCS | Mod: CPTII,S$GLB,, | Performed by: INTERNAL MEDICINE

## 2019-11-18 PROCEDURE — 99999 PR PBB SHADOW E&M-EST. PATIENT-LVL IV: ICD-10-PCS | Mod: PBBFAC,,, | Performed by: INTERNAL MEDICINE

## 2019-11-18 PROCEDURE — 3008F PR BODY MASS INDEX (BMI) DOCUMENTED: ICD-10-PCS | Mod: CPTII,S$GLB,, | Performed by: INTERNAL MEDICINE

## 2019-11-18 PROCEDURE — 99215 OFFICE O/P EST HI 40 MIN: CPT | Mod: S$GLB,,, | Performed by: INTERNAL MEDICINE

## 2019-11-18 PROCEDURE — 3008F BODY MASS INDEX DOCD: CPT | Mod: CPTII,S$GLB,, | Performed by: INTERNAL MEDICINE

## 2019-11-18 PROCEDURE — 99499 RISK ADDL DX/OHS AUDIT: ICD-10-PCS | Mod: S$GLB,,, | Performed by: INTERNAL MEDICINE

## 2019-11-18 PROCEDURE — 99215 PR OFFICE/OUTPT VISIT, EST, LEVL V, 40-54 MIN: ICD-10-PCS | Mod: S$GLB,,, | Performed by: INTERNAL MEDICINE

## 2019-11-18 PROCEDURE — 72110 X-RAY EXAM L-2 SPINE 4/>VWS: CPT | Mod: 26,,, | Performed by: RADIOLOGY

## 2019-11-18 PROCEDURE — 72110 XR LUMBAR SPINE COMPLETE 5 VIEW: ICD-10-PCS | Mod: 26,,, | Performed by: RADIOLOGY

## 2019-11-18 PROCEDURE — 99999 PR PBB SHADOW E&M-EST. PATIENT-LVL IV: CPT | Mod: PBBFAC,,, | Performed by: INTERNAL MEDICINE

## 2019-11-18 PROCEDURE — 99499 UNLISTED E&M SERVICE: CPT | Mod: S$GLB,,, | Performed by: INTERNAL MEDICINE

## 2019-11-18 PROCEDURE — 1101F PR PT FALLS ASSESS DOC 0-1 FALLS W/OUT INJ PAST YR: ICD-10-PCS | Mod: CPTII,S$GLB,, | Performed by: INTERNAL MEDICINE

## 2019-11-18 PROCEDURE — 3078F DIAST BP <80 MM HG: CPT | Mod: CPTII,S$GLB,, | Performed by: INTERNAL MEDICINE

## 2019-11-18 PROCEDURE — 3074F PR MOST RECENT SYSTOLIC BLOOD PRESSURE < 130 MM HG: ICD-10-PCS | Mod: CPTII,S$GLB,, | Performed by: INTERNAL MEDICINE

## 2019-11-18 PROCEDURE — 1101F PT FALLS ASSESS-DOCD LE1/YR: CPT | Mod: CPTII,S$GLB,, | Performed by: INTERNAL MEDICINE

## 2019-11-18 PROCEDURE — 72110 X-RAY EXAM L-2 SPINE 4/>VWS: CPT | Mod: TC

## 2019-11-18 PROCEDURE — 3074F SYST BP LT 130 MM HG: CPT | Mod: CPTII,S$GLB,, | Performed by: INTERNAL MEDICINE

## 2019-11-18 NOTE — PROGRESS NOTES
"    CHIEF COMPLAINT     Chief Complaint   Patient presents with    [facility]       HPI     Isreal Randolph is a 63 y.o. male here today for HTN  Of not patient has lost approximately 100lbs over past couple of years. Actively trying to lose weight. Has significantly changed diet and activity level.  Patient with wife who contributes to HPI regarding his NSAID overuse.    HTN  Has been taking 1/2 doses of both meds for the past several months. Reports BP <140/90 almost all of the time. Reports an occasional elevation in BP first thing in the AM. He is watching salt intake and has lost weight. Of note may still have KHARI     Insomnia.   Reports issues staying asleep. Reports he goes to bed around 830-9pm.  Wakes up at 2-230 in the morning. Reports sx occur nightly.    Takes benadryl at night. Also drinks 2 beers most nights. Also has Sleep Apnea that he quit wearing mask after losing weight    Low Back pain  Reports years of low back pain with some radiculopathic pain down left leg. Sx are worst first thing in the morning. Uses heating pad/ hot soak and NSAIDs. Has been taking 1600mg of ibuprofen in the Am and 800 HS.    Reports having daily symptoms. No changes in pain with position changes. Patient report improvement in sx with ibuprofen.  Denies: foot drop, weakness, incontinence.  Denies previous imaging, injections, treatment, nor specialist evaluation.        Personally Reviewed Patient's Medical, surgical, family and social hx. Changes updated in [facility].  Care Team updated in Epic    Review of Systems:  Review of Systems   Constitutional: Negative for unexpected weight change.   Musculoskeletal: Positive for back pain.   otherwise negative    Health Maintenance:   Reviewed with patient  Due for the following:  C-scope  Prevnar  shingrix      PHYSICAL EXAM     /72 (BP Location: Left arm, Patient Position: Sitting, BP Method: Large (Manual))   Pulse (!) 48   Ht 5' 6.5" (1.689 m)   Wt 89.5 kg (197 lb 5 " oz)   SpO2 98%   BMI 31.37 kg/m²     Gen: Well Appearing, NAD  HEENT: PERR, EOMI  Neck: FROM, no thyromegaly, no cervical adenopathy  CVD: RRR, no M/R/G  Pulm: Normal work of breathing, CTAB, no wheezing  Abd:  Soft, NT, ND non TTP, no mass  MSK: no LE edema, TTP lower Lumbar spine and BL SI Joint. -SLR BL. Normal reflexes, strength intact, no foot drop  Neuro: A&Ox3, gait normal, speech normal  Mood; Mood normal, behavior normal, thought process linear       LABS     Labs reviewed;   Lab Results   Component Value Date    CREATININE 1.6 (H) 07/23/2018    BUN 25 (H) 07/23/2018     07/23/2018    K 3.3 (L) 07/23/2018     07/23/2018    CO2 26 07/23/2018         ASSESSMENT     1. Hypertension, unspecified type     2. Chronic low back pain without sciatica, unspecified back pain laterality  Ambulatory consult to [facility]    X-Ray Lumbar Spine Complete 5 View   3. Colon cancer screening  Case request GI: COLONOSCOPY   4. Erectile dysfunction, unspecified erectile dysfunction type             Plan     Isreal Randolph is a 63 y.o. male with  1. Hypertension, unspecified type  Will modify regimen since he isn't currently taking as prescribed.  -will restart amlodipine 10mg daily  -stop hctzd  Encourage continue low salt diet and check BP 2-3x week at home.     2. Chronic low back pain without sciatica, unspecified back pain laterality  Appears more consistent with mechanical back pain that true sciatica pain on exam today.  Will recommend tylenol 1000mg TID PRN (maximum 3000mg daily or 1000mg at any single dose)  Stop NSAID use.   Continue to use heating pad since he gets relief   Will get imaging since he has been having issue for years  Will refer to healthy back to help with remobilization.  - Ambulatory consult to [facility] [facility]  - X-Ray Lumbar Spine Complete 5 View; Future    3. Colon cancer screening  Overdue for age appropriate screening  - Case request GI: COLONOSCOPY    4. Erectile  dysfunction, unspecified erectile dysfunction type  Will check labs and consider trial of sildenafil if labs normal.    5. Insomnia  Suspect multifactorial. Recommend stopping benadryl, taking 2 week etoh holiday  Given information regarding sleep hygiene and CBT-I  nikolai.  If sx not improving my need to be reevaluated for sleep study.    RTC 3 months  For BP check, lab follow up and back pain    Sylvia Ambrocio MD

## 2019-12-10 ENCOUNTER — PATIENT OUTREACH (OUTPATIENT)
Dept: ADMINISTRATIVE | Facility: HOSPITAL | Age: 64
End: 2019-12-10

## 2019-12-10 DIAGNOSIS — Z12.11 COLON CANCER SCREENING: Primary | ICD-10-CM

## 2020-01-06 ENCOUNTER — HOSPITAL ENCOUNTER (EMERGENCY)
Facility: HOSPITAL | Age: 65
Discharge: HOME | End: 2020-01-06
Attending: EMERGENCY MEDICINE
Payer: MEDICARE

## 2020-01-06 VITALS
HEART RATE: 51 BPM | SYSTOLIC BLOOD PRESSURE: 189 MMHG | OXYGEN SATURATION: 99 % | BODY MASS INDEX: 31.71 KG/M2 | RESPIRATION RATE: 18 BRPM | TEMPERATURE: 98 F | DIASTOLIC BLOOD PRESSURE: 107 MMHG | HEIGHT: 66 IN | WEIGHT: 197.31 LBS

## 2020-01-06 DIAGNOSIS — I10 HTN (HYPERTENSION): ICD-10-CM

## 2020-01-06 DIAGNOSIS — I10 ESSENTIAL HYPERTENSION: ICD-10-CM

## 2020-01-06 DIAGNOSIS — I10 HYPERTENSION, UNSPECIFIED TYPE: Primary | ICD-10-CM

## 2020-01-06 LAB
BUN SERPL-MCNC: 16 MG/DL (ref 6–30)
CHLORIDE SERPL-SCNC: 104 MMOL/L (ref 95–110)
CREAT SERPL-MCNC: 1 MG/DL (ref 0.5–1.4)
GLUCOSE SERPL-MCNC: 102 MG/DL (ref 70–110)
HCT VFR BLD CALC: 37 %PCV (ref 36–54)
POC IONIZED CALCIUM: 1.25 MMOL/L (ref 1.06–1.42)
POC TCO2 (MEASURED): 26 MMOL/L (ref 23–29)
POTASSIUM BLD-SCNC: 3.2 MMOL/L (ref 3.5–5.1)
SAMPLE: ABNORMAL
SODIUM BLD-SCNC: 139 MMOL/L (ref 136–145)

## 2020-01-06 PROCEDURE — 93010 ELECTROCARDIOGRAM REPORT: CPT | Mod: ,,, | Performed by: INTERNAL MEDICINE

## 2020-01-06 PROCEDURE — 93005 ELECTROCARDIOGRAM TRACING: CPT

## 2020-01-06 PROCEDURE — 25000003 PHARM REV CODE 250: Performed by: PHYSICIAN ASSISTANT

## 2020-01-06 PROCEDURE — 99284 PR EMERGENCY DEPT VISIT,LEVEL IV: ICD-10-PCS | Mod: ,,, | Performed by: PHYSICIAN ASSISTANT

## 2020-01-06 PROCEDURE — 99284 EMERGENCY DEPT VISIT MOD MDM: CPT | Mod: 25

## 2020-01-06 PROCEDURE — 93010 EKG 12-LEAD: ICD-10-PCS | Mod: ,,, | Performed by: INTERNAL MEDICINE

## 2020-01-06 PROCEDURE — 99284 EMERGENCY DEPT VISIT MOD MDM: CPT | Mod: ,,, | Performed by: PHYSICIAN ASSISTANT

## 2020-01-06 RX ORDER — AMLODIPINE BESYLATE 10 MG/1
10 TABLET ORAL DAILY
Qty: 14 TABLET | Refills: 0 | Status: SHIPPED | OUTPATIENT
Start: 2020-01-06 | End: 2020-01-24 | Stop reason: SDUPTHER

## 2020-01-06 RX ORDER — AMLODIPINE BESYLATE 10 MG/1
10 TABLET ORAL
Status: COMPLETED | OUTPATIENT
Start: 2020-01-06 | End: 2020-01-06

## 2020-01-06 RX ORDER — HYDROCHLOROTHIAZIDE 25 MG/1
25 TABLET ORAL DAILY
Qty: 14 TABLET | Refills: 0 | Status: SHIPPED | OUTPATIENT
Start: 2020-01-06 | End: 2020-01-24 | Stop reason: ALTCHOICE

## 2020-01-06 RX ORDER — HYDROCHLOROTHIAZIDE 25 MG/1
25 TABLET ORAL
Status: COMPLETED | OUTPATIENT
Start: 2020-01-06 | End: 2020-01-06

## 2020-01-06 RX ORDER — ACETAMINOPHEN 500 MG
1000 TABLET ORAL
Status: COMPLETED | OUTPATIENT
Start: 2020-01-06 | End: 2020-01-06

## 2020-01-06 RX ORDER — LOSARTAN POTASSIUM 100 MG/1
100 TABLET ORAL DAILY
Qty: 14 TABLET | Refills: 0 | Status: SHIPPED | OUTPATIENT
Start: 2020-01-06 | End: 2020-01-24 | Stop reason: SDUPTHER

## 2020-01-06 RX ADMIN — AMLODIPINE BESYLATE 10 MG: 10 TABLET ORAL at 01:01

## 2020-01-06 RX ADMIN — POTASSIUM BICARBONATE 50 MEQ: 978 TABLET, EFFERVESCENT ORAL at 02:01

## 2020-01-06 RX ADMIN — ACETAMINOPHEN 1000 MG: 500 TABLET ORAL at 01:01

## 2020-01-06 RX ADMIN — HYDROCHLOROTHIAZIDE 25 MG: 25 TABLET ORAL at 01:01

## 2020-01-06 NOTE — ED PROVIDER NOTES
Encounter Date: 1/6/2020       History     Chief Complaint   Patient presents with    Hypertension     Pt reports /90 at home and reports out of BP medications. Pt last took BP medication yesterday. C/o headache, blurry vision.      Patient is a 64 year old male with PMHX of HTN, HLD, and GERD. He presents to the ED for headache. He reports having a posterior headache onset today. Describes pain as constant and throbbing. Rates pain 5/10. Hx of similar headache in past. Reports associated blurred vision. Reports being without blood pressure medications for approximately two days. Denies hx of anticoagulation. He denies slurred speech, paresthesias, gait abnormalities, or motor weakness. He denies fever,chills, nausea, vomiting, sob, chest pain, abd pain, dysuria, diarrhea, or constipation. He is a non smoker and reports alcohol use.    The history is provided by the patient and medical records. No  was used.     Review of patient's allergies indicates:   Allergen Reactions    Lactose intolerance  [lactase]      Other reaction(s): Diarrhea    Penicillins Other (See Comments)     Swelling of lymph nodes     Past Medical History:   Diagnosis Date    Allergy     Back pain     Hypertension     Mixed hyperlipidemia 7/3/2018    Obesity     Reflux     Sleep apnea 6/21/2013     Past Surgical History:   Procedure Laterality Date    CHOLECYSTECTOMY  2012    HERNIA REPAIR      RHINOPLASTY       Family History   Problem Relation Age of Onset    Cancer Mother 65        exposure related    Heart disease Father     Colon cancer Neg Hx     Esophageal cancer Neg Hx     Diabetes Neg Hx      Social History     Tobacco Use    Smoking status: Never Smoker    Smokeless tobacco: Never Used   Substance Use Topics    Alcohol use: Yes     Frequency: 4 or more times a week     Drinks per session: 1 or 2     Comment: 3 times per week    Drug use: No     Review of Systems   Constitutional: Negative  for fever.   HENT: Negative for sore throat.    Eyes: Positive for visual disturbance.   Respiratory: Negative for shortness of breath.    Cardiovascular: Negative for chest pain.   Gastrointestinal: Negative for abdominal pain, nausea and vomiting.   Genitourinary: Negative for dysuria.   Musculoskeletal: Negative for back pain.   Skin: Negative for rash.   Neurological: Positive for headaches. Negative for weakness.   Hematological: Does not bruise/bleed easily.       Physical Exam     Initial Vitals [01/06/20 0103]   BP Pulse Resp Temp SpO2   (!) 204/99 (!) 48 14 97.5 °F (36.4 °C) (!) 94 %      MAP       --         Physical Exam    Vitals reviewed.  Constitutional: He appears well-developed and well-nourished. No distress.   HENT:   Head: Normocephalic.   Eyes: Conjunctivae and EOM are normal. Pupils are equal, round, and reactive to light.   Neck: Normal range of motion.   Cardiovascular: Normal rate and regular rhythm.   No murmur heard.  Pulmonary/Chest: Breath sounds normal. No respiratory distress. He has no wheezes. He has no rales.   Abdominal: Soft. Bowel sounds are normal. He exhibits no distension. There is no tenderness.   Musculoskeletal: Normal range of motion.   Neurological: He is alert and oriented to person, place, and time. He has normal strength. No sensory deficit. Coordination and gait normal.   Motor strength of b/l UE and LE 5/5. Finger to nose negative. Heel to shin negative. Pronator drift negative. No facial droop or asymmetry. Speech is clear. Tongue protrudes midline with no fasciculations.   Skin: Skin is warm and dry. No erythema.         ED Course   Procedures  Labs Reviewed   ISTAT PROCEDURE - Abnormal; Notable for the following components:       Result Value    POC Potassium 3.2 (*)     All other components within normal limits             Medical Decision Making:   History:   Old Medical Records: I decided to obtain old medical records.  Clinical Tests:   Lab Tests: Ordered and  Reviewed  Medical Tests: Ordered and Reviewed       APC / Resident Notes:   Patient is a 64 year old male presents to the ED for emergent evaluation of headache.     Will order istat and oral antihypertensive for symptomatic relief. Will continue to monitor.     Differential diagnoses include, but are not limited to: hypertensive urgency/emergency, atypical migraine, cluster headache, cardiac arrhyhtmia, or electrolyte imbalance.     istat WNL. Hypokalemia 3.2 repleted PO during ED visit.     Patient reassessed, reports symptoms resolved with ED management. I have discussed emergency department findings, and plan with the patient. Will discharge home with amlodipine, losartan, and HCTZ and F/U with PCP. Patient verbalizes understanding of plan and agrees. Return precautions given.     I have discussed and reviewed with my supervising physician.          Clinical Impression:       ICD-10-CM ICD-9-CM   1. Hypertension, unspecified type I10 401.9         Disposition:   Disposition: Discharged  Condition: Stable                     Tess Barclay PA-C  01/06/20 0658

## 2020-01-06 NOTE — ED TRIAGE NOTES
64 year old male presents to ED reporting HTN after running out of bp medications yesterday. Pt reports 5/10 headache and blurred vision since yesterday. Denies nvd. aaxo3

## 2020-01-24 ENCOUNTER — OFFICE VISIT (OUTPATIENT)
Dept: INTERNAL MEDICINE | Facility: CLINIC | Age: 65
End: 2020-01-24
Payer: MEDICARE

## 2020-01-24 VITALS
WEIGHT: 204.56 LBS | HEIGHT: 66 IN | OXYGEN SATURATION: 94 % | DIASTOLIC BLOOD PRESSURE: 72 MMHG | BODY MASS INDEX: 32.88 KG/M2 | HEART RATE: 51 BPM | SYSTOLIC BLOOD PRESSURE: 104 MMHG

## 2020-01-24 DIAGNOSIS — I10 HYPERTENSION, UNSPECIFIED TYPE: ICD-10-CM

## 2020-01-24 PROCEDURE — 1159F MED LIST DOCD IN RCRD: CPT | Mod: S$GLB,,, | Performed by: INTERNAL MEDICINE

## 2020-01-24 PROCEDURE — 1126F AMNT PAIN NOTED NONE PRSNT: CPT | Mod: S$GLB,,, | Performed by: INTERNAL MEDICINE

## 2020-01-24 PROCEDURE — 1159F PR MEDICATION LIST DOCUMENTED IN MEDICAL RECORD: ICD-10-PCS | Mod: S$GLB,,, | Performed by: INTERNAL MEDICINE

## 2020-01-24 PROCEDURE — 99213 PR OFFICE/OUTPT VISIT, EST, LEVL III, 20-29 MIN: ICD-10-PCS | Mod: S$GLB,,, | Performed by: INTERNAL MEDICINE

## 2020-01-24 PROCEDURE — 99999 PR PBB SHADOW E&M-EST. PATIENT-LVL III: CPT | Mod: PBBFAC,,, | Performed by: INTERNAL MEDICINE

## 2020-01-24 PROCEDURE — 1126F PR PAIN SEVERITY QUANTIFIED, NO PAIN PRESENT: ICD-10-PCS | Mod: S$GLB,,, | Performed by: INTERNAL MEDICINE

## 2020-01-24 PROCEDURE — 3078F PR MOST RECENT DIASTOLIC BLOOD PRESSURE < 80 MM HG: ICD-10-PCS | Mod: CPTII,S$GLB,, | Performed by: INTERNAL MEDICINE

## 2020-01-24 PROCEDURE — 99213 OFFICE O/P EST LOW 20 MIN: CPT | Mod: S$GLB,,, | Performed by: INTERNAL MEDICINE

## 2020-01-24 PROCEDURE — 99499 RISK ADDL DX/OHS AUDIT: ICD-10-PCS | Mod: S$GLB,,, | Performed by: INTERNAL MEDICINE

## 2020-01-24 PROCEDURE — 3074F SYST BP LT 130 MM HG: CPT | Mod: CPTII,S$GLB,, | Performed by: INTERNAL MEDICINE

## 2020-01-24 PROCEDURE — 3078F DIAST BP <80 MM HG: CPT | Mod: CPTII,S$GLB,, | Performed by: INTERNAL MEDICINE

## 2020-01-24 PROCEDURE — 1101F PR PT FALLS ASSESS DOC 0-1 FALLS W/OUT INJ PAST YR: ICD-10-PCS | Mod: CPTII,S$GLB,, | Performed by: INTERNAL MEDICINE

## 2020-01-24 PROCEDURE — 99499 UNLISTED E&M SERVICE: CPT | Mod: S$GLB,,, | Performed by: INTERNAL MEDICINE

## 2020-01-24 PROCEDURE — 99999 PR PBB SHADOW E&M-EST. PATIENT-LVL III: ICD-10-PCS | Mod: PBBFAC,,, | Performed by: INTERNAL MEDICINE

## 2020-01-24 PROCEDURE — 1101F PT FALLS ASSESS-DOCD LE1/YR: CPT | Mod: CPTII,S$GLB,, | Performed by: INTERNAL MEDICINE

## 2020-01-24 PROCEDURE — 3074F PR MOST RECENT SYSTOLIC BLOOD PRESSURE < 130 MM HG: ICD-10-PCS | Mod: CPTII,S$GLB,, | Performed by: INTERNAL MEDICINE

## 2020-01-24 RX ORDER — AMLODIPINE BESYLATE 10 MG/1
10 TABLET ORAL DAILY
Qty: 90 TABLET | Refills: 3 | Status: SHIPPED | OUTPATIENT
Start: 2020-01-24 | End: 2021-03-02

## 2020-01-24 RX ORDER — LOSARTAN POTASSIUM 100 MG/1
100 TABLET ORAL DAILY
Qty: 90 TABLET | Refills: 3 | Status: SHIPPED | OUTPATIENT
Start: 2020-01-24 | End: 2020-03-20 | Stop reason: SDUPTHER

## 2020-01-24 RX ORDER — LOSARTAN POTASSIUM 100 MG/1
100 TABLET ORAL DAILY
Qty: 90 TABLET | Refills: 3 | Status: SHIPPED | OUTPATIENT
Start: 2020-01-24 | End: 2020-01-24 | Stop reason: SDUPTHER

## 2020-01-24 NOTE — PROGRESS NOTES
"    CHIEF COMPLAINT     Chief Complaint   Patient presents with    Medication Refill     Amlodipine, Losartan       HPI     Isreal Randolph is a 64 y.o. male here today for medication refill    Saw patient in November 2019.  At that time increased amlodipine to 10 mg daily and stopped hydrochlorothiazide.  Patient was taking amlodipine 10 mg and losartan 100 mg at home.  Blood pressure was at goal.  He ran out of medication a couple weeks ago.  He was seen in the ER 1 6 was refilled amlodipine 10 and hydrochlorothiazide 25 and losartan 100 mg daily.  Patient reports he has been taking daily.  Reports his blood pressure has been low since taking all 3 medications.    Personally Reviewed Patient's Medical, surgical, family and social hx. Changes updated in [facility].  Care Team updated in Epic    Review of Systems:  Review of Systems   Cardiovascular: Negative for chest pain and leg swelling.   Neurological: Negative for dizziness and light-headedness.       Health Maintenance:   Reviewed with patient  Due for the following:  Treat him fit card today    PHYSICAL EXAM     /72 (BP Location: Left arm, Patient Position: Sitting, BP Method: Large (Manual))   Pulse (!) 51   Ht 5' 6" (1.676 m)   Wt 92.8 kg (204 lb 9.4 oz)   SpO2 (!) 94%   BMI 33.02 kg/m²     Gen: Well Appearing, NAD  HEENT: PERR, EOMI  Neck: FROM, no thyromegaly, no cervical adenopathy  CVD: RRR, no M/R/G  Pulm: Normal work of breathing, CTAB, no wheezing  Abd:  Soft, NT, ND non TTP, no mass  MSK: no LE edema  Neuro: A&Ox3, gait normal, speech normal  Mood; Mood normal, behavior normal, thought process linear       LABS     Labs reviewed; Notable for    ASSESSMENT     1. Hypertension, unspecified type  amLODIPine (NORVASC) 10 MG tablet    losartan (COZAAR) 100 MG tablet    DISCONTINUED: losartan (COZAAR) 100 MG tablet           Plan     Isreal Randolph is a 64 y.o. male with  1. Hypertension, unspecified type  Establish not controlled  Discussed with " patient he should never have to go to the emergency room for medication refill.  He should be able to get contact his pharmacy who can queue for refill or he can go through the patient portal and request directly from me.  Since blood pressure is low in all 3 medications.  Will continue amlodipine 10 and losartan 100 mg daily.  Patient can check blood pressure at home 2 to 3 times a week and we can reassess control at our upcoming visit February 18  - amLODIPine (NORVASC) 10 MG tablet; Take 1 tablet (10 mg total) by mouth once daily. for 360 doses  Dispense: 90 tablet; Refill: 3  - losartan (COZAAR) 100 MG tablet; Take 1 tablet (100 mg total) by mouth once daily. for 14 days  Dispense: 90 tablet; Refill: 3      Tai Ambrocio MD

## 2020-01-27 ENCOUNTER — TELEPHONE (OUTPATIENT)
Dept: INTERNAL MEDICINE | Facility: CLINIC | Age: 65
End: 2020-01-27

## 2020-01-27 NOTE — TELEPHONE ENCOUNTER
----- Message from Tai Ambrocio MD sent at 1/27/2020  9:11 AM CST -----  Stool test negative for blood. Can repeat in 1 year as part of colorectal cancer screening.    Tai Ambrocio

## 2020-03-20 DIAGNOSIS — I10 HYPERTENSION, UNSPECIFIED TYPE: ICD-10-CM

## 2020-03-20 NOTE — TELEPHONE ENCOUNTER
----- Message from Mayra Bentley sent at 3/20/2020  3:33 PM CDT -----  Contact: Zachary Roberts/[phone]  Requesting an RX refill or new RX.  Is this a refill or new RX:  New Rx   RX name and strength: losartan (COZAAR) 100 MG tablet  Directions (copy/paste from chart):    Is this a 30 day or 90 day RX:    Local pharmacy or mail order pharmacy: Local pharmacy   Pharmacy name and phone # (copy/paste from chart): [id number] - [place] - [address] [facility] [address] [phone] (Phone)  [phone] (Fax)    Comments:

## 2020-03-27 RX ORDER — LOSARTAN POTASSIUM 100 MG/1
100 TABLET ORAL DAILY
Qty: 90 TABLET | Refills: 3 | Status: SHIPPED | OUTPATIENT
Start: 2020-03-27 | End: 2021-04-26 | Stop reason: ALTCHOICE

## 2020-03-27 NOTE — PROGRESS NOTES
Refill Authorization Note     is requesting a refill authorization.    Brief assessment and rationale for refill: APPROVE: prr          Medication Therapy Plan: PCP ordered 12 month supply with 14 day expiration - 14 tabs were dispensed per Epic claims data; Reordered 12 month supply                               Comments: K low but not likely associated with losartan use.     Refill Center Care Gap Closure protocols temporarily suspended.   Requested Prescriptions   Signed Prescriptions Disp Refills    losartan (COZAAR) 100 MG tablet 90 tablet 3     Sig: Take 1 tablet (100 mg total) by mouth once daily.       Cardiovascular:  Angiotensin Receptor Blockers Failed - 3/20/2020  4:47 PM        Failed - K in normal range and within 360 days     POC Potassium   Date Value Ref Range Status   01/06/2020 3.2 (L) 3.5 - 5.1 mmol/L Final     Potassium   Date Value Ref Range Status   07/23/2018 3.3 (L) 3.5 - 5.1 mmol/L Final   07/03/2018 3.2 (L) 3.5 - 5.1 mmol/L Final   07/16/2013 3.5 3.5 - 5.1 mmol/L Final              Failed - eGFR within 360 days     eGFR if non    Date Value Ref Range Status   07/23/2018 45 (A) >60 mL/min/1.73 m^2 Final     Comment:     Calculation used to obtain the estimated glomerular filtration  rate (eGFR) is the CKD-EPI equation.      07/03/2018 >60.0 >60 mL/min/1.73 m^2 Final     Comment:     Calculation used to obtain the estimated glomerular filtration  rate (eGFR) is the CKD-EPI equation.      07/16/2013 48 (L) >60 mL/min Final     eGFR if    Date Value Ref Range Status   07/23/2018 52 (A) >60 mL/min/1.73 m^2 Final   07/03/2018 >60.0 >60 mL/min/1.73 m^2 Final   07/16/2013 58 (L) >60 mL/min Final     Comment:     Estimated glomerular filtration rate (eGFR) is normalized to an  average body surface area of 1.73 square meters.  The calculation  used to obtain the eGFR is the adjusted MDRD equation, which factors  patient sex, age, race, and creatinine result.   Since race is unknown  in our information system, the eGFR values for -American  and Non--American patients are given for each creatinine  result.              Passed - Patient is at least 18 years old        Passed - Last BP in normal range within 360 days.     BP Readings from Last 3 Encounters:   01/24/20 104/72   01/06/20 (!) 189/107   11/18/19 124/72              Passed - Office visit in past 12 months or future 90 days.     Recent Outpatient Visits            2 months ago Hypertension, unspecified type    [facility] Tai Ambrocio MD    4 months ago Hypertension, unspecified type    [facility] Tai Ambrocio MD    1 year ago Essential hypertension    [facility] [facility] [address] Tori Gonzalez MD    1 year ago Crush injury to finger, initial encounter    [facility] [place] [address] Samir Leija MD    1 year ago Essential hypertension    [facility] [facility] [address] Tai Narayanan MD                    Passed - Cr is 1.3 or below and within 360 days     Creatinine   Date Value Ref Range Status   07/23/2018 1.6 (H) 0.5 - 1.4 mg/dL Final   07/03/2018 1.2 0.5 - 1.4 mg/dL Final   07/16/2013 1.5 (H) 0.5 - 1.4 mg/dl Final     POC Creatinine   Date Value Ref Range Status   01/06/2020 1.0 0.5 - 1.4 mg/dL Final               Appointments  past 12m or future 3m with PCP    Date Provider   Last Visit   1/24/2020 Tai Ambrocio MD   Next Visit   Visit date not found Tai Ambrocio MD   .  ED visits in past 90 days: 1       Note composed:1:06 PM 03/27/2020

## 2020-05-21 ENCOUNTER — OFFICE VISIT (OUTPATIENT)
Dept: INTERNAL MEDICINE | Facility: CLINIC | Age: 65
End: 2020-05-21
Payer: MEDICARE

## 2020-05-21 VITALS
BODY MASS INDEX: 31.71 KG/M2 | HEIGHT: 67 IN | OXYGEN SATURATION: 97 % | SYSTOLIC BLOOD PRESSURE: 132 MMHG | HEART RATE: 74 BPM | DIASTOLIC BLOOD PRESSURE: 72 MMHG | WEIGHT: 202 LBS

## 2020-05-21 DIAGNOSIS — Z20.822 CLOSE EXPOSURE TO COVID-19 VIRUS: ICD-10-CM

## 2020-05-21 DIAGNOSIS — G47.01 INSOMNIA DUE TO MEDICAL CONDITION: Primary | ICD-10-CM

## 2020-05-21 DIAGNOSIS — R29.818 SUSPECTED SLEEP APNEA: ICD-10-CM

## 2020-05-21 PROCEDURE — 1159F MED LIST DOCD IN RCRD: CPT | Mod: S$GLB,,, | Performed by: INTERNAL MEDICINE

## 2020-05-21 PROCEDURE — 99214 PR OFFICE/OUTPT VISIT, EST, LEVL IV, 30-39 MIN: ICD-10-PCS | Mod: S$GLB,,, | Performed by: INTERNAL MEDICINE

## 2020-05-21 PROCEDURE — 3078F PR MOST RECENT DIASTOLIC BLOOD PRESSURE < 80 MM HG: ICD-10-PCS | Mod: CPTII,S$GLB,, | Performed by: INTERNAL MEDICINE

## 2020-05-21 PROCEDURE — 99999 PR PBB SHADOW E&M-EST. PATIENT-LVL III: ICD-10-PCS | Mod: PBBFAC,,, | Performed by: INTERNAL MEDICINE

## 2020-05-21 PROCEDURE — 99214 OFFICE O/P EST MOD 30 MIN: CPT | Mod: S$GLB,,, | Performed by: INTERNAL MEDICINE

## 2020-05-21 PROCEDURE — 1101F PT FALLS ASSESS-DOCD LE1/YR: CPT | Mod: CPTII,S$GLB,, | Performed by: INTERNAL MEDICINE

## 2020-05-21 PROCEDURE — 1101F PR PT FALLS ASSESS DOC 0-1 FALLS W/OUT INJ PAST YR: ICD-10-PCS | Mod: CPTII,S$GLB,, | Performed by: INTERNAL MEDICINE

## 2020-05-21 PROCEDURE — 3078F DIAST BP <80 MM HG: CPT | Mod: CPTII,S$GLB,, | Performed by: INTERNAL MEDICINE

## 2020-05-21 PROCEDURE — 1126F AMNT PAIN NOTED NONE PRSNT: CPT | Mod: S$GLB,,, | Performed by: INTERNAL MEDICINE

## 2020-05-21 PROCEDURE — 3075F SYST BP GE 130 - 139MM HG: CPT | Mod: CPTII,S$GLB,, | Performed by: INTERNAL MEDICINE

## 2020-05-21 PROCEDURE — 1126F PR PAIN SEVERITY QUANTIFIED, NO PAIN PRESENT: ICD-10-PCS | Mod: S$GLB,,, | Performed by: INTERNAL MEDICINE

## 2020-05-21 PROCEDURE — 3075F PR MOST RECENT SYSTOLIC BLOOD PRESS GE 130-139MM HG: ICD-10-PCS | Mod: CPTII,S$GLB,, | Performed by: INTERNAL MEDICINE

## 2020-05-21 PROCEDURE — 99999 PR PBB SHADOW E&M-EST. PATIENT-LVL III: CPT | Mod: PBBFAC,,, | Performed by: INTERNAL MEDICINE

## 2020-05-21 PROCEDURE — 1159F PR MEDICATION LIST DOCUMENTED IN MEDICAL RECORD: ICD-10-PCS | Mod: S$GLB,,, | Performed by: INTERNAL MEDICINE

## 2020-05-21 RX ORDER — TRAZODONE HYDROCHLORIDE 50 MG/1
50 TABLET ORAL NIGHTLY
Qty: 30 TABLET | Refills: 11 | Status: SHIPPED | OUTPATIENT
Start: 2020-05-21 | End: 2021-04-26

## 2020-05-21 NOTE — PROGRESS NOTES
"    CHIEF COMPLAINT     Chief Complaint   Patient presents with    Insomnia     has difficulty sleeping, does not get full 8 hrs of sleep. sleeps for about 2-3 hours then wakes up again, then falls back to sleep. diagnosed years ago w/ [facility], then had sleep study done.     Results     stated that his wife is a nurse's aide that works for [employer], she did tested positive for COVID. patient is asymptomatic, should he be tested?        HPI     Isreal Randolph is a 64 y.o. male here today for     Insomnia  Reports issues staying asleep. Reports he goes to bed around 830-9pm. Reports no issues with SOL.  Wakes up at 2-230 in the morning. Reports sx occur 4-5 nights. Reports takes PRN benadryl for allergies at night that help with symptoms.  Also has Sleep Apnea that he quit wearing mask after losing weight.  We had this issue back in November.  Patient did not want to go through sleep study at that time.  Discussed sleep hygiene.  Patient has not noticed any improvement since that time.    Wife had covid  >1 month ago. Never had any symptoms.  She has subsequently tested negative twice.  Reports that he lived with her stay with her to care for.    Personally Reviewed Patient's Medical, surgical, family and social hx. Changes updated in [employer].  Care Team updated in Epic    Review of Systems:  Review of Systems   Constitutional: Positive for fatigue.   Psychiatric/Behavioral: Positive for sleep disturbance. Negative for decreased concentration and self-injury. The patient is not nervous/anxious.        Health Maintenance:   Reviewed with patient  Due for the following:  Due for shingles deferred today.    PHYSICAL EXAM     /72 (BP Location: Left arm, Patient Position: Sitting, BP Method: Large (Manual))   Pulse 74   Ht 5' 7" (1.702 m)   Wt 91.6 kg (202 lb)   SpO2 97%   BMI 31.64 kg/m²     Gen: Well Appearing, NAD  HEENT: PERR, EOMI  Neck: FROM, no thyromegaly, no cervical adenopathy  CVD: RRR, no M/R/G  Pulm: " Normal work of breathing, CTAB, no wheezing  Abd:  Soft, NT, ND non TTP, no mass  MSK: no LE edema  Neuro: A&Ox3, gait normal, speech normal  Mood; Mood normal, behavior normal, thought process linear       LABS     Labs reviewed; Notable for  Lab Results   Component Value Date    CREATININE 1.6 (H) 07/23/2018    BUN 25 (H) 07/23/2018     07/23/2018    K 3.3 (L) 07/23/2018     07/23/2018    CO2 26 07/23/2018       ASSESSMENT     1. Insomnia due to medical condition  traZODone (DESYREL) 50 MG tablet   2. Suspected sleep apnea  Ambulatory referral/consult to Sleep Disorders   3. Close Exposure to Covid-19 Virus             Plan     Isreal Randolph is a 64 y.o. male with  1. Insomnia due to medical condition  Suspect symptoms are secondary to untreated sleep apnea.  Will refer him to Sleep for further evaluation.  Will try some trazodone short-term for symptomatic relief while we await further workup.  - traZODone (DESYREL) 50 MG tablet; Take 1 tablet (50 mg total) by mouth every evening.  Dispense: 30 tablet; Refill: 11    2. Suspected sleep apnea  Previously diagnosed, lost weight and symptoms improved.  He has had worsening over the last few months.  Think he warrants re-evaluation at this point time.  - Ambulatory referral/consult to Sleep Disorders; Future    3. Close Exposure to Covid-19 Virus  No active symptoms today.  His exposure was over a month ago.  Think he would benefit from antibody testing.  Recommended that he talk to his insurance company to see if they would pay for antibody testing.  If so he will reach out to me and I will get testing done.  Discussed lack of information and limitations of antibody testing at this point time.      Tai Ambrocio MD

## 2020-05-26 ENCOUNTER — TELEPHONE (OUTPATIENT)
Dept: SLEEP MEDICINE | Facility: CLINIC | Age: 65
End: 2020-05-26

## 2020-07-24 ENCOUNTER — OFFICE VISIT (OUTPATIENT)
Dept: INTERNAL MEDICINE | Facility: CLINIC | Age: 65
End: 2020-07-24
Payer: MEDICARE

## 2020-07-24 ENCOUNTER — LAB VISIT (OUTPATIENT)
Dept: LAB | Facility: HOSPITAL | Age: 65
End: 2020-07-24
Attending: INTERNAL MEDICINE
Payer: MEDICARE

## 2020-07-24 VITALS
WEIGHT: 198 LBS | HEIGHT: 66 IN | DIASTOLIC BLOOD PRESSURE: 76 MMHG | SYSTOLIC BLOOD PRESSURE: 150 MMHG | OXYGEN SATURATION: 95 % | HEART RATE: 68 BPM | BODY MASS INDEX: 31.82 KG/M2

## 2020-07-24 DIAGNOSIS — R79.89 ELEVATED SERUM CREATININE: ICD-10-CM

## 2020-07-24 DIAGNOSIS — M54.40 LUMBAGO OF LUMBAR REGION WITH SCIATICA: Primary | ICD-10-CM

## 2020-07-24 LAB
ANION GAP SERPL CALC-SCNC: 7 MMOL/L (ref 8–16)
BUN SERPL-MCNC: 12 MG/DL (ref 8–23)
CALCIUM SERPL-MCNC: 9.5 MG/DL (ref 8.7–10.5)
CHLORIDE SERPL-SCNC: 108 MMOL/L (ref 95–110)
CO2 SERPL-SCNC: 26 MMOL/L (ref 23–29)
CREAT SERPL-MCNC: 1.1 MG/DL (ref 0.5–1.4)
EST. GFR  (AFRICAN AMERICAN): >60 ML/MIN/1.73 M^2
EST. GFR  (NON AFRICAN AMERICAN): >60 ML/MIN/1.73 M^2
GLUCOSE SERPL-MCNC: 93 MG/DL (ref 70–110)
POTASSIUM SERPL-SCNC: 3.4 MMOL/L (ref 3.5–5.1)
SODIUM SERPL-SCNC: 141 MMOL/L (ref 136–145)

## 2020-07-24 PROCEDURE — 99999 PR PBB SHADOW E&M-EST. PATIENT-LVL III: ICD-10-PCS | Mod: PBBFAC,,, | Performed by: INTERNAL MEDICINE

## 2020-07-24 PROCEDURE — 1125F AMNT PAIN NOTED PAIN PRSNT: CPT | Mod: S$GLB,,, | Performed by: INTERNAL MEDICINE

## 2020-07-24 PROCEDURE — 3078F DIAST BP <80 MM HG: CPT | Mod: CPTII,S$GLB,, | Performed by: INTERNAL MEDICINE

## 2020-07-24 PROCEDURE — 80048 BASIC METABOLIC PNL TOTAL CA: CPT

## 2020-07-24 PROCEDURE — 3077F PR MOST RECENT SYSTOLIC BLOOD PRESSURE >= 140 MM HG: ICD-10-PCS | Mod: CPTII,S$GLB,, | Performed by: INTERNAL MEDICINE

## 2020-07-24 PROCEDURE — 99214 PR OFFICE/OUTPT VISIT, EST, LEVL IV, 30-39 MIN: ICD-10-PCS | Mod: S$GLB,,, | Performed by: INTERNAL MEDICINE

## 2020-07-24 PROCEDURE — 36415 COLL VENOUS BLD VENIPUNCTURE: CPT

## 2020-07-24 PROCEDURE — 1125F PR PAIN SEVERITY QUANTIFIED, PAIN PRESENT: ICD-10-PCS | Mod: S$GLB,,, | Performed by: INTERNAL MEDICINE

## 2020-07-24 PROCEDURE — 99214 OFFICE O/P EST MOD 30 MIN: CPT | Mod: S$GLB,,, | Performed by: INTERNAL MEDICINE

## 2020-07-24 PROCEDURE — 3008F PR BODY MASS INDEX (BMI) DOCUMENTED: ICD-10-PCS | Mod: CPTII,S$GLB,, | Performed by: INTERNAL MEDICINE

## 2020-07-24 PROCEDURE — 1159F PR MEDICATION LIST DOCUMENTED IN MEDICAL RECORD: ICD-10-PCS | Mod: S$GLB,,, | Performed by: INTERNAL MEDICINE

## 2020-07-24 PROCEDURE — 1159F MED LIST DOCD IN RCRD: CPT | Mod: S$GLB,,, | Performed by: INTERNAL MEDICINE

## 2020-07-24 PROCEDURE — 3077F SYST BP >= 140 MM HG: CPT | Mod: CPTII,S$GLB,, | Performed by: INTERNAL MEDICINE

## 2020-07-24 PROCEDURE — 3008F BODY MASS INDEX DOCD: CPT | Mod: CPTII,S$GLB,, | Performed by: INTERNAL MEDICINE

## 2020-07-24 PROCEDURE — 1101F PR PT FALLS ASSESS DOC 0-1 FALLS W/OUT INJ PAST YR: ICD-10-PCS | Mod: CPTII,S$GLB,, | Performed by: INTERNAL MEDICINE

## 2020-07-24 PROCEDURE — 1101F PT FALLS ASSESS-DOCD LE1/YR: CPT | Mod: CPTII,S$GLB,, | Performed by: INTERNAL MEDICINE

## 2020-07-24 PROCEDURE — 3078F PR MOST RECENT DIASTOLIC BLOOD PRESSURE < 80 MM HG: ICD-10-PCS | Mod: CPTII,S$GLB,, | Performed by: INTERNAL MEDICINE

## 2020-07-24 PROCEDURE — 99999 PR PBB SHADOW E&M-EST. PATIENT-LVL III: CPT | Mod: PBBFAC,,, | Performed by: INTERNAL MEDICINE

## 2020-07-24 RX ORDER — CYCLOBENZAPRINE HCL 10 MG
10 TABLET ORAL 3 TIMES DAILY PRN
Qty: 60 TABLET | Refills: 0 | Status: SHIPPED | OUTPATIENT
Start: 2020-07-24 | End: 2020-08-03

## 2020-07-24 NOTE — PROGRESS NOTES
"    CHIEF COMPLAINT     Chief Complaint   Patient presents with    Low-back Pain     reports of severe, debilitating pain in the lower back. possible sciatica? patient has been taking 500 MG of Tylenol (taking 3-4 per day due to pain). patient says sitting in a fetal position helps much. back pain started a few weeks ago.     Urinary Frequency     denies any burning sensations during urination but does have frequent urination. patient is off of the fluid pills.       HPI     Isreal Randolph is a 64 y.o. male here today for acute on chronic back pain    Reports increased pain in his lower back for the past 2 weeks.  Reports pain is located in bilateral paraspinal muscles of lumbar spine and left glute.  Symptoms a lesser degree in the right glute.  Describes pain as tightness patient's pain is exacerbated by sitting on extension.  Patient gets little bit of relief from heat pad and stretching.  Patient has been taking at Tylenol 2000 mg multiple times per day.  With minimal relief.  Reports having similar episode like this in November but not to this degree.  Reports that it is hard for him to do much of anything.  Denies fever, incontinence, weakness, numbness.    Personally Reviewed Patient's Medical, surgical, family and social hx. Changes updated in [facility].  Care Team updated in Epic    Review of Systems:  Review of Systems   Musculoskeletal: Positive for back pain, gait problem and myalgias. Negative for neck pain and neck stiffness.   Neurological: Negative for weakness.       Health Maintenance:   Reviewed with patient  Due for the following:      PHYSICAL EXAM     BP (!) 150/76 (BP Location: Left arm, Patient Position: Sitting, BP Method: Large (Manual))   Pulse 68   Ht 5' 6" (1.676 m)   Wt 89.8 kg (198 lb)   SpO2 95%   BMI 31.96 kg/m²     Gen: Well Appearing, NAD  HEENT: PERR, EOMI  Neck: FROM, no thyromegaly, no cervical adenopathy  Chest:  Normal work of breathing  Neuro: A&Ox3, gait normal, speech " normal  Mood; Mood normal, behavior normal, thought process linear   MSK:  No tenderness over L-spine, paraspinal tenderness bilateral lumbar spine left greater than right tender palpation left glute less tenderness palpation right glute.  Pain with facet loading hyper extension.  A little bit of improvement with flexion.  Normal strength normal reflexes    LABS     Labs reviewed; Notable for    ASSESSMENT     1. Lumbago of lumbar region with sciatica  Ambulatory referral/consult to Physical/Occupational Therapy    cyclobenzaprine (FLEXERIL) 10 MG tablet   2. Elevated serum creatinine  Basic metabolic panel    Comprehensive metabolic panel    CANCELED: Comprehensive metabolic panel           Plan     Isreal Randolph is a 64 y.o. male with  1. Lumbago of lumbar region with sciatica  Acute on chronic low back pain.  Patient reports sciatica but really exam more consistent with myofascial pain.  Recommend continued use of heating pad  Tylenol 1000 mg every 8 hr as needed no more than 3000 mg total per day  Will add a muscle relaxer to see if that will provide additional symptomatic relief.  Will send patient to acute physical therapy for back pain.  - Ambulatory referral/consult to Physical/Occupational Therapy; Future  - cyclobenzaprine (FLEXERIL) 10 MG tablet; Take 1 tablet (10 mg total) by mouth 3 (three) times daily as needed for Muscle spasms.  Dispense: 60 tablet; Refill: 0    2. Elevated serum creatinine  Will check CMP to check renal function to see if we can get patient in NSAID will also check LFTs because he has been taking too much Tylenol.  - Comprehensive metabolic panel; Future    Have patient return in a few weeks if needed.    Tai Ambrocio MD

## 2020-08-07 ENCOUNTER — PATIENT OUTREACH (OUTPATIENT)
Dept: ADMINISTRATIVE | Facility: HOSPITAL | Age: 65
End: 2020-08-07

## 2020-12-21 ENCOUNTER — PATIENT OUTREACH (OUTPATIENT)
Dept: ADMINISTRATIVE | Facility: HOSPITAL | Age: 65
End: 2020-12-21

## 2020-12-28 DIAGNOSIS — R11.0 NAUSEA: ICD-10-CM

## 2020-12-28 DIAGNOSIS — K91.89 POSTCHOLECYSTECTOMY DIARRHEA: ICD-10-CM

## 2020-12-28 DIAGNOSIS — R19.7 POSTCHOLECYSTECTOMY DIARRHEA: ICD-10-CM

## 2020-12-28 RX ORDER — CHOLESTYRAMINE 4 G/9G
POWDER, FOR SUSPENSION ORAL
Qty: 60 PACKET | Refills: 5 | Status: SHIPPED | OUTPATIENT
Start: 2020-12-28 | End: 2021-08-05 | Stop reason: SDUPTHER

## 2021-01-05 ENCOUNTER — TELEPHONE (OUTPATIENT)
Dept: INTERNAL MEDICINE | Facility: CLINIC | Age: 66
End: 2021-01-05

## 2021-01-12 ENCOUNTER — PATIENT OUTREACH (OUTPATIENT)
Dept: ADMINISTRATIVE | Facility: OTHER | Age: 66
End: 2021-01-12

## 2021-01-14 ENCOUNTER — TELEPHONE (OUTPATIENT)
Dept: SLEEP MEDICINE | Facility: CLINIC | Age: 66
End: 2021-01-14

## 2021-02-26 DIAGNOSIS — I10 HYPERTENSION, UNSPECIFIED TYPE: ICD-10-CM

## 2021-02-26 DIAGNOSIS — E78.2 MIXED HYPERLIPIDEMIA: Primary | ICD-10-CM

## 2021-03-02 DIAGNOSIS — I10 HYPERTENSION, UNSPECIFIED TYPE: ICD-10-CM

## 2021-03-02 RX ORDER — AMLODIPINE BESYLATE 10 MG/1
TABLET ORAL
Qty: 90 TABLET | Refills: 0 | Status: SHIPPED | OUTPATIENT
Start: 2021-03-02 | End: 2021-03-02 | Stop reason: SDUPTHER

## 2021-03-03 RX ORDER — AMLODIPINE BESYLATE 10 MG/1
10 TABLET ORAL DAILY
Qty: 90 TABLET | Refills: 0 | Status: SHIPPED | OUTPATIENT
Start: 2021-03-03 | End: 2021-11-26

## 2021-03-29 ENCOUNTER — TELEPHONE (OUTPATIENT)
Dept: INTERNAL MEDICINE | Facility: CLINIC | Age: 66
End: 2021-03-29

## 2021-04-16 ENCOUNTER — PATIENT MESSAGE (OUTPATIENT)
Dept: RESEARCH | Facility: HOSPITAL | Age: 66
End: 2021-04-16

## 2021-04-26 ENCOUNTER — DOCUMENTATION ONLY (OUTPATIENT)
Dept: INTERNAL MEDICINE | Facility: CLINIC | Age: 66
End: 2021-04-26

## 2021-04-26 ENCOUNTER — PATIENT MESSAGE (OUTPATIENT)
Dept: PHARMACY | Facility: CLINIC | Age: 66
End: 2021-04-26

## 2021-04-26 ENCOUNTER — IMMUNIZATION (OUTPATIENT)
Dept: PHARMACY | Facility: CLINIC | Age: 66
End: 2021-04-26
Payer: MEDICARE

## 2021-04-26 ENCOUNTER — OFFICE VISIT (OUTPATIENT)
Dept: INTERNAL MEDICINE | Facility: CLINIC | Age: 66
End: 2021-04-26
Payer: MEDICARE

## 2021-04-26 VITALS
SYSTOLIC BLOOD PRESSURE: 116 MMHG | OXYGEN SATURATION: 97 % | HEART RATE: 83 BPM | HEIGHT: 66 IN | DIASTOLIC BLOOD PRESSURE: 72 MMHG | WEIGHT: 185.06 LBS | BODY MASS INDEX: 29.74 KG/M2

## 2021-04-26 DIAGNOSIS — G47.01 INSOMNIA DUE TO MEDICAL CONDITION: ICD-10-CM

## 2021-04-26 DIAGNOSIS — Z12.11 COLON CANCER SCREENING: ICD-10-CM

## 2021-04-26 DIAGNOSIS — I10 ESSENTIAL HYPERTENSION, BENIGN: ICD-10-CM

## 2021-04-26 DIAGNOSIS — G47.30 SLEEP APNEA, UNSPECIFIED TYPE: Primary | ICD-10-CM

## 2021-04-26 PROCEDURE — 3288F FALL RISK ASSESSMENT DOCD: CPT | Mod: CPTII,S$GLB,, | Performed by: INTERNAL MEDICINE

## 2021-04-26 PROCEDURE — 1101F PR PT FALLS ASSESS DOC 0-1 FALLS W/OUT INJ PAST YR: ICD-10-PCS | Mod: CPTII,S$GLB,, | Performed by: INTERNAL MEDICINE

## 2021-04-26 PROCEDURE — 3008F BODY MASS INDEX DOCD: CPT | Mod: CPTII,S$GLB,, | Performed by: INTERNAL MEDICINE

## 2021-04-26 PROCEDURE — 1126F AMNT PAIN NOTED NONE PRSNT: CPT | Mod: S$GLB,,, | Performed by: INTERNAL MEDICINE

## 2021-04-26 PROCEDURE — 99214 PR OFFICE/OUTPT VISIT, EST, LEVL IV, 30-39 MIN: ICD-10-PCS | Mod: S$GLB,,, | Performed by: INTERNAL MEDICINE

## 2021-04-26 PROCEDURE — 1126F PR PAIN SEVERITY QUANTIFIED, NO PAIN PRESENT: ICD-10-PCS | Mod: S$GLB,,, | Performed by: INTERNAL MEDICINE

## 2021-04-26 PROCEDURE — 99499 RISK ADDL DX/OHS AUDIT: ICD-10-PCS | Mod: S$GLB,,, | Performed by: INTERNAL MEDICINE

## 2021-04-26 PROCEDURE — 1101F PT FALLS ASSESS-DOCD LE1/YR: CPT | Mod: CPTII,S$GLB,, | Performed by: INTERNAL MEDICINE

## 2021-04-26 PROCEDURE — 1159F PR MEDICATION LIST DOCUMENTED IN MEDICAL RECORD: ICD-10-PCS | Mod: S$GLB,,, | Performed by: INTERNAL MEDICINE

## 2021-04-26 PROCEDURE — 3288F PR FALLS RISK ASSESSMENT DOCUMENTED: ICD-10-PCS | Mod: CPTII,S$GLB,, | Performed by: INTERNAL MEDICINE

## 2021-04-26 PROCEDURE — 99999 PR PBB SHADOW E&M-EST. PATIENT-LVL III: CPT | Mod: PBBFAC,,, | Performed by: INTERNAL MEDICINE

## 2021-04-26 PROCEDURE — 3008F PR BODY MASS INDEX (BMI) DOCUMENTED: ICD-10-PCS | Mod: CPTII,S$GLB,, | Performed by: INTERNAL MEDICINE

## 2021-04-26 PROCEDURE — 99499 UNLISTED E&M SERVICE: CPT | Mod: S$GLB,,, | Performed by: INTERNAL MEDICINE

## 2021-04-26 PROCEDURE — 99214 OFFICE O/P EST MOD 30 MIN: CPT | Mod: S$GLB,,, | Performed by: INTERNAL MEDICINE

## 2021-04-26 PROCEDURE — 99999 PR PBB SHADOW E&M-EST. PATIENT-LVL III: ICD-10-PCS | Mod: PBBFAC,,, | Performed by: INTERNAL MEDICINE

## 2021-04-26 PROCEDURE — 1159F MED LIST DOCD IN RCRD: CPT | Mod: S$GLB,,, | Performed by: INTERNAL MEDICINE

## 2021-04-26 RX ORDER — TRAZODONE HYDROCHLORIDE 50 MG/1
50 TABLET ORAL NIGHTLY
Qty: 30 TABLET | Refills: 1 | Status: SHIPPED | OUTPATIENT
Start: 2021-04-26 | End: 2022-04-13

## 2021-04-27 ENCOUNTER — TELEPHONE (OUTPATIENT)
Dept: INTERNAL MEDICINE | Facility: CLINIC | Age: 66
End: 2021-04-27

## 2021-06-11 ENCOUNTER — HOSPITAL ENCOUNTER (EMERGENCY)
Facility: HOSPITAL | Age: 66
Discharge: HOME | End: 2021-06-11
Attending: EMERGENCY MEDICINE
Payer: MEDICARE

## 2021-06-11 VITALS
HEIGHT: 67 IN | OXYGEN SATURATION: 99 % | TEMPERATURE: 99 F | BODY MASS INDEX: 28.88 KG/M2 | DIASTOLIC BLOOD PRESSURE: 77 MMHG | WEIGHT: 184 LBS | SYSTOLIC BLOOD PRESSURE: 140 MMHG | RESPIRATION RATE: 18 BRPM | HEART RATE: 77 BPM

## 2021-06-11 DIAGNOSIS — S33.8XXA SPRAIN OF GROIN, INITIAL ENCOUNTER: Primary | ICD-10-CM

## 2021-06-11 LAB
ALBUMIN SERPL BCP-MCNC: 3.8 G/DL (ref 3.5–5.2)
ALP SERPL-CCNC: 45 U/L (ref 55–135)
ALT SERPL W/O P-5'-P-CCNC: 13 U/L (ref 10–44)
ANION GAP SERPL CALC-SCNC: 12 MMOL/L (ref 8–16)
AST SERPL-CCNC: 21 U/L (ref 10–40)
BACTERIA #/AREA URNS AUTO: ABNORMAL /HPF
BASOPHILS # BLD AUTO: 0.05 K/UL (ref 0–0.2)
BASOPHILS NFR BLD: 1.1 % (ref 0–1.9)
BILIRUB SERPL-MCNC: 0.9 MG/DL (ref 0.1–1)
BILIRUB UR QL STRIP: NEGATIVE
BUN SERPL-MCNC: 13 MG/DL (ref 6–30)
BUN SERPL-MCNC: 13 MG/DL (ref 8–23)
CALCIUM SERPL-MCNC: 9.7 MG/DL (ref 8.7–10.5)
CHLORIDE SERPL-SCNC: 108 MMOL/L (ref 95–110)
CHLORIDE SERPL-SCNC: 111 MMOL/L (ref 95–110)
CLARITY UR REFRACT.AUTO: CLEAR
CO2 SERPL-SCNC: 20 MMOL/L (ref 23–29)
COLOR UR AUTO: YELLOW
CREAT SERPL-MCNC: 1.1 MG/DL (ref 0.5–1.4)
CREAT SERPL-MCNC: 1.3 MG/DL (ref 0.5–1.4)
DIFFERENTIAL METHOD: ABNORMAL
EOSINOPHIL # BLD AUTO: 0.1 K/UL (ref 0–0.5)
EOSINOPHIL NFR BLD: 2.8 % (ref 0–8)
ERYTHROCYTE [DISTWIDTH] IN BLOOD BY AUTOMATED COUNT: 13.4 % (ref 11.5–14.5)
EST. GFR  (AFRICAN AMERICAN): >60 ML/MIN/1.73 M^2
EST. GFR  (NON AFRICAN AMERICAN): >60 ML/MIN/1.73 M^2
GLUCOSE SERPL-MCNC: 95 MG/DL (ref 70–110)
GLUCOSE SERPL-MCNC: 98 MG/DL (ref 70–110)
GLUCOSE UR QL STRIP: NEGATIVE
HCT VFR BLD AUTO: 31.8 % (ref 40–54)
HCT VFR BLD CALC: 33 %PCV (ref 36–54)
HGB BLD-MCNC: 10.4 G/DL (ref 14–18)
HGB UR QL STRIP: NEGATIVE
HYALINE CASTS UR QL AUTO: 4 /LPF
IMM GRANULOCYTES # BLD AUTO: 0.01 K/UL (ref 0–0.04)
IMM GRANULOCYTES NFR BLD AUTO: 0.2 % (ref 0–0.5)
KETONES UR QL STRIP: NEGATIVE
LEUKOCYTE ESTERASE UR QL STRIP: NEGATIVE
LYMPHOCYTES # BLD AUTO: 1.2 K/UL (ref 1–4.8)
LYMPHOCYTES NFR BLD: 24.7 % (ref 18–48)
MAGNESIUM SERPL-MCNC: 1.9 MG/DL (ref 1.6–2.6)
MCH RBC QN AUTO: 32.4 PG (ref 27–31)
MCHC RBC AUTO-ENTMCNC: 32.7 G/DL (ref 32–36)
MCV RBC AUTO: 99 FL (ref 82–98)
MICROSCOPIC COMMENT: ABNORMAL
MONOCYTES # BLD AUTO: 0.5 K/UL (ref 0.3–1)
MONOCYTES NFR BLD: 9.8 % (ref 4–15)
NEUTROPHILS # BLD AUTO: 2.9 K/UL (ref 1.8–7.7)
NEUTROPHILS NFR BLD: 61.4 % (ref 38–73)
NITRITE UR QL STRIP: NEGATIVE
NRBC BLD-RTO: 0 /100 WBC
PH UR STRIP: 6 [PH] (ref 5–8)
PLATELET # BLD AUTO: 268 K/UL (ref 150–450)
PMV BLD AUTO: 9.5 FL (ref 9.2–12.9)
POC IONIZED CALCIUM: 1.26 MMOL/L (ref 1.06–1.42)
POC TCO2 (MEASURED): 23 MMOL/L (ref 23–29)
POTASSIUM BLD-SCNC: 2.9 MMOL/L (ref 3.5–5.1)
POTASSIUM SERPL-SCNC: 3 MMOL/L (ref 3.5–5.1)
PROT SERPL-MCNC: 7.1 G/DL (ref 6–8.4)
PROT UR QL STRIP: ABNORMAL
RBC # BLD AUTO: 3.21 M/UL (ref 4.6–6.2)
RBC #/AREA URNS AUTO: 1 /HPF (ref 0–4)
SAMPLE: ABNORMAL
SODIUM BLD-SCNC: 144 MMOL/L (ref 136–145)
SODIUM SERPL-SCNC: 143 MMOL/L (ref 136–145)
SP GR UR STRIP: 1.02 (ref 1–1.03)
URN SPEC COLLECT METH UR: ABNORMAL
WBC # BLD AUTO: 4.7 K/UL (ref 3.9–12.7)
WBC #/AREA URNS AUTO: 2 /HPF (ref 0–5)

## 2021-06-11 PROCEDURE — 80053 COMPREHEN METABOLIC PANEL: CPT | Performed by: PHYSICIAN ASSISTANT

## 2021-06-11 PROCEDURE — 25000003 PHARM REV CODE 250: Performed by: PHYSICIAN ASSISTANT

## 2021-06-11 PROCEDURE — 85025 COMPLETE CBC W/AUTO DIFF WBC: CPT | Performed by: PHYSICIAN ASSISTANT

## 2021-06-11 PROCEDURE — 96374 THER/PROPH/DIAG INJ IV PUSH: CPT | Mod: 74

## 2021-06-11 PROCEDURE — 83735 ASSAY OF MAGNESIUM: CPT | Performed by: PHYSICIAN ASSISTANT

## 2021-06-11 PROCEDURE — 25500020 PHARM REV CODE 255: Performed by: EMERGENCY MEDICINE

## 2021-06-11 PROCEDURE — 99285 EMERGENCY DEPT VISIT HI MDM: CPT | Mod: 25

## 2021-06-11 PROCEDURE — 99284 PR EMERGENCY DEPT VISIT,LEVEL IV: ICD-10-PCS | Mod: ,,, | Performed by: PHYSICIAN ASSISTANT

## 2021-06-11 PROCEDURE — 63600175 PHARM REV CODE 636 W HCPCS: Performed by: PHYSICIAN ASSISTANT

## 2021-06-11 PROCEDURE — 80047 BASIC METABLC PNL IONIZED CA: CPT | Mod: 91

## 2021-06-11 PROCEDURE — 81001 URINALYSIS AUTO W/SCOPE: CPT | Performed by: PHYSICIAN ASSISTANT

## 2021-06-11 PROCEDURE — 86803 HEPATITIS C AB TEST: CPT | Performed by: EMERGENCY MEDICINE

## 2021-06-11 PROCEDURE — 99284 EMERGENCY DEPT VISIT MOD MDM: CPT | Mod: ,,, | Performed by: PHYSICIAN ASSISTANT

## 2021-06-11 RX ORDER — KETOROLAC TROMETHAMINE 30 MG/ML
10 INJECTION, SOLUTION INTRAMUSCULAR; INTRAVENOUS
Status: COMPLETED | OUTPATIENT
Start: 2021-06-11 | End: 2021-06-11

## 2021-06-11 RX ORDER — NAPROXEN 500 MG/1
500 TABLET ORAL 2 TIMES DAILY WITH MEALS
Qty: 20 TABLET | Refills: 0 | Status: SHIPPED | OUTPATIENT
Start: 2021-06-11 | End: 2021-06-21

## 2021-06-11 RX ADMIN — KETOROLAC TROMETHAMINE 10 MG: 30 INJECTION, SOLUTION INTRAMUSCULAR; INTRAVENOUS at 07:06

## 2021-06-11 RX ADMIN — POTASSIUM BICARBONATE 50 MEQ: 978 TABLET, EFFERVESCENT ORAL at 07:06

## 2021-06-11 RX ADMIN — IOHEXOL 75 ML: 350 INJECTION, SOLUTION INTRAVENOUS at 08:06

## 2021-06-17 LAB — HCV AB SERPL QL IA: NEGATIVE

## 2021-06-18 ENCOUNTER — PATIENT OUTREACH (OUTPATIENT)
Dept: ADMINISTRATIVE | Facility: OTHER | Age: 66
End: 2021-06-18

## 2021-06-18 ENCOUNTER — TELEPHONE (OUTPATIENT)
Dept: SLEEP MEDICINE | Facility: CLINIC | Age: 66
End: 2021-06-18

## 2021-06-21 ENCOUNTER — OFFICE VISIT (OUTPATIENT)
Dept: SLEEP MEDICINE | Facility: CLINIC | Age: 66
End: 2021-06-21
Payer: MEDICARE

## 2021-06-21 VITALS
SYSTOLIC BLOOD PRESSURE: 150 MMHG | DIASTOLIC BLOOD PRESSURE: 85 MMHG | WEIGHT: 184.94 LBS | BODY MASS INDEX: 28.97 KG/M2

## 2021-06-21 DIAGNOSIS — R06.83 SNORING: ICD-10-CM

## 2021-06-21 DIAGNOSIS — G47.30 SLEEP APNEA, UNSPECIFIED TYPE: ICD-10-CM

## 2021-06-21 DIAGNOSIS — I10 ESSENTIAL HYPERTENSION: ICD-10-CM

## 2021-06-21 DIAGNOSIS — G47.33 OSA (OBSTRUCTIVE SLEEP APNEA): Primary | ICD-10-CM

## 2021-06-21 DIAGNOSIS — Z12.11 SPECIAL SCREENING FOR MALIGNANT NEOPLASM OF COLON: Primary | ICD-10-CM

## 2021-06-21 DIAGNOSIS — E78.2 MIXED HYPERLIPIDEMIA: ICD-10-CM

## 2021-06-21 PROCEDURE — 99203 PR OFFICE/OUTPT VISIT, NEW, LEVL III, 30-44 MIN: ICD-10-PCS | Mod: S$GLB,,, | Performed by: INTERNAL MEDICINE

## 2021-06-21 PROCEDURE — 3008F BODY MASS INDEX DOCD: CPT | Mod: CPTII,S$GLB,, | Performed by: INTERNAL MEDICINE

## 2021-06-21 PROCEDURE — 99999 PR PBB SHADOW E&M-EST. PATIENT-LVL III: ICD-10-PCS | Mod: PBBFAC,,, | Performed by: INTERNAL MEDICINE

## 2021-06-21 PROCEDURE — 1159F PR MEDICATION LIST DOCUMENTED IN MEDICAL RECORD: ICD-10-PCS | Mod: S$GLB,,, | Performed by: INTERNAL MEDICINE

## 2021-06-21 PROCEDURE — 99203 OFFICE O/P NEW LOW 30 MIN: CPT | Mod: S$GLB,,, | Performed by: INTERNAL MEDICINE

## 2021-06-21 PROCEDURE — 3008F PR BODY MASS INDEX (BMI) DOCUMENTED: ICD-10-PCS | Mod: CPTII,S$GLB,, | Performed by: INTERNAL MEDICINE

## 2021-06-21 PROCEDURE — 1101F PR PT FALLS ASSESS DOC 0-1 FALLS W/OUT INJ PAST YR: ICD-10-PCS | Mod: CPTII,S$GLB,, | Performed by: INTERNAL MEDICINE

## 2021-06-21 PROCEDURE — 1159F MED LIST DOCD IN RCRD: CPT | Mod: S$GLB,,, | Performed by: INTERNAL MEDICINE

## 2021-06-21 PROCEDURE — 99999 PR PBB SHADOW E&M-EST. PATIENT-LVL III: CPT | Mod: PBBFAC,,, | Performed by: INTERNAL MEDICINE

## 2021-06-21 PROCEDURE — 1101F PT FALLS ASSESS-DOCD LE1/YR: CPT | Mod: CPTII,S$GLB,, | Performed by: INTERNAL MEDICINE

## 2021-06-21 PROCEDURE — 3288F FALL RISK ASSESSMENT DOCD: CPT | Mod: CPTII,S$GLB,, | Performed by: INTERNAL MEDICINE

## 2021-06-21 PROCEDURE — 3288F PR FALLS RISK ASSESSMENT DOCUMENTED: ICD-10-PCS | Mod: CPTII,S$GLB,, | Performed by: INTERNAL MEDICINE

## 2021-07-06 ENCOUNTER — PATIENT MESSAGE (OUTPATIENT)
Dept: ADMINISTRATIVE | Facility: HOSPITAL | Age: 66
End: 2021-07-06

## 2021-07-16 ENCOUNTER — HOSPITAL ENCOUNTER (OUTPATIENT)
Dept: SLEEP MEDICINE | Facility: HOSPITAL | Age: 66
Discharge: HOME | End: 2021-07-16
Attending: INTERNAL MEDICINE
Payer: MEDICARE

## 2021-07-16 ENCOUNTER — TELEPHONE (OUTPATIENT)
Dept: SLEEP MEDICINE | Facility: CLINIC | Age: 66
End: 2021-07-16

## 2021-07-16 DIAGNOSIS — G47.30 SLEEP APNEA, UNSPECIFIED TYPE: ICD-10-CM

## 2021-07-16 DIAGNOSIS — G47.33 OSA (OBSTRUCTIVE SLEEP APNEA): ICD-10-CM

## 2021-07-16 DIAGNOSIS — E78.2 MIXED HYPERLIPIDEMIA: ICD-10-CM

## 2021-07-16 DIAGNOSIS — R06.83 SNORING: ICD-10-CM

## 2021-07-16 DIAGNOSIS — I10 ESSENTIAL HYPERTENSION: ICD-10-CM

## 2021-07-16 PROCEDURE — 95800 SLP STDY UNATTENDED: CPT | Mod: 26,,, | Performed by: INTERNAL MEDICINE

## 2021-07-16 PROCEDURE — 95800 SLP STDY UNATTENDED: CPT

## 2021-07-16 PROCEDURE — 95800 PR SLEEP STUDY, UNATTENDED, RECORD HEART RATE/O2 SAT/RESP ANAL/SLEEP TIME: ICD-10-PCS | Mod: 26,,, | Performed by: INTERNAL MEDICINE

## 2021-07-28 ENCOUNTER — PATIENT MESSAGE (OUTPATIENT)
Dept: SLEEP MEDICINE | Facility: CLINIC | Age: 66
End: 2021-07-28

## 2021-07-28 DIAGNOSIS — G47.33 OSA (OBSTRUCTIVE SLEEP APNEA): Primary | ICD-10-CM

## 2021-08-05 ENCOUNTER — PATIENT OUTREACH (OUTPATIENT)
Dept: ADMINISTRATIVE | Facility: OTHER | Age: 66
End: 2021-08-05

## 2021-08-05 ENCOUNTER — OFFICE VISIT (OUTPATIENT)
Dept: INTERNAL MEDICINE | Facility: CLINIC | Age: 66
End: 2021-08-05
Payer: MEDICARE

## 2021-08-05 VITALS
DIASTOLIC BLOOD PRESSURE: 70 MMHG | HEART RATE: 57 BPM | BODY MASS INDEX: 27.41 KG/M2 | HEIGHT: 67 IN | WEIGHT: 174.63 LBS | SYSTOLIC BLOOD PRESSURE: 118 MMHG | OXYGEN SATURATION: 98 %

## 2021-08-05 DIAGNOSIS — Z01.818 PREOPERATIVE CLEARANCE: ICD-10-CM

## 2021-08-05 DIAGNOSIS — K91.89 POSTCHOLECYSTECTOMY DIARRHEA: ICD-10-CM

## 2021-08-05 DIAGNOSIS — K40.90 INGUINAL HERNIA OF RIGHT SIDE WITHOUT OBSTRUCTION OR GANGRENE: Primary | ICD-10-CM

## 2021-08-05 DIAGNOSIS — R19.7 POSTCHOLECYSTECTOMY DIARRHEA: ICD-10-CM

## 2021-08-05 DIAGNOSIS — Z12.11 COLON CANCER SCREENING: ICD-10-CM

## 2021-08-05 DIAGNOSIS — R11.0 NAUSEA: ICD-10-CM

## 2021-08-05 PROCEDURE — 99999 PR PBB SHADOW E&M-EST. PATIENT-LVL III: ICD-10-PCS | Mod: PBBFAC,GC,, | Performed by: STUDENT IN AN ORGANIZED HEALTH CARE EDUCATION/TRAINING PROGRAM

## 2021-08-05 PROCEDURE — 99213 PR OFFICE/OUTPT VISIT, EST, LEVL III, 20-29 MIN: ICD-10-PCS | Mod: GC,S$GLB,, | Performed by: STUDENT IN AN ORGANIZED HEALTH CARE EDUCATION/TRAINING PROGRAM

## 2021-08-05 PROCEDURE — 99999 PR PBB SHADOW E&M-EST. PATIENT-LVL III: CPT | Mod: PBBFAC,GC,, | Performed by: STUDENT IN AN ORGANIZED HEALTH CARE EDUCATION/TRAINING PROGRAM

## 2021-08-05 PROCEDURE — 99213 OFFICE O/P EST LOW 20 MIN: CPT | Mod: GC,S$GLB,, | Performed by: STUDENT IN AN ORGANIZED HEALTH CARE EDUCATION/TRAINING PROGRAM

## 2021-08-05 RX ORDER — CHOLESTYRAMINE 4 G/9G
POWDER, FOR SUSPENSION ORAL
Qty: 60 PACKET | Refills: 5 | Status: SHIPPED | OUTPATIENT
Start: 2021-08-05 | End: 2022-12-15

## 2021-08-06 ENCOUNTER — OFFICE VISIT (OUTPATIENT)
Dept: SURGERY | Facility: CLINIC | Age: 66
End: 2021-08-06
Payer: MEDICARE

## 2021-08-06 VITALS
BODY MASS INDEX: 27.6 KG/M2 | HEART RATE: 51 BPM | WEIGHT: 175.81 LBS | TEMPERATURE: 98 F | HEIGHT: 67 IN | SYSTOLIC BLOOD PRESSURE: 143 MMHG | DIASTOLIC BLOOD PRESSURE: 78 MMHG

## 2021-08-06 DIAGNOSIS — K40.90 INGUINAL HERNIA OF RIGHT SIDE WITHOUT OBSTRUCTION OR GANGRENE: ICD-10-CM

## 2021-08-06 PROCEDURE — 99999 PR PBB SHADOW E&M-EST. PATIENT-LVL III: CPT | Mod: PBBFAC,,, | Performed by: SURGERY

## 2021-08-06 PROCEDURE — 3008F BODY MASS INDEX DOCD: CPT | Mod: CPTII,S$GLB,, | Performed by: SURGERY

## 2021-08-06 PROCEDURE — 1160F PR REVIEW ALL MEDS BY PRESCRIBER/CLIN PHARMACIST DOCUMENTED: ICD-10-PCS | Mod: CPTII,S$GLB,, | Performed by: SURGERY

## 2021-08-06 PROCEDURE — 1101F PR PT FALLS ASSESS DOC 0-1 FALLS W/OUT INJ PAST YR: ICD-10-PCS | Mod: CPTII,S$GLB,, | Performed by: SURGERY

## 2021-08-06 PROCEDURE — 3288F FALL RISK ASSESSMENT DOCD: CPT | Mod: CPTII,S$GLB,, | Performed by: SURGERY

## 2021-08-06 PROCEDURE — 99999 PR PBB SHADOW E&M-EST. PATIENT-LVL III: ICD-10-PCS | Mod: PBBFAC,,, | Performed by: SURGERY

## 2021-08-06 PROCEDURE — 1159F MED LIST DOCD IN RCRD: CPT | Mod: CPTII,S$GLB,, | Performed by: SURGERY

## 2021-08-06 PROCEDURE — 3078F PR MOST RECENT DIASTOLIC BLOOD PRESSURE < 80 MM HG: ICD-10-PCS | Mod: CPTII,S$GLB,, | Performed by: SURGERY

## 2021-08-06 PROCEDURE — 3077F SYST BP >= 140 MM HG: CPT | Mod: CPTII,S$GLB,, | Performed by: SURGERY

## 2021-08-06 PROCEDURE — 3077F PR MOST RECENT SYSTOLIC BLOOD PRESSURE >= 140 MM HG: ICD-10-PCS | Mod: CPTII,S$GLB,, | Performed by: SURGERY

## 2021-08-06 PROCEDURE — 1160F RVW MEDS BY RX/DR IN RCRD: CPT | Mod: CPTII,S$GLB,, | Performed by: SURGERY

## 2021-08-06 PROCEDURE — 3078F DIAST BP <80 MM HG: CPT | Mod: CPTII,S$GLB,, | Performed by: SURGERY

## 2021-08-06 PROCEDURE — 99214 PR OFFICE/OUTPT VISIT, EST, LEVL IV, 30-39 MIN: ICD-10-PCS | Mod: S$GLB,,, | Performed by: SURGERY

## 2021-08-06 PROCEDURE — 1101F PT FALLS ASSESS-DOCD LE1/YR: CPT | Mod: CPTII,S$GLB,, | Performed by: SURGERY

## 2021-08-06 PROCEDURE — 3008F PR BODY MASS INDEX (BMI) DOCUMENTED: ICD-10-PCS | Mod: CPTII,S$GLB,, | Performed by: SURGERY

## 2021-08-06 PROCEDURE — 1126F AMNT PAIN NOTED NONE PRSNT: CPT | Mod: CPTII,S$GLB,, | Performed by: SURGERY

## 2021-08-06 PROCEDURE — 99214 OFFICE O/P EST MOD 30 MIN: CPT | Mod: S$GLB,,, | Performed by: SURGERY

## 2021-08-06 PROCEDURE — 3288F PR FALLS RISK ASSESSMENT DOCUMENTED: ICD-10-PCS | Mod: CPTII,S$GLB,, | Performed by: SURGERY

## 2021-08-06 PROCEDURE — 1159F PR MEDICATION LIST DOCUMENTED IN MEDICAL RECORD: ICD-10-PCS | Mod: CPTII,S$GLB,, | Performed by: SURGERY

## 2021-08-06 PROCEDURE — 1126F PR PAIN SEVERITY QUANTIFIED, NO PAIN PRESENT: ICD-10-PCS | Mod: CPTII,S$GLB,, | Performed by: SURGERY

## 2021-08-06 RX ORDER — CEFAZOLIN SODIUM 2 G/50ML
2 SOLUTION INTRAVENOUS
Status: CANCELLED | OUTPATIENT
Start: 2021-08-06

## 2021-08-10 ENCOUNTER — LAB VISIT (OUTPATIENT)
Dept: LAB | Facility: HOSPITAL | Age: 66
End: 2021-08-10
Attending: STUDENT IN AN ORGANIZED HEALTH CARE EDUCATION/TRAINING PROGRAM
Payer: MEDICARE

## 2021-08-10 DIAGNOSIS — Z12.11 COLON CANCER SCREENING: ICD-10-CM

## 2021-08-10 PROCEDURE — 82274 ASSAY TEST FOR BLOOD FECAL: CPT | Performed by: STUDENT IN AN ORGANIZED HEALTH CARE EDUCATION/TRAINING PROGRAM

## 2021-08-18 LAB — HEMOCCULT STL QL IA: NEGATIVE

## 2021-08-20 DIAGNOSIS — K40.90 RIGHT INGUINAL HERNIA: Primary | ICD-10-CM

## 2021-09-11 ENCOUNTER — HOSPITAL ENCOUNTER (EMERGENCY)
Facility: HOSPITAL | Age: 66
Discharge: HOME | End: 2021-09-11
Attending: EMERGENCY MEDICINE
Payer: MEDICARE

## 2021-09-11 VITALS
BODY MASS INDEX: 25.43 KG/M2 | OXYGEN SATURATION: 98 % | HEART RATE: 47 BPM | WEIGHT: 162 LBS | TEMPERATURE: 98 F | DIASTOLIC BLOOD PRESSURE: 81 MMHG | RESPIRATION RATE: 18 BRPM | SYSTOLIC BLOOD PRESSURE: 151 MMHG | HEIGHT: 67 IN

## 2021-09-11 DIAGNOSIS — E87.6 HYPOKALEMIA: ICD-10-CM

## 2021-09-11 DIAGNOSIS — K40.91 UNILATERAL RECURRENT INGUINAL HERNIA WITHOUT OBSTRUCTION OR GANGRENE: Primary | ICD-10-CM

## 2021-09-11 LAB
ALBUMIN SERPL BCP-MCNC: 3.3 G/DL (ref 3.5–5.2)
ALP SERPL-CCNC: 39 U/L (ref 55–135)
ALT SERPL W/O P-5'-P-CCNC: 10 U/L (ref 10–44)
ANION GAP SERPL CALC-SCNC: 8 MMOL/L (ref 8–16)
AST SERPL-CCNC: 19 U/L (ref 10–40)
BASOPHILS # BLD AUTO: 0.03 K/UL (ref 0–0.2)
BASOPHILS NFR BLD: 0.7 % (ref 0–1.9)
BILIRUB SERPL-MCNC: 0.2 MG/DL (ref 0.1–1)
BUN SERPL-MCNC: 13 MG/DL (ref 8–23)
CALCIUM SERPL-MCNC: 9.7 MG/DL (ref 8.7–10.5)
CHLORIDE SERPL-SCNC: 110 MMOL/L (ref 95–110)
CO2 SERPL-SCNC: 24 MMOL/L (ref 23–29)
CREAT SERPL-MCNC: 1.2 MG/DL (ref 0.5–1.4)
DIFFERENTIAL METHOD: ABNORMAL
EOSINOPHIL # BLD AUTO: 0.2 K/UL (ref 0–0.5)
EOSINOPHIL NFR BLD: 3.5 % (ref 0–8)
ERYTHROCYTE [DISTWIDTH] IN BLOOD BY AUTOMATED COUNT: 13.3 % (ref 11.5–14.5)
EST. GFR  (AFRICAN AMERICAN): >60 ML/MIN/1.73 M^2
EST. GFR  (NON AFRICAN AMERICAN): >60 ML/MIN/1.73 M^2
GLUCOSE SERPL-MCNC: 101 MG/DL (ref 70–110)
HCT VFR BLD AUTO: 30.4 % (ref 40–54)
HGB BLD-MCNC: 10.7 G/DL (ref 14–18)
IMM GRANULOCYTES # BLD AUTO: 0.01 K/UL (ref 0–0.04)
IMM GRANULOCYTES NFR BLD AUTO: 0.2 % (ref 0–0.5)
LYMPHOCYTES # BLD AUTO: 1.3 K/UL (ref 1–4.8)
LYMPHOCYTES NFR BLD: 28.4 % (ref 18–48)
MCH RBC QN AUTO: 33.8 PG (ref 27–31)
MCHC RBC AUTO-ENTMCNC: 35.2 G/DL (ref 32–36)
MCV RBC AUTO: 96 FL (ref 82–98)
MONOCYTES # BLD AUTO: 0.4 K/UL (ref 0.3–1)
MONOCYTES NFR BLD: 9.6 % (ref 4–15)
NEUTROPHILS # BLD AUTO: 2.6 K/UL (ref 1.8–7.7)
NEUTROPHILS NFR BLD: 57.6 % (ref 38–73)
NRBC BLD-RTO: 0 /100 WBC
PLATELET # BLD AUTO: 213 K/UL (ref 150–450)
PMV BLD AUTO: 9.6 FL (ref 9.2–12.9)
POTASSIUM SERPL-SCNC: 2.8 MMOL/L (ref 3.5–5.1)
PROT SERPL-MCNC: 6.3 G/DL (ref 6–8.4)
RBC # BLD AUTO: 3.17 M/UL (ref 4.6–6.2)
SODIUM SERPL-SCNC: 142 MMOL/L (ref 136–145)
WBC # BLD AUTO: 4.57 K/UL (ref 3.9–12.7)

## 2021-09-11 PROCEDURE — 80053 COMPREHEN METABOLIC PANEL: CPT | Performed by: EMERGENCY MEDICINE

## 2021-09-11 PROCEDURE — 99285 EMERGENCY DEPT VISIT HI MDM: CPT | Mod: 25

## 2021-09-11 PROCEDURE — 96375 TX/PRO/DX INJ NEW DRUG ADDON: CPT

## 2021-09-11 PROCEDURE — 25000003 PHARM REV CODE 250: Performed by: EMERGENCY MEDICINE

## 2021-09-11 PROCEDURE — 96374 THER/PROPH/DIAG INJ IV PUSH: CPT

## 2021-09-11 PROCEDURE — 85025 COMPLETE CBC W/AUTO DIFF WBC: CPT | Performed by: EMERGENCY MEDICINE

## 2021-09-11 PROCEDURE — 25500020 PHARM REV CODE 255: Performed by: EMERGENCY MEDICINE

## 2021-09-11 PROCEDURE — 96361 HYDRATE IV INFUSION ADD-ON: CPT

## 2021-09-11 PROCEDURE — 63600175 PHARM REV CODE 636 W HCPCS: Performed by: EMERGENCY MEDICINE

## 2021-09-11 RX ORDER — HYDROMORPHONE HYDROCHLORIDE 2 MG/ML
1 INJECTION, SOLUTION INTRAMUSCULAR; INTRAVENOUS; SUBCUTANEOUS
Status: COMPLETED | OUTPATIENT
Start: 2021-09-11 | End: 2021-09-11

## 2021-09-11 RX ORDER — NAPROXEN 500 MG/1
500 TABLET ORAL 2 TIMES DAILY WITH MEALS
Qty: 14 TABLET | Refills: 0 | Status: SHIPPED | OUTPATIENT
Start: 2021-09-11 | End: 2021-09-18

## 2021-09-11 RX ORDER — LANOLIN ALCOHOL/MO/W.PET/CERES
400 CREAM (GRAM) TOPICAL ONCE
Status: COMPLETED | OUTPATIENT
Start: 2021-09-11 | End: 2021-09-11

## 2021-09-11 RX ORDER — ONDANSETRON 2 MG/ML
4 INJECTION INTRAMUSCULAR; INTRAVENOUS
Status: COMPLETED | OUTPATIENT
Start: 2021-09-11 | End: 2021-09-11

## 2021-09-11 RX ORDER — HYDROCODONE BITARTRATE AND ACETAMINOPHEN 5; 325 MG/1; MG/1
1 TABLET ORAL EVERY 6 HOURS PRN
Qty: 12 TABLET | Refills: 0 | Status: SHIPPED | OUTPATIENT
Start: 2021-09-11 | End: 2021-09-14

## 2021-09-11 RX ADMIN — HYDROMORPHONE HYDROCHLORIDE 1 MG: 2 INJECTION INTRAMUSCULAR; INTRAVENOUS; SUBCUTANEOUS at 01:09

## 2021-09-11 RX ADMIN — SODIUM CHLORIDE 1000 ML: 0.9 INJECTION, SOLUTION INTRAVENOUS at 01:09

## 2021-09-11 RX ADMIN — POTASSIUM BICARBONATE 40 MEQ: 391 TABLET, EFFERVESCENT ORAL at 03:09

## 2021-09-11 RX ADMIN — IOHEXOL 80 ML: 350 INJECTION, SOLUTION INTRAVENOUS at 02:09

## 2021-09-11 RX ADMIN — ONDANSETRON 4 MG: 2 INJECTION INTRAMUSCULAR; INTRAVENOUS at 01:09

## 2021-09-11 RX ADMIN — Medication 400 MG: at 03:09

## 2021-09-13 ENCOUNTER — TELEPHONE (OUTPATIENT)
Dept: SURGERY | Facility: CLINIC | Age: 66
End: 2021-09-13

## 2021-09-15 ENCOUNTER — TELEPHONE (OUTPATIENT)
Dept: SURGERY | Facility: CLINIC | Age: 66
End: 2021-09-15

## 2021-09-16 ENCOUNTER — TELEPHONE (OUTPATIENT)
Dept: SURGERY | Facility: CLINIC | Age: 66
End: 2021-09-16

## 2021-09-16 DIAGNOSIS — Z01.818 PREOP TESTING: Primary | ICD-10-CM

## 2021-09-17 ENCOUNTER — TELEPHONE (OUTPATIENT)
Dept: INTERNAL MEDICINE | Facility: CLINIC | Age: 66
End: 2021-09-17

## 2021-09-17 ENCOUNTER — LAB VISIT (OUTPATIENT)
Dept: SURGERY | Facility: CLINIC | Age: 66
End: 2021-09-17
Payer: MEDICARE

## 2021-09-17 ENCOUNTER — TELEPHONE (OUTPATIENT)
Dept: SURGERY | Facility: CLINIC | Age: 66
End: 2021-09-17

## 2021-09-17 DIAGNOSIS — Z01.818 PREOP TESTING: ICD-10-CM

## 2021-09-17 LAB
SARS-COV-2 RNA RESP QL NAA+PROBE: NOT DETECTED
SARS-COV-2- CYCLE NUMBER: NORMAL

## 2021-09-17 PROCEDURE — U0005 INFEC AGEN DETEC AMPLI PROBE: HCPCS | Performed by: SURGERY

## 2021-09-17 PROCEDURE — U0003 INFECTIOUS AGENT DETECTION BY NUCLEIC ACID (DNA OR RNA); SEVERE ACUTE RESPIRATORY SYNDROME CORONAVIRUS 2 (SARS-COV-2) (CORONAVIRUS DISEASE [COVID-19]), AMPLIFIED PROBE TECHNIQUE, MAKING USE OF HIGH THROUGHPUT TECHNOLOGIES AS DESCRIBED BY CMS-2020-01-R: HCPCS | Performed by: SURGERY

## 2021-09-19 ENCOUNTER — ANESTHESIA EVENT (OUTPATIENT)
Dept: SURGERY | Facility: HOSPITAL | Age: 66
End: 2021-09-19
Payer: MEDICARE

## 2021-09-20 ENCOUNTER — ANESTHESIA (OUTPATIENT)
Dept: SURGERY | Facility: HOSPITAL | Age: 66
End: 2021-09-20
Payer: MEDICARE

## 2021-09-20 ENCOUNTER — HOSPITAL ENCOUNTER (OUTPATIENT)
Facility: HOSPITAL | Age: 66
Discharge: HOME | End: 2021-09-20
Attending: SURGERY | Admitting: SURGERY
Payer: MEDICARE

## 2021-09-20 VITALS
DIASTOLIC BLOOD PRESSURE: 85 MMHG | SYSTOLIC BLOOD PRESSURE: 150 MMHG | RESPIRATION RATE: 22 BRPM | TEMPERATURE: 97 F | BODY MASS INDEX: 26.36 KG/M2 | HEIGHT: 66 IN | HEART RATE: 76 BPM | OXYGEN SATURATION: 99 % | WEIGHT: 164 LBS

## 2021-09-20 DIAGNOSIS — K40.90 INGUINAL HERNIA OF RIGHT SIDE WITHOUT OBSTRUCTION OR GANGRENE: Primary | ICD-10-CM

## 2021-09-20 PROCEDURE — C1727 CATH, BAL TIS DIS, NON-VAS: HCPCS | Performed by: SURGERY

## 2021-09-20 PROCEDURE — 49650 PR LAP,INGUINAL HERNIA REPR,INITIAL: ICD-10-PCS | Mod: RT,,, | Performed by: SURGERY

## 2021-09-20 PROCEDURE — 36000709 HC OR TIME LEV III EA ADD 15 MIN: Performed by: SURGERY

## 2021-09-20 PROCEDURE — D9220A PRA ANESTHESIA: ICD-10-PCS | Mod: ,,, | Performed by: ANESTHESIOLOGY

## 2021-09-20 PROCEDURE — 63600175 PHARM REV CODE 636 W HCPCS: Performed by: STUDENT IN AN ORGANIZED HEALTH CARE EDUCATION/TRAINING PROGRAM

## 2021-09-20 PROCEDURE — D9220A PRA ANESTHESIA: Mod: ,,, | Performed by: ANESTHESIOLOGY

## 2021-09-20 PROCEDURE — 63600175 PHARM REV CODE 636 W HCPCS: Performed by: SURGERY

## 2021-09-20 PROCEDURE — 36000708 HC OR TIME LEV III 1ST 15 MIN: Performed by: SURGERY

## 2021-09-20 PROCEDURE — 37000008 HC ANESTHESIA 1ST 15 MINUTES: Performed by: SURGERY

## 2021-09-20 PROCEDURE — 25000003 PHARM REV CODE 250: Performed by: SURGERY

## 2021-09-20 PROCEDURE — 37000009 HC ANESTHESIA EA ADD 15 MINS: Performed by: SURGERY

## 2021-09-20 PROCEDURE — 25000003 PHARM REV CODE 250: Performed by: STUDENT IN AN ORGANIZED HEALTH CARE EDUCATION/TRAINING PROGRAM

## 2021-09-20 PROCEDURE — 49650 LAP ING HERNIA REPAIR INIT: CPT | Mod: RT,,, | Performed by: SURGERY

## 2021-09-20 PROCEDURE — 63600175 PHARM REV CODE 636 W HCPCS: Performed by: ANESTHESIOLOGY

## 2021-09-20 PROCEDURE — 71000015 HC POSTOP RECOV 1ST HR: Performed by: SURGERY

## 2021-09-20 PROCEDURE — C1781 MESH (IMPLANTABLE): HCPCS | Performed by: SURGERY

## 2021-09-20 PROCEDURE — 71000045 HC DOSC ROUTINE RECOVERY EA ADD'L HR: Performed by: SURGERY

## 2021-09-20 PROCEDURE — 71000044 HC DOSC ROUTINE RECOVERY FIRST HOUR: Performed by: SURGERY

## 2021-09-20 PROCEDURE — 27201423 OPTIME MED/SURG SUP & DEVICES STERILE SUPPLY: Performed by: SURGERY

## 2021-09-20 DEVICE — MESH 3DMAX ANAT LG RIGHT 4X6IN: Type: IMPLANTABLE DEVICE | Site: INGUINAL | Status: FUNCTIONAL

## 2021-09-20 RX ORDER — ESMOLOL HYDROCHLORIDE 10 MG/ML
INJECTION INTRAVENOUS
Status: DISCONTINUED | OUTPATIENT
Start: 2021-09-20 | End: 2021-09-20

## 2021-09-20 RX ORDER — ONDANSETRON 2 MG/ML
4 INJECTION INTRAMUSCULAR; INTRAVENOUS ONCE AS NEEDED
Status: DISCONTINUED | OUTPATIENT
Start: 2021-09-20 | End: 2021-09-20 | Stop reason: HOSPADM

## 2021-09-20 RX ORDER — HYDRALAZINE HYDROCHLORIDE 20 MG/ML
10 INJECTION INTRAMUSCULAR; INTRAVENOUS ONCE
Status: COMPLETED | OUTPATIENT
Start: 2021-09-20 | End: 2021-09-20

## 2021-09-20 RX ORDER — FENTANYL CITRATE 50 UG/ML
25 INJECTION, SOLUTION INTRAMUSCULAR; INTRAVENOUS EVERY 5 MIN PRN
Status: DISCONTINUED | OUTPATIENT
Start: 2021-09-20 | End: 2021-09-20 | Stop reason: HOSPADM

## 2021-09-20 RX ORDER — ROCURONIUM BROMIDE 10 MG/ML
INJECTION, SOLUTION INTRAVENOUS
Status: DISCONTINUED | OUTPATIENT
Start: 2021-09-20 | End: 2021-09-20

## 2021-09-20 RX ORDER — CEFAZOLIN SODIUM 1 G/3ML
2 INJECTION, POWDER, FOR SOLUTION INTRAMUSCULAR; INTRAVENOUS
Status: COMPLETED | OUTPATIENT
Start: 2021-09-20 | End: 2021-09-20

## 2021-09-20 RX ORDER — DEXAMETHASONE SODIUM PHOSPHATE 4 MG/ML
INJECTION, SOLUTION INTRA-ARTICULAR; INTRALESIONAL; INTRAMUSCULAR; INTRAVENOUS; SOFT TISSUE
Status: DISCONTINUED | OUTPATIENT
Start: 2021-09-20 | End: 2021-09-20

## 2021-09-20 RX ORDER — LABETALOL HCL 20 MG/4 ML
10 SYRINGE (ML) INTRAVENOUS EVERY 4 HOURS PRN
Status: DISCONTINUED | OUTPATIENT
Start: 2021-09-20 | End: 2021-09-20 | Stop reason: HOSPADM

## 2021-09-20 RX ORDER — HYDROMORPHONE HYDROCHLORIDE 1 MG/ML
0.2 INJECTION, SOLUTION INTRAMUSCULAR; INTRAVENOUS; SUBCUTANEOUS EVERY 5 MIN PRN
Status: DISCONTINUED | OUTPATIENT
Start: 2021-09-20 | End: 2021-09-20 | Stop reason: HOSPADM

## 2021-09-20 RX ORDER — HYDROCODONE BITARTRATE AND ACETAMINOPHEN 5; 325 MG/1; MG/1
1 TABLET ORAL EVERY 4 HOURS PRN
Qty: 20 TABLET | Refills: 0 | Status: SHIPPED | OUTPATIENT
Start: 2021-09-20 | End: 2022-04-13

## 2021-09-20 RX ORDER — HYDROCODONE BITARTRATE AND ACETAMINOPHEN 5; 325 MG/1; MG/1
1 TABLET ORAL EVERY 4 HOURS PRN
Status: DISCONTINUED | OUTPATIENT
Start: 2021-09-20 | End: 2021-09-20 | Stop reason: HOSPADM

## 2021-09-20 RX ORDER — BUPIVACAINE HYDROCHLORIDE 5 MG/ML
INJECTION, SOLUTION EPIDURAL; INTRACAUDAL
Status: DISCONTINUED | OUTPATIENT
Start: 2021-09-20 | End: 2021-09-20 | Stop reason: HOSPADM

## 2021-09-20 RX ORDER — LABETALOL HYDROCHLORIDE 5 MG/ML
INJECTION, SOLUTION INTRAVENOUS
Status: DISCONTINUED | OUTPATIENT
Start: 2021-09-20 | End: 2021-09-20

## 2021-09-20 RX ORDER — LIDOCAINE HYDROCHLORIDE 20 MG/ML
INJECTION, SOLUTION EPIDURAL; INFILTRATION; INTRACAUDAL; PERINEURAL
Status: DISCONTINUED | OUTPATIENT
Start: 2021-09-20 | End: 2021-09-20

## 2021-09-20 RX ORDER — PROPOFOL 10 MG/ML
VIAL (ML) INTRAVENOUS
Status: DISCONTINUED | OUTPATIENT
Start: 2021-09-20 | End: 2021-09-20

## 2021-09-20 RX ORDER — FENTANYL CITRATE 50 UG/ML
INJECTION, SOLUTION INTRAMUSCULAR; INTRAVENOUS
Status: DISCONTINUED | OUTPATIENT
Start: 2021-09-20 | End: 2021-09-20

## 2021-09-20 RX ORDER — LIDOCAINE HYDROCHLORIDE 10 MG/ML
1 INJECTION, SOLUTION EPIDURAL; INFILTRATION; INTRACAUDAL; PERINEURAL ONCE
Status: COMPLETED | OUTPATIENT
Start: 2021-09-20 | End: 2021-09-20

## 2021-09-20 RX ORDER — ONDANSETRON 2 MG/ML
INJECTION INTRAMUSCULAR; INTRAVENOUS
Status: DISCONTINUED | OUTPATIENT
Start: 2021-09-20 | End: 2021-09-20

## 2021-09-20 RX ORDER — NEOSTIGMINE METHYLSULFATE 0.5 MG/ML
INJECTION, SOLUTION INTRAVENOUS
Status: DISCONTINUED | OUTPATIENT
Start: 2021-09-20 | End: 2021-09-20

## 2021-09-20 RX ORDER — LIDOCAINE HYDROCHLORIDE AND EPINEPHRINE 10; 10 MG/ML; UG/ML
INJECTION, SOLUTION INFILTRATION; PERINEURAL
Status: DISCONTINUED | OUTPATIENT
Start: 2021-09-20 | End: 2021-09-20 | Stop reason: HOSPADM

## 2021-09-20 RX ADMIN — HYDROCODONE BITARTRATE AND ACETAMINOPHEN 1 TABLET: 5; 325 TABLET ORAL at 09:09

## 2021-09-20 RX ADMIN — ROCURONIUM BROMIDE 50 MG: 10 INJECTION, SOLUTION INTRAVENOUS at 07:09

## 2021-09-20 RX ADMIN — PROPOFOL 30 MG: 10 INJECTION, EMULSION INTRAVENOUS at 08:09

## 2021-09-20 RX ADMIN — PROPOFOL 100 MG: 10 INJECTION, EMULSION INTRAVENOUS at 07:09

## 2021-09-20 RX ADMIN — GLYCOPYRROLATE 0.6 MG: 0.2 INJECTION, SOLUTION INTRAMUSCULAR; INTRAVITREAL at 09:09

## 2021-09-20 RX ADMIN — PROPOFOL 20 MG: 10 INJECTION, EMULSION INTRAVENOUS at 09:09

## 2021-09-20 RX ADMIN — ONDANSETRON 4 MG: 2 INJECTION INTRAMUSCULAR; INTRAVENOUS at 09:09

## 2021-09-20 RX ADMIN — DEXAMETHASONE SODIUM PHOSPHATE 4 MG: 4 INJECTION INTRA-ARTICULAR; INTRALESIONAL; INTRAMUSCULAR; INTRAVENOUS; SOFT TISSUE at 07:09

## 2021-09-20 RX ADMIN — PROPOFOL 10 MG: 10 INJECTION, EMULSION INTRAVENOUS at 09:09

## 2021-09-20 RX ADMIN — SODIUM CHLORIDE, SODIUM GLUCONATE, SODIUM ACETATE, POTASSIUM CHLORIDE, MAGNESIUM CHLORIDE, SODIUM PHOSPHATE, DIBASIC, AND POTASSIUM PHOSPHATE: .53; .5; .37; .037; .03; .012; .00082 INJECTION, SOLUTION INTRAVENOUS at 08:09

## 2021-09-20 RX ADMIN — LIDOCAINE HYDROCHLORIDE 100 MG: 20 INJECTION, SOLUTION EPIDURAL; INFILTRATION; INTRACAUDAL at 07:09

## 2021-09-20 RX ADMIN — ESMOLOL HYDROCHLORIDE 50 MG: 100 INJECTION, SOLUTION INTRAVENOUS at 07:09

## 2021-09-20 RX ADMIN — FENTANYL CITRATE 100 MCG: 50 INJECTION INTRAMUSCULAR; INTRAVENOUS at 07:09

## 2021-09-20 RX ADMIN — ROCURONIUM BROMIDE 10 MG: 10 INJECTION, SOLUTION INTRAVENOUS at 08:09

## 2021-09-20 RX ADMIN — NEOSTIGMINE METHYLSULFATE 4 MG: 0.5 INJECTION INTRAVENOUS at 09:09

## 2021-09-20 RX ADMIN — PROPOFOL 50 MG: 10 INJECTION, EMULSION INTRAVENOUS at 07:09

## 2021-09-20 RX ADMIN — LABETALOL HYDROCHLORIDE 20 MG: 5 INJECTION, SOLUTION INTRAVENOUS at 09:09

## 2021-09-20 RX ADMIN — SODIUM CHLORIDE: 0.9 INJECTION, SOLUTION INTRAVENOUS at 07:09

## 2021-09-20 RX ADMIN — HYDRALAZINE HYDROCHLORIDE 10 MG: 20 INJECTION INTRAMUSCULAR; INTRAVENOUS at 11:09

## 2021-09-20 RX ADMIN — CEFAZOLIN 2 G: 330 INJECTION, POWDER, FOR SOLUTION INTRAMUSCULAR; INTRAVENOUS at 07:09

## 2021-09-20 RX ADMIN — LIDOCAINE HYDROCHLORIDE 0.4 MG: 10 INJECTION, SOLUTION EPIDURAL; INFILTRATION; INTRACAUDAL at 06:09

## 2021-09-20 RX ADMIN — PROPOFOL 40 MG: 10 INJECTION, EMULSION INTRAVENOUS at 07:09

## 2021-09-20 RX ADMIN — PROPOFOL 150 MG: 10 INJECTION, EMULSION INTRAVENOUS at 07:09

## 2021-09-20 RX ADMIN — PROPOFOL 30 MG: 10 INJECTION, EMULSION INTRAVENOUS at 09:09

## 2021-10-04 ENCOUNTER — OFFICE VISIT (OUTPATIENT)
Dept: SURGERY | Facility: CLINIC | Age: 66
End: 2021-10-04
Payer: MEDICARE

## 2021-10-04 VITALS
WEIGHT: 171.31 LBS | BODY MASS INDEX: 26.89 KG/M2 | HEART RATE: 59 BPM | HEIGHT: 67 IN | DIASTOLIC BLOOD PRESSURE: 65 MMHG | SYSTOLIC BLOOD PRESSURE: 116 MMHG

## 2021-10-04 DIAGNOSIS — Z09 POSTOP CHECK: Primary | ICD-10-CM

## 2021-10-04 PROCEDURE — 1126F AMNT PAIN NOTED NONE PRSNT: CPT | Mod: CPTII,S$GLB,, | Performed by: SURGERY

## 2021-10-04 PROCEDURE — 3074F PR MOST RECENT SYSTOLIC BLOOD PRESSURE < 130 MM HG: ICD-10-PCS | Mod: CPTII,S$GLB,, | Performed by: SURGERY

## 2021-10-04 PROCEDURE — 1160F RVW MEDS BY RX/DR IN RCRD: CPT | Mod: CPTII,S$GLB,, | Performed by: SURGERY

## 2021-10-04 PROCEDURE — 3008F PR BODY MASS INDEX (BMI) DOCUMENTED: ICD-10-PCS | Mod: CPTII,S$GLB,, | Performed by: SURGERY

## 2021-10-04 PROCEDURE — 99999 PR PBB SHADOW E&M-EST. PATIENT-LVL III: CPT | Mod: PBBFAC,,, | Performed by: SURGERY

## 2021-10-04 PROCEDURE — 3078F PR MOST RECENT DIASTOLIC BLOOD PRESSURE < 80 MM HG: ICD-10-PCS | Mod: CPTII,S$GLB,, | Performed by: SURGERY

## 2021-10-04 PROCEDURE — 99024 POSTOP FOLLOW-UP VISIT: CPT | Mod: S$GLB,,, | Performed by: SURGERY

## 2021-10-04 PROCEDURE — 99024 PR POST-OP FOLLOW-UP VISIT: ICD-10-PCS | Mod: S$GLB,,, | Performed by: SURGERY

## 2021-10-04 PROCEDURE — 3288F PR FALLS RISK ASSESSMENT DOCUMENTED: ICD-10-PCS | Mod: CPTII,S$GLB,, | Performed by: SURGERY

## 2021-10-04 PROCEDURE — 1101F PT FALLS ASSESS-DOCD LE1/YR: CPT | Mod: CPTII,S$GLB,, | Performed by: SURGERY

## 2021-10-04 PROCEDURE — 99999 PR PBB SHADOW E&M-EST. PATIENT-LVL III: ICD-10-PCS | Mod: PBBFAC,,, | Performed by: SURGERY

## 2021-10-04 PROCEDURE — 3074F SYST BP LT 130 MM HG: CPT | Mod: CPTII,S$GLB,, | Performed by: SURGERY

## 2021-10-04 PROCEDURE — 1160F PR REVIEW ALL MEDS BY PRESCRIBER/CLIN PHARMACIST DOCUMENTED: ICD-10-PCS | Mod: CPTII,S$GLB,, | Performed by: SURGERY

## 2021-10-04 PROCEDURE — 1126F PR PAIN SEVERITY QUANTIFIED, NO PAIN PRESENT: ICD-10-PCS | Mod: CPTII,S$GLB,, | Performed by: SURGERY

## 2021-10-04 PROCEDURE — 3078F DIAST BP <80 MM HG: CPT | Mod: CPTII,S$GLB,, | Performed by: SURGERY

## 2021-10-04 PROCEDURE — 1101F PR PT FALLS ASSESS DOC 0-1 FALLS W/OUT INJ PAST YR: ICD-10-PCS | Mod: CPTII,S$GLB,, | Performed by: SURGERY

## 2021-10-04 PROCEDURE — 3288F FALL RISK ASSESSMENT DOCD: CPT | Mod: CPTII,S$GLB,, | Performed by: SURGERY

## 2021-10-04 PROCEDURE — 1159F MED LIST DOCD IN RCRD: CPT | Mod: CPTII,S$GLB,, | Performed by: SURGERY

## 2021-10-04 PROCEDURE — 1159F PR MEDICATION LIST DOCUMENTED IN MEDICAL RECORD: ICD-10-PCS | Mod: CPTII,S$GLB,, | Performed by: SURGERY

## 2021-10-04 PROCEDURE — 3008F BODY MASS INDEX DOCD: CPT | Mod: CPTII,S$GLB,, | Performed by: SURGERY

## 2021-10-26 ENCOUNTER — PES CALL (OUTPATIENT)
Dept: ADMINISTRATIVE | Facility: CLINIC | Age: 66
End: 2021-10-26
Payer: MEDICARE

## 2021-12-04 ENCOUNTER — HOSPITAL ENCOUNTER (EMERGENCY)
Facility: HOSPITAL | Age: 66
Discharge: HOME | End: 2021-12-04
Attending: EMERGENCY MEDICINE
Payer: MEDICARE

## 2021-12-04 VITALS
DIASTOLIC BLOOD PRESSURE: 94 MMHG | SYSTOLIC BLOOD PRESSURE: 173 MMHG | RESPIRATION RATE: 20 BRPM | WEIGHT: 160 LBS | OXYGEN SATURATION: 98 % | HEART RATE: 54 BPM | BODY MASS INDEX: 25.11 KG/M2 | HEIGHT: 67 IN | TEMPERATURE: 98 F

## 2021-12-04 DIAGNOSIS — M62.838 MUSCLE SPASM: Primary | ICD-10-CM

## 2021-12-04 LAB
ALBUMIN SERPL-MCNC: 3.6 G/DL (ref 3.3–5.5)
ALBUMIN SERPL-MCNC: 3.7 G/DL (ref 3.3–5.5)
ALP SERPL-CCNC: 44 U/L (ref 42–141)
ALP SERPL-CCNC: 55 U/L (ref 42–141)
APAP SERPL-MCNC: 17 UG/ML (ref 10–20)
BILIRUB SERPL-MCNC: 0.6 MG/DL (ref 0.2–1.6)
BILIRUB SERPL-MCNC: 0.6 MG/DL (ref 0.2–1.6)
BUN SERPL-MCNC: 20 MG/DL (ref 7–22)
CALCIUM SERPL-MCNC: 10.1 MG/DL (ref 8–10.3)
CHLORIDE SERPL-SCNC: 107 MMOL/L (ref 98–108)
CREAT SERPL-MCNC: 1.2 MG/DL (ref 0.6–1.2)
GLUCOSE SERPL-MCNC: 119 MG/DL (ref 73–118)
POC ALT (SGPT): 10 U/L (ref 10–47)
POC ALT (SGPT): 17 U/L (ref 10–47)
POC AMYLASE: 35 U/L (ref 14–97)
POC AST (SGOT): 31 U/L (ref 11–38)
POC AST (SGOT): 31 U/L (ref 11–38)
POC GGT: 22 U/L (ref 5–65)
POC TCO2: 25 MMOL/L (ref 18–33)
POCT GLUCOSE: 136 MG/DL (ref 70–110)
POTASSIUM BLD-SCNC: 3.1 MMOL/L (ref 3.6–5.1)
PROTEIN, POC: 7.5 G/DL (ref 6.4–8.1)
PROTEIN, POC: 7.5 G/DL (ref 6.4–8.1)
SODIUM BLD-SCNC: 142 MMOL/L (ref 128–145)

## 2021-12-04 PROCEDURE — 80143 DRUG ASSAY ACETAMINOPHEN: CPT | Performed by: EMERGENCY MEDICINE

## 2021-12-04 PROCEDURE — 85025 COMPLETE CBC W/AUTO DIFF WBC: CPT | Mod: ER

## 2021-12-04 PROCEDURE — 80053 COMPREHEN METABOLIC PANEL: CPT | Mod: ER

## 2021-12-04 PROCEDURE — 25000003 PHARM REV CODE 250: Mod: ER | Performed by: EMERGENCY MEDICINE

## 2021-12-04 PROCEDURE — 82150 ASSAY OF AMYLASE: CPT | Mod: ER

## 2021-12-04 PROCEDURE — 99284 EMERGENCY DEPT VISIT MOD MDM: CPT | Mod: 25,ER

## 2021-12-04 PROCEDURE — 82962 GLUCOSE BLOOD TEST: CPT | Mod: ER

## 2021-12-04 PROCEDURE — 96372 THER/PROPH/DIAG INJ SC/IM: CPT | Mod: ER

## 2021-12-04 PROCEDURE — 63600175 PHARM REV CODE 636 W HCPCS: Mod: ER | Performed by: EMERGENCY MEDICINE

## 2021-12-04 RX ORDER — MELOXICAM 15 MG/1
15 TABLET ORAL DAILY
Qty: 30 TABLET | Refills: 0 | Status: SHIPPED | OUTPATIENT
Start: 2021-12-04 | End: 2022-04-13

## 2021-12-04 RX ORDER — FAMOTIDINE 20 MG/1
20 TABLET, FILM COATED ORAL 2 TIMES DAILY
Qty: 60 TABLET | Refills: 0 | Status: SHIPPED | OUTPATIENT
Start: 2021-12-04 | End: 2022-04-13

## 2021-12-04 RX ORDER — KETOROLAC TROMETHAMINE 30 MG/ML
30 INJECTION, SOLUTION INTRAMUSCULAR; INTRAVENOUS
Status: COMPLETED | OUTPATIENT
Start: 2021-12-04 | End: 2021-12-04

## 2021-12-04 RX ORDER — BACLOFEN 10 MG/1
10 TABLET ORAL 3 TIMES DAILY
Qty: 30 TABLET | Refills: 0 | Status: SHIPPED | OUTPATIENT
Start: 2021-12-04 | End: 2022-04-13

## 2021-12-04 RX ORDER — GABAPENTIN 300 MG/1
300 CAPSULE ORAL 3 TIMES DAILY
Qty: 90 CAPSULE | Refills: 0 | Status: SHIPPED | OUTPATIENT
Start: 2021-12-04 | End: 2022-04-13

## 2021-12-04 RX ADMIN — ACETYLCYSTEINE 10900 MG: 200 INJECTION, SOLUTION INTRAVENOUS at 10:12

## 2021-12-04 RX ADMIN — KETOROLAC TROMETHAMINE 30 MG: 30 INJECTION, SOLUTION INTRAMUSCULAR; INTRAVENOUS at 08:12

## 2022-02-16 ENCOUNTER — PES CALL (OUTPATIENT)
Dept: ADMINISTRATIVE | Facility: CLINIC | Age: 67
End: 2022-02-16
Payer: MEDICARE

## 2022-03-18 DIAGNOSIS — I10 HYPERTENSION, UNSPECIFIED TYPE: ICD-10-CM

## 2022-03-18 RX ORDER — AMLODIPINE BESYLATE 10 MG/1
TABLET ORAL
Qty: 90 TABLET | Refills: 0 | Status: SHIPPED | OUTPATIENT
Start: 2022-03-18 | End: 2022-07-16

## 2022-03-18 NOTE — TELEPHONE ENCOUNTER
Care Due:                  Date            Visit Type   Department     Provider  --------------------------------------------------------------------------------                                EP -                              PRIMARY      [facility] INTERNAL  Last Visit: 04-      [facility] ([facility]   MEDICINE       MEREL CLARKE  Next Visit: None Scheduled  None         None Found                                                            Last  Test          Frequency    Reason                     Performed    Due Date  --------------------------------------------------------------------------------    Office Visit  12 months..  traZODone................  04- 04-    Powered by [employer]. Reference number: [id number].   3/18/2022 6:40:48 AM CDT

## 2022-03-18 NOTE — TELEPHONE ENCOUNTER
This Rx Request does not qualify for assessment with the [facility]   Please review protocol details and the Care Due Message for extra clinical information    Reasons Rx Request may be deferred:  Labs/Vitals abnormal    Note composed:7:26 AM 03/18/2022

## 2022-04-13 ENCOUNTER — OFFICE VISIT (OUTPATIENT)
Dept: FAMILY MEDICINE | Facility: CLINIC | Age: 67
End: 2022-04-13
Payer: MEDICARE

## 2022-04-13 VITALS
DIASTOLIC BLOOD PRESSURE: 70 MMHG | OXYGEN SATURATION: 97 % | BODY MASS INDEX: 25.06 KG/M2 | TEMPERATURE: 98 F | HEIGHT: 67 IN | SYSTOLIC BLOOD PRESSURE: 138 MMHG | HEART RATE: 50 BPM

## 2022-04-13 DIAGNOSIS — Z12.5 ENCOUNTER FOR SCREENING FOR MALIGNANT NEOPLASM OF PROSTATE: ICD-10-CM

## 2022-04-13 DIAGNOSIS — M54.32 BILATERAL SCIATICA: ICD-10-CM

## 2022-04-13 DIAGNOSIS — Z00.00 ROUTINE PHYSICAL EXAMINATION: Primary | ICD-10-CM

## 2022-04-13 DIAGNOSIS — E66.01 SEVERE OBESITY (BMI 35.0-39.9) WITH COMORBIDITY: ICD-10-CM

## 2022-04-13 DIAGNOSIS — E78.5 HYPERLIPIDEMIA, UNSPECIFIED HYPERLIPIDEMIA TYPE: ICD-10-CM

## 2022-04-13 DIAGNOSIS — M54.31 BILATERAL SCIATICA: ICD-10-CM

## 2022-04-13 DIAGNOSIS — R79.9 ABNORMAL FINDING OF BLOOD CHEMISTRY, UNSPECIFIED: ICD-10-CM

## 2022-04-13 DIAGNOSIS — I10 ESSENTIAL HYPERTENSION: Chronic | ICD-10-CM

## 2022-04-13 PROBLEM — S67.10XD: Status: RESOLVED | Noted: 2018-10-29 | Resolved: 2022-04-13

## 2022-04-13 PROBLEM — E87.6 HYPOKALEMIA: Status: RESOLVED | Noted: 2019-02-28 | Resolved: 2022-04-13

## 2022-04-13 PROBLEM — E78.2 MIXED HYPERLIPIDEMIA: Status: RESOLVED | Noted: 2018-07-03 | Resolved: 2022-04-13

## 2022-04-13 PROBLEM — R73.03 PRE-DIABETES: Chronic | Status: ACTIVE | Noted: 2018-07-03

## 2022-04-13 PROCEDURE — 3008F BODY MASS INDEX DOCD: CPT | Mod: CPTII,S$GLB,, | Performed by: FAMILY MEDICINE

## 2022-04-13 PROCEDURE — 1101F PR PT FALLS ASSESS DOC 0-1 FALLS W/OUT INJ PAST YR: ICD-10-PCS | Mod: CPTII,S$GLB,, | Performed by: FAMILY MEDICINE

## 2022-04-13 PROCEDURE — 3288F PR FALLS RISK ASSESSMENT DOCUMENTED: ICD-10-PCS | Mod: CPTII,S$GLB,, | Performed by: FAMILY MEDICINE

## 2022-04-13 PROCEDURE — 99204 OFFICE O/P NEW MOD 45 MIN: CPT | Mod: 25,S$GLB,, | Performed by: FAMILY MEDICINE

## 2022-04-13 PROCEDURE — 96372 THER/PROPH/DIAG INJ SC/IM: CPT | Mod: S$GLB,,, | Performed by: FAMILY MEDICINE

## 2022-04-13 PROCEDURE — 3288F FALL RISK ASSESSMENT DOCD: CPT | Mod: CPTII,S$GLB,, | Performed by: FAMILY MEDICINE

## 2022-04-13 PROCEDURE — 99499 RISK ADDL DX/OHS AUDIT: ICD-10-PCS | Mod: S$GLB,,, | Performed by: FAMILY MEDICINE

## 2022-04-13 PROCEDURE — 1101F PT FALLS ASSESS-DOCD LE1/YR: CPT | Mod: CPTII,S$GLB,, | Performed by: FAMILY MEDICINE

## 2022-04-13 PROCEDURE — 3075F SYST BP GE 130 - 139MM HG: CPT | Mod: CPTII,S$GLB,, | Performed by: FAMILY MEDICINE

## 2022-04-13 PROCEDURE — 3008F PR BODY MASS INDEX (BMI) DOCUMENTED: ICD-10-PCS | Mod: CPTII,S$GLB,, | Performed by: FAMILY MEDICINE

## 2022-04-13 PROCEDURE — 1159F PR MEDICATION LIST DOCUMENTED IN MEDICAL RECORD: ICD-10-PCS | Mod: CPTII,S$GLB,, | Performed by: FAMILY MEDICINE

## 2022-04-13 PROCEDURE — 99999 PR PBB SHADOW E&M-EST. PATIENT-LVL III: ICD-10-PCS | Mod: PBBFAC,,, | Performed by: FAMILY MEDICINE

## 2022-04-13 PROCEDURE — 99999 PR PBB SHADOW E&M-EST. PATIENT-LVL III: CPT | Mod: PBBFAC,,, | Performed by: FAMILY MEDICINE

## 2022-04-13 PROCEDURE — 1159F MED LIST DOCD IN RCRD: CPT | Mod: CPTII,S$GLB,, | Performed by: FAMILY MEDICINE

## 2022-04-13 PROCEDURE — 3078F DIAST BP <80 MM HG: CPT | Mod: CPTII,S$GLB,, | Performed by: FAMILY MEDICINE

## 2022-04-13 PROCEDURE — 99499 UNLISTED E&M SERVICE: CPT | Mod: S$GLB,,, | Performed by: FAMILY MEDICINE

## 2022-04-13 PROCEDURE — 3075F PR MOST RECENT SYSTOLIC BLOOD PRESS GE 130-139MM HG: ICD-10-PCS | Mod: CPTII,S$GLB,, | Performed by: FAMILY MEDICINE

## 2022-04-13 PROCEDURE — 1125F AMNT PAIN NOTED PAIN PRSNT: CPT | Mod: CPTII,S$GLB,, | Performed by: FAMILY MEDICINE

## 2022-04-13 PROCEDURE — 99204 PR OFFICE/OUTPT VISIT, NEW, LEVL IV, 45-59 MIN: ICD-10-PCS | Mod: 25,S$GLB,, | Performed by: FAMILY MEDICINE

## 2022-04-13 PROCEDURE — 96372 PR INJECTION,THERAP/PROPH/DIAG2ST, IM OR SUBCUT: ICD-10-PCS | Mod: S$GLB,,, | Performed by: FAMILY MEDICINE

## 2022-04-13 PROCEDURE — 1125F PR PAIN SEVERITY QUANTIFIED, PAIN PRESENT: ICD-10-PCS | Mod: CPTII,S$GLB,, | Performed by: FAMILY MEDICINE

## 2022-04-13 PROCEDURE — 3078F PR MOST RECENT DIASTOLIC BLOOD PRESSURE < 80 MM HG: ICD-10-PCS | Mod: CPTII,S$GLB,, | Performed by: FAMILY MEDICINE

## 2022-04-13 RX ORDER — NAPROXEN 500 MG/1
500 TABLET ORAL 2 TIMES DAILY PRN
Qty: 30 TABLET | Refills: 0 | Status: SHIPPED | OUTPATIENT
Start: 2022-04-13 | End: 2022-06-14 | Stop reason: SDUPTHER

## 2022-04-13 RX ORDER — KETOROLAC TROMETHAMINE 30 MG/ML
30 INJECTION, SOLUTION INTRAMUSCULAR; INTRAVENOUS ONCE
Status: COMPLETED | OUTPATIENT
Start: 2022-04-13 | End: 2022-04-13

## 2022-04-13 RX ADMIN — KETOROLAC TROMETHAMINE 30 MG: 30 INJECTION, SOLUTION INTRAMUSCULAR; INTRAVENOUS at 09:04

## 2022-04-13 NOTE — PROGRESS NOTES
Patient tolerated ketorolac 30 mg  injection well, instructed to remain in clinic for 15 mins.to monitor for allergic reaction. Verbalized understanding.

## 2022-04-13 NOTE — PROGRESS NOTES
[facility]  Progress Note    SUBJECTIVE:     Chief Complaint   Patient presents with    Pain     Sciatic pain        HPI   Isreal Randolph  is a 66 y.o. male here for physical exam. Also has other issues with his sciatica which will be addressed below. Patient has no other new complaints/problems at this time.      Review of patient's allergies indicates:   Allergen Reactions    Lactose intolerance  [lactase]      Other reaction(s): Diarrhea    Penicillins Other (See Comments)     Swelling of lymph nodes       Past Medical History:   Diagnosis Date    Allergy     Back pain     Hypertension     Mixed hyperlipidemia 7/3/2018    Obesity     Reflux     Sleep apnea 6/21/2013     Past Surgical History:   Procedure Laterality Date    CHOLECYSTECTOMY  2012    HERNIA REPAIR      RHINOPLASTY       Family History   Problem Relation Age of Onset    Cancer Mother 65        exposure related    Heart disease Father     Colon cancer Neg Hx     Esophageal cancer Neg Hx     Diabetes Neg Hx      Social History     Tobacco Use    Smoking status: Never Smoker    Smokeless tobacco: Never Used   Substance Use Topics    Alcohol use: Yes     Alcohol/week: 3.0 standard drinks     Types: 3 Cans of beer per week     Comment: 3 times per week    Drug use: No        Review of Systems   Constitutional: Negative for chills, diaphoresis and fever.   HENT: Negative for congestion, ear pain and sore throat.    Eyes: Negative for photophobia and discharge.   Respiratory: Negative for cough, shortness of breath and wheezing.    Cardiovascular: Negative for chest pain and palpitations.   Gastrointestinal: Negative for abdominal pain, constipation, diarrhea, nausea and vomiting.   Genitourinary: Negative for dysuria and hematuria.   Musculoskeletal: Positive for back pain (with radiating down both legs). Negative for myalgias.   Skin: Negative for itching and rash.   Neurological: Negative for dizziness, sensory change,  focal weakness, weakness and headaches.   All other systems reviewed and are negative.    OBJECTIVE:     Vitals:    04/13/22 0906   BP: 138/70   Pulse: (!) 50   Temp: 98.1 °F (36.7 °C)     Body mass index is 25.06 kg/m².    Physical Exam  Constitutional:       General: He is not in acute distress.     Appearance: He is not diaphoretic.   HENT:      Head: Normocephalic and atraumatic.      Right Ear: Tympanic membrane and ear canal normal. No hemotympanum. Tympanic membrane is not perforated, erythematous or bulging.      Left Ear: Tympanic membrane and ear canal normal. No hemotympanum. Tympanic membrane is not perforated, erythematous or bulging.      Mouth/Throat:      Pharynx: No oropharyngeal exudate.   Eyes:      Conjunctiva/sclera: Conjunctivae normal.      Pupils: Pupils are equal, round, and reactive to light.   Neck:      Thyroid: No thyromegaly.   Cardiovascular:      Rate and Rhythm: Normal rate and regular rhythm.      Heart sounds: Normal heart sounds. No murmur heard.    No friction rub. No gallop.   Pulmonary:      Effort: Pulmonary effort is normal. No respiratory distress.      Breath sounds: Normal breath sounds. No wheezing or rales.   Abdominal:      General: Bowel sounds are normal. There is no distension.      Palpations: Abdomen is soft.      Tenderness: There is no abdominal tenderness. There is no guarding or rebound.   Musculoskeletal:         General: Tenderness (to palpation of both piriformis muscles) present. Normal range of motion.   Lymphadenopathy:      Cervical: No cervical adenopathy.   Skin:     General: Skin is warm.      Findings: No erythema or rash.   Neurological:      Mental Status: He is alert and oriented to person, place, and time.         Old records were reviewed. Labs and/or images were independently reviewed.    ASSESSMENT     1. Routine physical examination    2. Hyperlipidemia, unspecified hyperlipidemia type    3. Bilateral sciatica    4. Abnormal finding of blood  chemistry, unspecified     5. Encounter for screening for malignant neoplasm of prostate     6. Essential hypertension    7. Severe obesity (BMI 35.0-39.9) with comorbidity        PLAN:     Routine physical examination  -     CBC Auto Differential; Future  -     Comprehensive Metabolic Panel; Future  -     Hemoglobin A1C; Future  -     Lipid Panel; Future  -     TSH; Future  -     T4, Free; Future  -     PSA, Screening; Future; Expected date: 04/13/2022  -     We briefly discussed diet, exercise, and routine preventive exams. All questions and comments addressed.    RTC PRJOSE LUIS Tellez MD  04/13/2022 9:25 AM    Additional Evaluation & Management issues:    In addition to today's Annual Physical, patient has other medical issues that need to be addressed, as well as their associated prescription management that is separate from today's physical, as documented separately below.     HPI  Pt c/o having b/l sciatica. Has been going on for months. Certain positions make it worst. No falls/trauma. It can radiate down either side which causes him to limp.    Review of Systems   Constitutional: Negative for chills, diaphoresis and fever.   HENT: Negative for congestion, ear pain and sore throat.    Eyes: Negative for photophobia and discharge.   Respiratory: Negative for cough, shortness of breath and wheezing.    Cardiovascular: Negative for chest pain and palpitations.   Gastrointestinal: Negative for abdominal pain, constipation, diarrhea, nausea and vomiting.   Genitourinary: Negative for dysuria and hematuria.   Musculoskeletal: Positive for back pain (with radiating down both legs). Negative for myalgias.   Skin: Negative for itching and rash.   Neurological: Negative for dizziness, sensory change, focal weakness, weakness and headaches.   All other systems reviewed and are negative.    Physical Exam  Constitutional:       General: He is not in acute distress.     Appearance: He is not diaphoretic.   HENT:       Head: Normocephalic and atraumatic.      Right Ear: Tympanic membrane and ear canal normal. No hemotympanum. Tympanic membrane is not perforated, erythematous or bulging.      Left Ear: Tympanic membrane and ear canal normal. No hemotympanum. Tympanic membrane is not perforated, erythematous or bulging.      Mouth/Throat:      Pharynx: No oropharyngeal exudate.   Eyes:      Conjunctiva/sclera: Conjunctivae normal.      Pupils: Pupils are equal, round, and reactive to light.   Neck:      Thyroid: No thyromegaly.   Cardiovascular:      Rate and Rhythm: Normal rate and regular rhythm.      Heart sounds: Normal heart sounds. No murmur heard.    No friction rub. No gallop.   Pulmonary:      Effort: Pulmonary effort is normal. No respiratory distress.      Breath sounds: Normal breath sounds. No wheezing or rales.   Abdominal:      General: Bowel sounds are normal. There is no distension.      Palpations: Abdomen is soft.      Tenderness: There is no abdominal tenderness. There is no guarding or rebound.   Musculoskeletal:         General: Tenderness (to palpation of both piriformis muscles) present. Normal range of motion.   Lymphadenopathy:      Cervical: No cervical adenopathy.   Skin:     General: Skin is warm.      Findings: No erythema or rash.   Neurological:      Mental Status: He is alert and oriented to person, place, and time.     Hyperlipidemia, unspecified hyperlipidemia type/Severe obesity (BMI 35.0-39.9) with comorbidity  -     CBC Auto Differential; Future  -     Comprehensive Metabolic Panel; Future  -     Hemoglobin A1C; Future  -     Lipid Panel; Future  -     TSH; Future  -     T4, Free; Future  -     Counseled patient about healthy diet, exercise habits, and to increase physical activity.    Bilateral sciatica  -     X-Ray Lumbar Complete Including Flex And Ext; Future; Expected date: 04/13/2022  -     Start naproxen (NAPROSYN) 500 MG tablet; Take 1 tablet (500 mg total) by mouth 2 (two) times daily  as needed.  Dispense: 30 tablet; Refill: 0  -     Ambulatory referral/consult to Physical/Occupational Therapy; Future; Expected date: 04/20/2022  -     ketorolac injection 30 mg  -     NSAIDs, refer to PT for further evaluation/treatment. Consider MRI lumbar if unresolved.    Essential hypertension   -     Stable. Continue current regimen.    RTC PRN

## 2022-06-14 ENCOUNTER — OFFICE VISIT (OUTPATIENT)
Dept: FAMILY MEDICINE | Facility: CLINIC | Age: 67
End: 2022-06-14
Payer: MEDICARE

## 2022-06-14 VITALS
BODY MASS INDEX: 24.29 KG/M2 | DIASTOLIC BLOOD PRESSURE: 60 MMHG | SYSTOLIC BLOOD PRESSURE: 110 MMHG | HEIGHT: 67 IN | HEART RATE: 48 BPM | TEMPERATURE: 99 F | WEIGHT: 154.75 LBS | OXYGEN SATURATION: 96 %

## 2022-06-14 DIAGNOSIS — M54.31 RIGHT SIDED SCIATICA: Primary | ICD-10-CM

## 2022-06-14 DIAGNOSIS — I10 ESSENTIAL HYPERTENSION: Chronic | ICD-10-CM

## 2022-06-14 DIAGNOSIS — M54.31 BILATERAL SCIATICA: ICD-10-CM

## 2022-06-14 DIAGNOSIS — M54.32 BILATERAL SCIATICA: ICD-10-CM

## 2022-06-14 DIAGNOSIS — Z12.11 ENCOUNTER FOR SCREENING FOR MALIGNANT NEOPLASM OF COLON: ICD-10-CM

## 2022-06-14 DIAGNOSIS — R73.03 PRE-DIABETES: Chronic | ICD-10-CM

## 2022-06-14 PROCEDURE — 3288F PR FALLS RISK ASSESSMENT DOCUMENTED: ICD-10-PCS | Mod: CPTII,S$GLB,, | Performed by: FAMILY MEDICINE

## 2022-06-14 PROCEDURE — 99999 PR PBB SHADOW E&M-EST. PATIENT-LVL III: CPT | Mod: PBBFAC,,, | Performed by: FAMILY MEDICINE

## 2022-06-14 PROCEDURE — 3288F FALL RISK ASSESSMENT DOCD: CPT | Mod: CPTII,S$GLB,, | Performed by: FAMILY MEDICINE

## 2022-06-14 PROCEDURE — 1101F PT FALLS ASSESS-DOCD LE1/YR: CPT | Mod: CPTII,S$GLB,, | Performed by: FAMILY MEDICINE

## 2022-06-14 PROCEDURE — 1126F AMNT PAIN NOTED NONE PRSNT: CPT | Mod: CPTII,S$GLB,, | Performed by: FAMILY MEDICINE

## 2022-06-14 PROCEDURE — 96372 THER/PROPH/DIAG INJ SC/IM: CPT | Mod: S$GLB,,, | Performed by: FAMILY MEDICINE

## 2022-06-14 PROCEDURE — 99214 OFFICE O/P EST MOD 30 MIN: CPT | Mod: 25,S$GLB,, | Performed by: FAMILY MEDICINE

## 2022-06-14 PROCEDURE — 3008F PR BODY MASS INDEX (BMI) DOCUMENTED: ICD-10-PCS | Mod: CPTII,S$GLB,, | Performed by: FAMILY MEDICINE

## 2022-06-14 PROCEDURE — 3074F SYST BP LT 130 MM HG: CPT | Mod: CPTII,S$GLB,, | Performed by: FAMILY MEDICINE

## 2022-06-14 PROCEDURE — 96372 PR INJECTION,THERAP/PROPH/DIAG2ST, IM OR SUBCUT: ICD-10-PCS | Mod: S$GLB,,, | Performed by: FAMILY MEDICINE

## 2022-06-14 PROCEDURE — 1101F PR PT FALLS ASSESS DOC 0-1 FALLS W/OUT INJ PAST YR: ICD-10-PCS | Mod: CPTII,S$GLB,, | Performed by: FAMILY MEDICINE

## 2022-06-14 PROCEDURE — 99214 PR OFFICE/OUTPT VISIT, EST, LEVL IV, 30-39 MIN: ICD-10-PCS | Mod: 25,S$GLB,, | Performed by: FAMILY MEDICINE

## 2022-06-14 PROCEDURE — 1159F PR MEDICATION LIST DOCUMENTED IN MEDICAL RECORD: ICD-10-PCS | Mod: CPTII,S$GLB,, | Performed by: FAMILY MEDICINE

## 2022-06-14 PROCEDURE — 1126F PR PAIN SEVERITY QUANTIFIED, NO PAIN PRESENT: ICD-10-PCS | Mod: CPTII,S$GLB,, | Performed by: FAMILY MEDICINE

## 2022-06-14 PROCEDURE — 3008F BODY MASS INDEX DOCD: CPT | Mod: CPTII,S$GLB,, | Performed by: FAMILY MEDICINE

## 2022-06-14 PROCEDURE — 1159F MED LIST DOCD IN RCRD: CPT | Mod: CPTII,S$GLB,, | Performed by: FAMILY MEDICINE

## 2022-06-14 PROCEDURE — 3078F DIAST BP <80 MM HG: CPT | Mod: CPTII,S$GLB,, | Performed by: FAMILY MEDICINE

## 2022-06-14 PROCEDURE — 3074F PR MOST RECENT SYSTOLIC BLOOD PRESSURE < 130 MM HG: ICD-10-PCS | Mod: CPTII,S$GLB,, | Performed by: FAMILY MEDICINE

## 2022-06-14 PROCEDURE — 99999 PR PBB SHADOW E&M-EST. PATIENT-LVL III: ICD-10-PCS | Mod: PBBFAC,,, | Performed by: FAMILY MEDICINE

## 2022-06-14 PROCEDURE — 3078F PR MOST RECENT DIASTOLIC BLOOD PRESSURE < 80 MM HG: ICD-10-PCS | Mod: CPTII,S$GLB,, | Performed by: FAMILY MEDICINE

## 2022-06-14 RX ORDER — NAPROXEN 500 MG/1
500 TABLET ORAL 2 TIMES DAILY PRN
Qty: 30 TABLET | Refills: 0 | Status: SHIPPED | OUTPATIENT
Start: 2022-06-14 | End: 2022-07-29

## 2022-06-14 RX ORDER — KETOROLAC TROMETHAMINE 30 MG/ML
30 INJECTION, SOLUTION INTRAMUSCULAR; INTRAVENOUS ONCE
Status: COMPLETED | OUTPATIENT
Start: 2022-06-14 | End: 2022-06-14

## 2022-06-14 RX ADMIN — KETOROLAC TROMETHAMINE 30 MG: 30 INJECTION, SOLUTION INTRAMUSCULAR; INTRAVENOUS at 03:06

## 2022-06-14 NOTE — PROGRESS NOTES
[facility]  Progress Note    SUBJECTIVE:     Chief Complaint   Patient presents with    Follow-up       HPI   Isreal Randolph  is a 66 y.o. male here for follow-up of his chronic conditions. Also has issues with R sided sciatica. Rates pain as moderate. Patient has no other new complaints/problems at this time.      Review of patient's allergies indicates:   Allergen Reactions    Lactose intolerance  [lactase]      Other reaction(s): Diarrhea    Penicillins Other (See Comments)     Swelling of lymph nodes       Past Medical History:   Diagnosis Date    Allergy     Back pain     Hypertension     Mixed hyperlipidemia 7/3/2018    Obesity     Reflux     Sleep apnea 6/21/2013     Past Surgical History:   Procedure Laterality Date    CHOLECYSTECTOMY  2012    HERNIA REPAIR      RHINOPLASTY       Family History   Problem Relation Age of Onset    Cancer Mother 65        exposure related    Heart disease Father     Colon cancer Neg Hx     Esophageal cancer Neg Hx     Diabetes Neg Hx      Social History     Tobacco Use    Smoking status: Never Smoker    Smokeless tobacco: Never Used   Substance Use Topics    Alcohol use: Yes     Alcohol/week: 3.0 standard drinks     Types: 3 Cans of beer per week     Comment: 3 times per week    Drug use: No        Review of Systems   Constitutional: Negative for chills, fever and malaise/fatigue.   Respiratory: Negative.    Cardiovascular: Negative.    Gastrointestinal: Negative.    Genitourinary: Negative.  Negative for dysuria, flank pain, frequency, hematuria and urgency.   Musculoskeletal: Positive for back pain (lower buttock pain (right)). Negative for falls and joint pain.   Neurological: Negative for dizziness and weakness.        + sciatica     OBJECTIVE:     Vitals:    06/14/22 1437   BP: 110/60   Pulse: (!) 48   Temp: 98.5 °F (36.9 °C)     Body mass index is 24.24 kg/m².    Physical Exam  Constitutional:       General: He is in acute distress (mild).       Appearance: He is not diaphoretic.   HENT:      Head: Normocephalic and atraumatic.   Musculoskeletal:         General: Tenderness (to palpation of right lower buttocks, with increased pain on internal rotation of hips) present. Normal range of motion.   Skin:     General: Skin is warm.      Findings: No erythema or rash.   Neurological:      Mental Status: He is alert and oriented to person, place, and time.         Old records were reviewed. Labs and/or images were independently reviewed.    ASSESSMENT     1. Right sided sciatica    2. Bilateral sciatica    3. Essential hypertension    4. Pre-diabetes    5. Encounter for screening for malignant neoplasm of colon        PLAN:     Right sided sciatica  -     Ambulatory referral/consult to Physical/Occupational Therapy; Future; Expected date: 06/21/2022  -     ketorolac injection 30 mg  -     Patient counseled on good posture and stretching exercises. Continue to place ice packs on affected areas 3-4 times daily. Take medications as prescribed. Instructed patient to call MD if symptoms persist or worsen.    Bilateral sciatica  -     naproxen (NAPROSYN) 500 MG tablet; Take 1 tablet (500 mg total) by mouth 2 (two) times daily as needed.  Dispense: 30 tablet; Refill: 0  -     Ambulatory referral/consult to Physical/Occupational Therapy; Future; Expected date: 06/21/2022    Essential hypertension   -     Stable. Continue current regimen.    Pre-diabetes   -     Counseled patient about healthy diet, exercise habits, and to increase physical activity.\    Encounter for screening for malignant neoplasm of colon  -     Fecal Immunochemical Test (iFOBT); Future; Expected date: 06/14/2022      RTC MALCOLM Tellez MD  06/14/2022 2:52 PM

## 2022-06-17 ENCOUNTER — HOSPITAL ENCOUNTER (EMERGENCY)
Facility: HOSPITAL | Age: 67
Discharge: HOME | End: 2022-06-17
Attending: EMERGENCY MEDICINE
Payer: MEDICARE

## 2022-06-17 VITALS
SYSTOLIC BLOOD PRESSURE: 178 MMHG | TEMPERATURE: 99 F | HEART RATE: 65 BPM | OXYGEN SATURATION: 98 % | DIASTOLIC BLOOD PRESSURE: 89 MMHG | WEIGHT: 160 LBS | BODY MASS INDEX: 25.06 KG/M2 | RESPIRATION RATE: 16 BRPM

## 2022-06-17 DIAGNOSIS — K52.9 GASTROENTERITIS: Primary | ICD-10-CM

## 2022-06-17 DIAGNOSIS — E87.6 HYPOKALEMIA: ICD-10-CM

## 2022-06-17 DIAGNOSIS — R42 LIGHTHEADED: ICD-10-CM

## 2022-06-17 DIAGNOSIS — R11.10 VOMITING AND DIARRHEA: ICD-10-CM

## 2022-06-17 DIAGNOSIS — R19.7 VOMITING AND DIARRHEA: ICD-10-CM

## 2022-06-17 LAB
ALBUMIN SERPL-MCNC: 3.9 G/DL (ref 3.3–5.5)
ALBUMIN SERPL-MCNC: 4.1 G/DL (ref 3.3–5.5)
ALP SERPL-CCNC: 53 U/L (ref 42–141)
ALP SERPL-CCNC: 61 U/L (ref 42–141)
BILIRUB SERPL-MCNC: 0.6 MG/DL (ref 0.2–1.6)
BILIRUB SERPL-MCNC: 0.6 MG/DL (ref 0.2–1.6)
BILIRUBIN, POC UA: NEGATIVE
BLOOD, POC UA: NEGATIVE
BUN SERPL-MCNC: 23 MG/DL (ref 7–22)
CALCIUM SERPL-MCNC: 10.3 MG/DL (ref 8–10.3)
CHLORIDE SERPL-SCNC: 103 MMOL/L (ref 98–108)
CLARITY, POC UA: CLEAR
COLOR, POC UA: YELLOW
CREAT SERPL-MCNC: 1.4 MG/DL (ref 0.6–1.2)
CTP QC/QA: YES
GLUCOSE SERPL-MCNC: 104 MG/DL (ref 73–118)
GLUCOSE, POC UA: NEGATIVE
HCT, POC: NORMAL
HGB, POC: NORMAL (ref 14–18)
INFLUENZA A ANTIGEN, POC: NEGATIVE
INFLUENZA B ANTIGEN, POC: NEGATIVE
KETONES, POC UA: ABNORMAL
LEUKOCYTE EST, POC UA: NEGATIVE
MCH, POC: NORMAL
MCHC, POC: NORMAL
MCV, POC: NORMAL
MPV, POC: NORMAL
NITRITE, POC UA: NEGATIVE
PH UR STRIP: 6 [PH]
POC ALT (SGPT): 26 U/L (ref 10–47)
POC ALT (SGPT): 28 U/L (ref 10–47)
POC AMYLASE: 35 U/L (ref 14–97)
POC AST (SGOT): 35 U/L (ref 11–38)
POC AST (SGOT): 36 U/L (ref 11–38)
POC GGT: 22 U/L (ref 5–65)
POC PLATELET COUNT: NORMAL
POC TCO2: 27 MMOL/L (ref 18–33)
POTASSIUM BLD-SCNC: 3.1 MMOL/L (ref 3.6–5.1)
PROTEIN, POC UA: ABNORMAL
PROTEIN, POC: 7.1 G/DL (ref 6.4–8.1)
PROTEIN, POC: 7.2 G/DL (ref 6.4–8.1)
RBC, POC: NORMAL
RDW, POC: NORMAL
SARS-COV-2 RDRP RESP QL NAA+PROBE: NEGATIVE
SODIUM BLD-SCNC: 145 MMOL/L (ref 128–145)
SPECIFIC GRAVITY, POC UA: 1.02
UROBILINOGEN, POC UA: 0.2 E.U./DL
WBC, POC: NORMAL

## 2022-06-17 PROCEDURE — 80053 COMPREHEN METABOLIC PANEL: CPT | Mod: ER

## 2022-06-17 PROCEDURE — 81003 URINALYSIS AUTO W/O SCOPE: CPT | Mod: ER

## 2022-06-17 PROCEDURE — U0002 COVID-19 LAB TEST NON-CDC: HCPCS | Mod: ER | Performed by: PHYSICIAN ASSISTANT

## 2022-06-17 PROCEDURE — 85025 COMPLETE CBC W/AUTO DIFF WBC: CPT | Mod: ER

## 2022-06-17 PROCEDURE — 93010 EKG 12-LEAD: ICD-10-PCS | Mod: ,,, | Performed by: INTERNAL MEDICINE

## 2022-06-17 PROCEDURE — 93010 ELECTROCARDIOGRAM REPORT: CPT | Mod: ,,, | Performed by: INTERNAL MEDICINE

## 2022-06-17 PROCEDURE — 87804 INFLUENZA ASSAY W/OPTIC: CPT | Mod: ER

## 2022-06-17 PROCEDURE — 96361 HYDRATE IV INFUSION ADD-ON: CPT | Mod: ER

## 2022-06-17 PROCEDURE — 93005 ELECTROCARDIOGRAM TRACING: CPT | Mod: ER

## 2022-06-17 PROCEDURE — 99284 EMERGENCY DEPT VISIT MOD MDM: CPT | Mod: 25,ER

## 2022-06-17 PROCEDURE — 96374 THER/PROPH/DIAG INJ IV PUSH: CPT | Mod: ER

## 2022-06-17 PROCEDURE — 82150 ASSAY OF AMYLASE: CPT | Mod: ER

## 2022-06-17 PROCEDURE — 63600175 PHARM REV CODE 636 W HCPCS: Mod: ER | Performed by: PHYSICIAN ASSISTANT

## 2022-06-17 PROCEDURE — 25000003 PHARM REV CODE 250: Mod: ER | Performed by: PHYSICIAN ASSISTANT

## 2022-06-17 RX ORDER — ONDANSETRON 2 MG/ML
4 INJECTION INTRAMUSCULAR; INTRAVENOUS
Status: COMPLETED | OUTPATIENT
Start: 2022-06-17 | End: 2022-06-17

## 2022-06-17 RX ORDER — PROMETHAZINE HYDROCHLORIDE 25 MG/1
25 TABLET ORAL EVERY 6 HOURS PRN
Qty: 20 TABLET | Refills: 0 | Status: SHIPPED | OUTPATIENT
Start: 2022-06-17 | End: 2022-06-22

## 2022-06-17 RX ORDER — POTASSIUM CHLORIDE 20 MEQ/1
60 TABLET, EXTENDED RELEASE ORAL
Status: COMPLETED | OUTPATIENT
Start: 2022-06-17 | End: 2022-06-17

## 2022-06-17 RX ORDER — ACETAMINOPHEN 500 MG
500 TABLET ORAL EVERY 4 HOURS PRN
Qty: 20 TABLET | Refills: 0 | Status: SHIPPED | OUTPATIENT
Start: 2022-06-17 | End: 2022-06-22

## 2022-06-17 RX ORDER — ONDANSETRON 4 MG/1
4 TABLET, ORALLY DISINTEGRATING ORAL EVERY 6 HOURS PRN
Qty: 15 TABLET | Refills: 0 | Status: SHIPPED | OUTPATIENT
Start: 2022-06-17 | End: 2022-06-22

## 2022-06-17 RX ADMIN — ONDANSETRON 4 MG: 2 INJECTION INTRAMUSCULAR; INTRAVENOUS at 05:06

## 2022-06-17 RX ADMIN — POTASSIUM CHLORIDE 60 MEQ: 20 TABLET, EXTENDED RELEASE ORAL at 07:06

## 2022-06-17 RX ADMIN — SODIUM CHLORIDE 1000 ML: 0.9 INJECTION, SOLUTION INTRAVENOUS at 05:06

## 2022-06-17 NOTE — Clinical Note
"Isreal "Rober Randolph was seen and treated in our emergency department on 6/17/2022.     COVID-19 is present in our communities across the state. There is limited testing for COVID at this time, so not all patients can be tested. In this situation, your employee meets the following criteria:    Isreal Randolph has met the criteria for COVID-19 testing and has a NEGATIVE result. The employee can return to work once they are asymptomatic for 24 hours without the use of fever reducing medications (Tylenol, Motrin, etc).     If the employee is not fully vaccinated and had a close contact:  · Retest at 5 to 7 days post-exposure  · If possible, it is recommended that they quarantine for 5 days from the time of contact regardless of their test status.  · A mask should be worn post quarantine for 5 days.    If you have any questions or concerns, or if I can be of further assistance, please do not hesitate to contact me.    Sincerely,             Damaris Patel PA-C"

## 2022-06-17 NOTE — ED PROVIDER NOTES
"Encounter Date: 6/17/2022    SCRIBE #1 NOTE: I, Abbi Hernandesrajeev, am scribing for, and in the presence of,  Damaris Patel PA-C. I have scribed the following portions of the note - Other sections scribed: HPI, ROS, PE.       History     Chief Complaint   Patient presents with    Vomiting     Pt states," I think I have food poisoning. I am vomiting today after eating liver."     66 year old male with history of HTN, Obesity, HLD and abdominal hernia surgery presents at ED with his daughter with complaints of nausea and vomiting x6 beginning today. Patient notes associated symptoms of chills, abdominal discomfort. After vomiting episodes, pt states he becomes dizzy and lightheaded and feeling like 'he will pass out' and diaphoretic. Denies congestion, rhinorrhea, ear pain, diarrhea, blood in vomit, urinary symptoms, chest pain or SOB. Patient states he had a sick contact at work, but is unsure what the diagnosis is. Additionally, patient attests to passing gas and having normal bowel movement today    The history is provided by the patient and a relative. No  was used.     Review of patient's allergies indicates:   Allergen Reactions    Lactose intolerance  [lactase]      Other reaction(s): Diarrhea    Penicillins Other (See Comments)     Swelling of lymph nodes     Past Medical History:   Diagnosis Date    Allergy     Back pain     Hypertension     Mixed hyperlipidemia 7/3/2018    Obesity     Reflux     Sleep apnea 6/21/2013     Past Surgical History:   Procedure Laterality Date    CHOLECYSTECTOMY  2012    HERNIA REPAIR      RHINOPLASTY       Family History   Problem Relation Age of Onset    Cancer Mother 65        exposure related    Heart disease Father     Colon cancer Neg Hx     Esophageal cancer Neg Hx     Diabetes Neg Hx      Social History     Tobacco Use    Smoking status: Never Smoker    Smokeless tobacco: Never Used   Substance Use Topics    Alcohol use: Yes "     Alcohol/week: 3.0 standard drinks     Types: 3 Cans of beer per week     Comment: 3 times per week    Drug use: No     Review of Systems   Constitutional: Positive for chills and diaphoresis. Negative for fever.   HENT: Negative for congestion, ear pain, rhinorrhea and sore throat.    Eyes: Negative for redness.   Respiratory: Negative for shortness of breath and stridor.    Cardiovascular: Negative for chest pain.   Gastrointestinal: Positive for abdominal pain (Discomfort), nausea and vomiting. Negative for constipation and diarrhea.   Genitourinary: Negative for dysuria, frequency, hematuria and urgency.   Musculoskeletal: Negative for back pain and neck pain.   Skin: Negative for rash.   Neurological: Positive for dizziness. Negative for speech difficulty, weakness, light-headedness and numbness.   Hematological: Does not bruise/bleed easily.   Psychiatric/Behavioral: Negative for confusion.       Physical Exam     Initial Vitals [06/17/22 1637]   BP Pulse Resp Temp SpO2   (!) 157/77 (!) 49 16 98.5 °F (36.9 °C) 98 %      MAP       --         Physical Exam    Nursing note and vitals reviewed.  Constitutional: He appears well-developed and well-nourished. No distress.   HENT:   Head: Normocephalic.   Right Ear: Hearing, tympanic membrane, external ear and ear canal normal.   Left Ear: Hearing, tympanic membrane, external ear and ear canal normal.   Nose: Nose normal.   Mouth/Throat: Oropharynx is clear and moist. No oropharyngeal exudate, posterior oropharyngeal edema or posterior oropharyngeal erythema.   Eyes: Conjunctivae are normal.   Neck: Neck supple.   Cardiovascular: Regular rhythm. Bradycardia present.  Exam reveals no gallop and no friction rub.    No murmur heard.  Pulmonary/Chest: Breath sounds normal. No respiratory distress. He has no wheezes. He has no rhonchi. He has no rales.   Abdominal: Abdomen is soft. Bowel sounds are normal. He exhibits no distension. There is no abdominal tenderness.  There is no rebound and no guarding.   Musculoskeletal:      Cervical back: Neck supple.      Right lower leg: No swelling.      Left lower leg: No swelling.      Right ankle: No swelling.      Left ankle: No swelling.      Right foot: No swelling.      Left foot: No swelling.      Comments: No lower extremity swelling.      Lymphadenopathy:     He has no cervical adenopathy.   Neurological: He is alert.   Skin: Skin is warm and dry. No rash noted.   Psychiatric: He has a normal mood and affect.         ED Course   Procedures  Labs Reviewed   POCT URINALYSIS W/O SCOPE - Abnormal; Notable for the following components:       Result Value    Ketones, UA Trace (*)     Protein, UA Trace (*)     All other components within normal limits   POCT CMP - Abnormal; Notable for the following components:    POC BUN 23 (*)     POC Creatinine 1.4 (*)     POC Potassium 3.1 (*)     All other components within normal limits   POCT CBC   SARS-COV-2 RDRP GENE    Narrative:     This test utilizes isothermal nucleic acid amplification   technology to detect the SARS-CoV-2 RdRp nucleic acid segment.   The analytical sensitivity (limit of detection) is 125 genome   equivalents/mL.   A POSITIVE result implies infection with the SARS-CoV-2 virus;   the patient is presumed to be contagious.     A NEGATIVE result means that SARS-CoV-2 nucleic acids are not   present above the limit of detection. A NEGATIVE result should be   treated as presumptive. It does not rule out the possibility of   COVID-19 and should not be the sole basis for treatment decisions.   If COVID-19 is strongly suspected based on clinical and exposure   history, re-testing using an alternate molecular assay should be   considered.   This test is only for use under the Food and Drug   Administration s Emergency Use Authorization (EUA).   Commercial kits are provided by [employer].   Performance characteristics of the EUA have been independently   verified by [facility] Department of   Pathology and Laboratory Medicine.   _________________________________________________________________   The authorized Fact Sheet for Healthcare Providers and the authorized Fact   Sheet for Patients of the ID NOW COVID-19 are available on the FDA   website:     https://www.fda.gov/media/970335/download  https://www.fda.gov/media/908035/download          POCT URINALYSIS W/O SCOPE   POCT INFLUENZA A/B MOLECULAR   POCT CMP   POCT LIVER PANEL   POCT LIVER PANEL   POCT RAPID INFLUENZA A/B          Imaging Results    None          Medications   sodium chloride 0.9% bolus 1,000 mL (0 mLs Intravenous Stopped 6/17/22 1841)   ondansetron injection 4 mg (4 mg Intravenous Given 6/17/22 1740)   potassium chloride SA CR tablet 60 mEq (60 mEq Oral Given 6/17/22 1907)     Medical Decision Making:   ED Management:  65 y/o M presentying for NVD. Exam above. Abdomen soft and nontender. Labs unremarkable. covid flu negative. Considered bu7t doubt acute abdomen. Tolerating po. Feeling better. Chronic hypokalemia. Single dose given. Will refer to pcp. Return to ER for worsening symptoms or as needed          Scribe Attestation:   Scribe #1: I performed the above scribed service and the documentation accurately describes the services I performed. I attest to the accuracy of the note.               I, Damaris Patel PA-C , personally performed the services described in this documentation.  All medical record entries made by the scribe were at my direction and in my presence.  I have reviewed the chart and agree that the record reflects my personal performance and is accurate and complete.  Clinical Impression:   Final diagnoses:  [R42] Lightheaded  [K52.9] Gastroenteritis (Primary)  [R11.10, R19.7] Vomiting and diarrhea  [E87.6] Hypokalemia          ED Disposition Condition    Discharge Stable        ED Prescriptions     Medication Sig Dispense Start Date End Date Auth. Provider    acetaminophen (TYLENOL)  500 MG tablet Take 1 tablet (500 mg total) by mouth every 4 (four) hours as needed. 20 tablet 6/17/2022 6/22/2022 Damaris Patel PA-C    ondansetron (ZOFRAN-ODT) 4 MG TbDL Take 1 tablet (4 mg total) by mouth every 6 (six) hours as needed (for nausea). 15 tablet 6/17/2022 6/22/2022 Damaris Patel PA-C    promethazine (PHENERGAN) 25 MG tablet Take 1 tablet (25 mg total) by mouth every 6 (six) hours as needed for Nausea. MAY CAUSE DROWSINESS 20 tablet 6/17/2022 6/22/2022 Damaris Patel PA-C        Follow-up Information     Follow up With Specialties Details Why Contact Info    Jamaal Tellez MD Family Medicine Schedule an appointment as soon as possible for a visit in 2 days for follow up [address] [facility] [address]  [phone]      [facility] ED Emergency Medicine Go to  As needed, If symptoms worsen [address] [facility] [address] [phone]           Damaris Patel PA-C  06/17/22 2008

## 2022-06-17 NOTE — DISCHARGE INSTRUCTIONS

## 2022-07-15 DIAGNOSIS — I10 HYPERTENSION, UNSPECIFIED TYPE: ICD-10-CM

## 2022-07-15 NOTE — TELEPHONE ENCOUNTER
Refill Routing Note   Medication(s) are not appropriate for processing by [facility] for the following reason(s):      - Required vitals are abnormal  - Patient has been seen in the ED/Hospital since the last PCP visit    [facility] action(s):  Defer          Medication reconciliation completed: No     Appointments  past 12m or future 3m with PCP    Date Provider   Last Visit   6/14/2022 Jamaal Tellez MD   Next Visit   Visit date not found Jamaal Tellez MD   ED visits in past 90 days: 1        Note composed:12:30 PM 07/15/2022

## 2022-07-15 NOTE — TELEPHONE ENCOUNTER
No new care gaps identified.  [facility] Embedded Care Gaps. Reference number: [id number]. 7/15/2022   12:28:39 PM CDT

## 2022-07-16 RX ORDER — AMLODIPINE BESYLATE 10 MG/1
TABLET ORAL
Qty: 90 TABLET | Refills: 1 | Status: SHIPPED | OUTPATIENT
Start: 2022-07-16 | End: 2023-01-03

## 2022-09-15 ENCOUNTER — PES CALL (OUTPATIENT)
Dept: ADMINISTRATIVE | Facility: CLINIC | Age: 67
End: 2022-09-15
Payer: MEDICARE

## 2022-09-27 ENCOUNTER — PES CALL (OUTPATIENT)
Dept: ADMINISTRATIVE | Facility: CLINIC | Age: 67
End: 2022-09-27
Payer: MEDICARE

## 2022-10-03 ENCOUNTER — PATIENT MESSAGE (OUTPATIENT)
Dept: ADMINISTRATIVE | Facility: HOSPITAL | Age: 67
End: 2022-10-03
Payer: MEDICARE

## 2022-10-07 ENCOUNTER — PES CALL (OUTPATIENT)
Dept: ADMINISTRATIVE | Facility: CLINIC | Age: 67
End: 2022-10-07
Payer: MEDICARE

## 2022-11-02 ENCOUNTER — PES CALL (OUTPATIENT)
Dept: ADMINISTRATIVE | Facility: CLINIC | Age: 67
End: 2022-11-02
Payer: MEDICARE

## 2022-11-29 ENCOUNTER — OFFICE VISIT (OUTPATIENT)
Dept: FAMILY MEDICINE | Facility: CLINIC | Age: 67
End: 2022-11-29
Payer: MEDICARE

## 2022-11-29 VITALS
HEART RATE: 62 BPM | HEIGHT: 67 IN | DIASTOLIC BLOOD PRESSURE: 74 MMHG | OXYGEN SATURATION: 98 % | SYSTOLIC BLOOD PRESSURE: 140 MMHG | WEIGHT: 141.63 LBS | BODY MASS INDEX: 22.23 KG/M2

## 2022-11-29 DIAGNOSIS — L30.9 DERMATITIS: ICD-10-CM

## 2022-11-29 DIAGNOSIS — I10 ESSENTIAL HYPERTENSION: Primary | Chronic | ICD-10-CM

## 2022-11-29 DIAGNOSIS — E78.5 DYSLIPIDEMIA: ICD-10-CM

## 2022-11-29 DIAGNOSIS — Z23 NEED FOR INFLUENZA VACCINATION: ICD-10-CM

## 2022-11-29 DIAGNOSIS — R79.89 ABNORMAL SERUM THYROID STIMULATING HORMONE (TSH) LEVEL: ICD-10-CM

## 2022-11-29 DIAGNOSIS — R73.03 PREDIABETES: ICD-10-CM

## 2022-11-29 DIAGNOSIS — R63.4 UNINTENTIONAL WEIGHT LOSS: ICD-10-CM

## 2022-11-29 PROCEDURE — 4010F PR ACE/ARB THEARPY RXD/TAKEN: ICD-10-PCS | Mod: CPTII,S$GLB,, | Performed by: INTERNAL MEDICINE

## 2022-11-29 PROCEDURE — 3288F FALL RISK ASSESSMENT DOCD: CPT | Mod: CPTII,S$GLB,, | Performed by: INTERNAL MEDICINE

## 2022-11-29 PROCEDURE — G0008 FLU VACCINE - QUADRIVALENT - ADJUVANTED: ICD-10-PCS | Mod: S$GLB,,, | Performed by: INTERNAL MEDICINE

## 2022-11-29 PROCEDURE — 99214 OFFICE O/P EST MOD 30 MIN: CPT | Mod: S$GLB,,, | Performed by: INTERNAL MEDICINE

## 2022-11-29 PROCEDURE — 3077F SYST BP >= 140 MM HG: CPT | Mod: CPTII,S$GLB,, | Performed by: INTERNAL MEDICINE

## 2022-11-29 PROCEDURE — 1159F PR MEDICATION LIST DOCUMENTED IN MEDICAL RECORD: ICD-10-PCS | Mod: CPTII,S$GLB,, | Performed by: INTERNAL MEDICINE

## 2022-11-29 PROCEDURE — 99499 UNLISTED E&M SERVICE: CPT | Mod: S$GLB,,, | Performed by: INTERNAL MEDICINE

## 2022-11-29 PROCEDURE — 3008F BODY MASS INDEX DOCD: CPT | Mod: CPTII,S$GLB,, | Performed by: INTERNAL MEDICINE

## 2022-11-29 PROCEDURE — 3077F PR MOST RECENT SYSTOLIC BLOOD PRESSURE >= 140 MM HG: ICD-10-PCS | Mod: CPTII,S$GLB,, | Performed by: INTERNAL MEDICINE

## 2022-11-29 PROCEDURE — 90694 VACC AIIV4 NO PRSRV 0.5ML IM: CPT | Mod: S$GLB,,, | Performed by: INTERNAL MEDICINE

## 2022-11-29 PROCEDURE — 1126F AMNT PAIN NOTED NONE PRSNT: CPT | Mod: CPTII,S$GLB,, | Performed by: INTERNAL MEDICINE

## 2022-11-29 PROCEDURE — 3078F DIAST BP <80 MM HG: CPT | Mod: CPTII,S$GLB,, | Performed by: INTERNAL MEDICINE

## 2022-11-29 PROCEDURE — 90694 FLU VACCINE - QUADRIVALENT - ADJUVANTED: ICD-10-PCS | Mod: S$GLB,,, | Performed by: INTERNAL MEDICINE

## 2022-11-29 PROCEDURE — 3288F PR FALLS RISK ASSESSMENT DOCUMENTED: ICD-10-PCS | Mod: CPTII,S$GLB,, | Performed by: INTERNAL MEDICINE

## 2022-11-29 PROCEDURE — 99999 PR PBB SHADOW E&M-EST. PATIENT-LVL III: CPT | Mod: PBBFAC,,, | Performed by: INTERNAL MEDICINE

## 2022-11-29 PROCEDURE — 1159F MED LIST DOCD IN RCRD: CPT | Mod: CPTII,S$GLB,, | Performed by: INTERNAL MEDICINE

## 2022-11-29 PROCEDURE — 4010F ACE/ARB THERAPY RXD/TAKEN: CPT | Mod: CPTII,S$GLB,, | Performed by: INTERNAL MEDICINE

## 2022-11-29 PROCEDURE — 3078F PR MOST RECENT DIASTOLIC BLOOD PRESSURE < 80 MM HG: ICD-10-PCS | Mod: CPTII,S$GLB,, | Performed by: INTERNAL MEDICINE

## 2022-11-29 PROCEDURE — 99499 RISK ADDL DX/OHS AUDIT: ICD-10-PCS | Mod: S$GLB,,, | Performed by: INTERNAL MEDICINE

## 2022-11-29 PROCEDURE — 1101F PR PT FALLS ASSESS DOC 0-1 FALLS W/OUT INJ PAST YR: ICD-10-PCS | Mod: CPTII,S$GLB,, | Performed by: INTERNAL MEDICINE

## 2022-11-29 PROCEDURE — 3008F PR BODY MASS INDEX (BMI) DOCUMENTED: ICD-10-PCS | Mod: CPTII,S$GLB,, | Performed by: INTERNAL MEDICINE

## 2022-11-29 PROCEDURE — 1101F PT FALLS ASSESS-DOCD LE1/YR: CPT | Mod: CPTII,S$GLB,, | Performed by: INTERNAL MEDICINE

## 2022-11-29 PROCEDURE — 99214 PR OFFICE/OUTPT VISIT, EST, LEVL IV, 30-39 MIN: ICD-10-PCS | Mod: S$GLB,,, | Performed by: INTERNAL MEDICINE

## 2022-11-29 PROCEDURE — 1126F PR PAIN SEVERITY QUANTIFIED, NO PAIN PRESENT: ICD-10-PCS | Mod: CPTII,S$GLB,, | Performed by: INTERNAL MEDICINE

## 2022-11-29 PROCEDURE — G0008 ADMIN INFLUENZA VIRUS VAC: HCPCS | Mod: S$GLB,,, | Performed by: INTERNAL MEDICINE

## 2022-11-29 PROCEDURE — 99999 PR PBB SHADOW E&M-EST. PATIENT-LVL III: ICD-10-PCS | Mod: PBBFAC,,, | Performed by: INTERNAL MEDICINE

## 2022-11-29 RX ORDER — LISINOPRIL 20 MG/1
20 TABLET ORAL DAILY
Qty: 90 TABLET | Refills: 1 | Status: SHIPPED | OUTPATIENT
Start: 2022-11-29 | End: 2023-02-09 | Stop reason: SDUPTHER

## 2022-11-29 NOTE — PROGRESS NOTES
Subjective:     HPI  Isreal Randolph is a 66 y.o. male with medical diagnoses as listed in the medical history and problem list that presents for above complaint(s).    Patient with 35 lbs approximately over the past 3-4 months  He is very concerned about this since the weight loss have been unintentional   His appetite hasn't been very good currently - has to 'force himself to eat'   No abdominal pain currently   Mother developed intentismal cancer    The patient with an abnormal A1c in July of 2018 as well as an abnormal TSH of 0.305 in with a normal free T4 at that time    Patient Care Team:  Jamaal Tellez MD as PCP - General (Family Medicine)  Pam Novak LPN (Inactive) as Care Coordinator (Internal Medicine)      PAST MEDICAL HISTORY:  Past Medical History:   Diagnosis Date    Allergy     Back pain     Hypertension     Mixed hyperlipidemia 7/3/2018    Obesity     Reflux     Sleep apnea 6/21/2013       PAST SURGICAL HISTORY:  Past Surgical History:   Procedure Laterality Date    CHOLECYSTECTOMY  2012    HERNIA REPAIR      RHINOPLASTY         SOCIAL HISTORY:  Social History     Socioeconomic History    Marital status:    Occupational History    Occupation: [employer]     Employer: [employer]     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  Review of patient's allergies indicates:   Allergen Reactions    Lactose intolerance  [lactase]      Other reaction(s): Diarrhea    Penicillins Other (See Comments)  "    Swelling of lymph nodes     Current Outpatient Medications   Medication Sig Dispense Refill    amLODIPine (NORVASC) 10 MG tablet TAKE 1 TABLET BY MOUTH ONCE DAILY 90 tablet 1    cetirizine (ZYRTEC) 10 MG tablet Take 10 mg by mouth once daily.      cholestyramine (QUESTRAN) 4 gram packet MIX 1 PACKET IN AT LEAST 6 OUNCES OF LIQUID AND TAKE BY MOUTH TWICE DAILY WITH MEALS (Patient not taking: Reported on 4/13/2022) 60 packet 5    lisinopriL (PRINIVIL,ZESTRIL) 20 MG tablet Take 1 tablet (20 mg total) by mouth once daily. 90 tablet 1    naproxen (NAPROSYN) 500 MG tablet TAKE 1 TABLET BY MOUTH 2 TIMES A DAY AS NEEDED. (Patient not taking: Reported on 11/29/2022) 30 tablet 0    omega-3 fatty acids 1,000 mg Cap Take 2 g by mouth.       No current facility-administered medications for this visit.         Objective:       Physical Exam  Vitals:    11/29/22 1012   BP: (!) 140/74   Pulse: 62   SpO2: 98%   Weight: 64.3 kg (141 lb 10.3 oz)   Height: 5' 7" (1.702 m)    Body mass index is 22.18 kg/m².  Weight: 64.3 kg (141 lb 10.3 oz)   Height: 5' 7" (170.2 cm)   Physical Exam  Vitals reviewed.   Constitutional:       Appearance: He is well-developed.      Comments: Mild muscle wasting   HENT:      Head: Normocephalic and atraumatic.   Eyes:      Conjunctiva/sclera: Conjunctivae normal.   Cardiovascular:      Rate and Rhythm: Normal rate.      Heart sounds: No murmur heard.    No gallop.   Pulmonary:      Effort: Pulmonary effort is normal.      Breath sounds: No wheezing or rales.   Musculoskeletal:      Cervical back: Normal range of motion and neck supple.   Lymphadenopathy:      Cervical: No cervical adenopathy.   Skin:     General: Skin is warm and dry.      Findings: No rash.      Comments: Erythematous patches of mid back  Shallow ulcers with surrounding erythema scattered mid abdominal region   Neurological:      Mental Status: He is alert.      Cranial Nerves: No cranial nerve deficit.   Psychiatric:         Mood and " Affect: Mood normal.         Behavior: Behavior normal.           Assessment:     1. Essential hypertension    2. Dermatitis    3. Abnormal serum thyroid stimulating hormone (TSH) level    4. Prediabetes    5. Dyslipidemia    6. Unintentional weight loss    7. Need for influenza vaccination      Plan:     Isreal was seen today for weight loss and rash.    Diagnoses and all orders for this visit:    Essential hypertension  Discussed patient's recent uncontrolled blood pressure readings since June 2022   Add ACE-inhibitor presently to  amlodipine  Recheck blood pressure in 2 weeks  -     lisinopriL (PRINIVIL,ZESTRIL) 20 MG tablet; Take 1 tablet (20 mg total) by mouth once daily.  -     Lipid Panel; Future  -     CBC Auto Differential; Future  -     Comprehensive Metabolic Panel; Future        Unintentional weight loss  Patient with colonoscopy apparently within the past 10 years however does have family history of colon cancer and non currently up-to-date with colon cancer screening  Will refer to gastroenterology for consideration of endoscopy and colonoscopy in the setting of significant weight loss within the last 6 months    Dermatitis  Unclear etiology recommend dermatology evaluation  -     Ambulatory referral/consult to Dermatology; Future    Abnormal serum thyroid stimulating hormone (TSH) level  Mildly decreased TSH noted in 2018   Repeat TSH in the setting current weight loss  -     TSH; Future    Prediabetes  Patient with A1c greater than 6% in 2018 he is due for repeat  -     TSH; Future  -     Hemoglobin A1C; Future    Dyslipidemia  Patient with elevated lipids last check previously on atorvastatin 80 mg daily but discontinued in 2019   Recheck lipids may need restart of statin therapy    Need for influenza vaccination  -     Influenza (FLUAD) - Quadrivalent (Adjuvanted) *Preferred* (65+) (PF)      Health Maintenance reviewed, addressed as per orders      1. The patient indicates understanding of these  issues and agrees with the plan. Brief care plan is updated and reviewed with the patient as applicable.   2. The patient is given an After Visit Summary that lists all medications with directions, allergies, orders placed during this encounter and follow-up instructions.   3. I have reviewed the patient's medical information including past medical, family, and social history sections including the medications and allergies.   4. We discussed the patient's current medications. I reconciled the patient's medication list and prepared and supplied needed refills.       Edi Mcneil MD  Internal Medicine-Pediatrics

## 2022-11-29 NOTE — PROGRESS NOTES
Patient given flu vaccine via injection. 0 complaints of, tolerated well. VIS given & advised to wait in lobby 15 mins for monitoring. Verbalized understanding.

## 2022-12-13 ENCOUNTER — CLINICAL SUPPORT (OUTPATIENT)
Dept: FAMILY MEDICINE | Facility: CLINIC | Age: 67
End: 2022-12-13
Payer: MEDICARE

## 2022-12-13 ENCOUNTER — TELEPHONE (OUTPATIENT)
Dept: FAMILY MEDICINE | Facility: CLINIC | Age: 67
End: 2022-12-13

## 2022-12-13 VITALS — DIASTOLIC BLOOD PRESSURE: 64 MMHG | HEART RATE: 60 BPM | SYSTOLIC BLOOD PRESSURE: 118 MMHG | OXYGEN SATURATION: 99 %

## 2022-12-13 DIAGNOSIS — I10 ESSENTIAL HYPERTENSION: Primary | ICD-10-CM

## 2022-12-13 PROCEDURE — 99999 PR PBB SHADOW E&M-EST. PATIENT-LVL II: CPT | Mod: PBBFAC,,,

## 2022-12-13 PROCEDURE — 99999 PR PBB SHADOW E&M-EST. PATIENT-LVL II: ICD-10-PCS | Mod: PBBFAC,,,

## 2022-12-13 NOTE — TELEPHONE ENCOUNTER
----- Message from Alyssa Canseco sent at 12/13/2022 10:18 AM CST -----  Regarding: medication  Name of Who is Calling: ANITA LOCKWOOD [[id number]]      What is the request in detail: #patient states he never received the medication he needs to take for his colonoscopy's on tomorrow       Can the clinic reply by [facility]:no       What Number to Call Back if not in [facility]: [phone]

## 2022-12-13 NOTE — PROGRESS NOTES
Isreal Randolph 66 y.o. male is here today for Blood Pressure check.   History of HTN yes.    Review of patient's allergies indicates:   Allergen Reactions    Lactose intolerance  [lactase]      Other reaction(s): Diarrhea    Penicillins Other (See Comments)     Swelling of lymph nodes     Creatinine   Date Value Ref Range Status   09/11/2021 1.2 0.5 - 1.4 mg/dL Final     Sodium   Date Value Ref Range Status   09/11/2021 142 136 - 145 mmol/L Final     Potassium   Date Value Ref Range Status   09/11/2021 2.8 (L) 3.5 - 5.1 mmol/L Final   ]  Patient verifies taking blood pressure medications on a regular basis at the same time of the day.     Current Outpatient Medications:     amLODIPine (NORVASC) 10 MG tablet, TAKE 1 TABLET BY MOUTH ONCE DAILY, Disp: 90 tablet, Rfl: 1    cetirizine (ZYRTEC) 10 MG tablet, Take 10 mg by mouth once daily., Disp: , Rfl:     cholestyramine (QUESTRAN) 4 gram packet, MIX 1 PACKET IN AT LEAST 6 OUNCES OF LIQUID AND TAKE BY MOUTH TWICE DAILY WITH MEALS (Patient not taking: Reported on 4/13/2022), Disp: 60 packet, Rfl: 5    lisinopriL (PRINIVIL,ZESTRIL) 20 MG tablet, Take 1 tablet (20 mg total) by mouth once daily., Disp: 90 tablet, Rfl: 1    naproxen (NAPROSYN) 500 MG tablet, TAKE 1 TABLET BY MOUTH 2 TIMES A DAY AS NEEDED. (Patient not taking: Reported on 11/29/2022), Disp: 30 tablet, Rfl: 0    omega-3 fatty acids 1,000 mg Cap, Take 2 g by mouth., Disp: , Rfl:   Does patient have record of home blood pressure readings no.  .   Last dose of blood pressure medication was taken at 7am.  Patient is asymptomatic.   Complains of none    Vitals:    12/13/22 0806   BP: 118/64   BP Location: Left arm   Patient Position: Sitting   BP Method: Medium (Manual)   Pulse: 60   SpO2: 99%         Dr. AJITH Mcneil informed of nurse visit.

## 2022-12-14 ENCOUNTER — TELEPHONE (OUTPATIENT)
Dept: FAMILY MEDICINE | Facility: CLINIC | Age: 67
End: 2022-12-14
Payer: MEDICARE

## 2022-12-14 NOTE — TELEPHONE ENCOUNTER
----- Message from Earline Soliz sent at 12/13/2022 12:18 PM CST -----  Type:  Patient Returning Call    Who Called: Self     Who Left Message for Patient: Carmela     Does the patient know what this is regarding?:Yes     Would the patient rather a call back or a response via [facility]? Call     Best Call Back Number: [phone]

## 2022-12-15 ENCOUNTER — OFFICE VISIT (OUTPATIENT)
Dept: GASTROENTEROLOGY | Facility: CLINIC | Age: 67
End: 2022-12-15
Payer: MEDICARE

## 2022-12-15 VITALS
HEIGHT: 67 IN | WEIGHT: 143.38 LBS | DIASTOLIC BLOOD PRESSURE: 65 MMHG | HEART RATE: 57 BPM | BODY MASS INDEX: 22.51 KG/M2 | SYSTOLIC BLOOD PRESSURE: 107 MMHG

## 2022-12-15 DIAGNOSIS — R63.4 UNINTENTIONAL WEIGHT LOSS: Primary | ICD-10-CM

## 2022-12-15 DIAGNOSIS — K91.89 POSTCHOLECYSTECTOMY DIARRHEA: ICD-10-CM

## 2022-12-15 DIAGNOSIS — R19.7 POSTCHOLECYSTECTOMY DIARRHEA: ICD-10-CM

## 2022-12-15 PROCEDURE — 99999 PR PBB SHADOW E&M-EST. PATIENT-LVL IV: CPT | Mod: PBBFAC,,, | Performed by: NURSE PRACTITIONER

## 2022-12-15 PROCEDURE — 1160F RVW MEDS BY RX/DR IN RCRD: CPT | Mod: CPTII,S$GLB,, | Performed by: NURSE PRACTITIONER

## 2022-12-15 PROCEDURE — 3078F DIAST BP <80 MM HG: CPT | Mod: CPTII,S$GLB,, | Performed by: NURSE PRACTITIONER

## 2022-12-15 PROCEDURE — 1159F MED LIST DOCD IN RCRD: CPT | Mod: CPTII,S$GLB,, | Performed by: NURSE PRACTITIONER

## 2022-12-15 PROCEDURE — 4010F PR ACE/ARB THEARPY RXD/TAKEN: ICD-10-PCS | Mod: CPTII,S$GLB,, | Performed by: NURSE PRACTITIONER

## 2022-12-15 PROCEDURE — 3074F SYST BP LT 130 MM HG: CPT | Mod: CPTII,S$GLB,, | Performed by: NURSE PRACTITIONER

## 2022-12-15 PROCEDURE — 4010F ACE/ARB THERAPY RXD/TAKEN: CPT | Mod: CPTII,S$GLB,, | Performed by: NURSE PRACTITIONER

## 2022-12-15 PROCEDURE — 1126F AMNT PAIN NOTED NONE PRSNT: CPT | Mod: CPTII,S$GLB,, | Performed by: NURSE PRACTITIONER

## 2022-12-15 PROCEDURE — 1159F PR MEDICATION LIST DOCUMENTED IN MEDICAL RECORD: ICD-10-PCS | Mod: CPTII,S$GLB,, | Performed by: NURSE PRACTITIONER

## 2022-12-15 PROCEDURE — 3288F FALL RISK ASSESSMENT DOCD: CPT | Mod: CPTII,S$GLB,, | Performed by: NURSE PRACTITIONER

## 2022-12-15 PROCEDURE — 3288F PR FALLS RISK ASSESSMENT DOCUMENTED: ICD-10-PCS | Mod: CPTII,S$GLB,, | Performed by: NURSE PRACTITIONER

## 2022-12-15 PROCEDURE — 1101F PT FALLS ASSESS-DOCD LE1/YR: CPT | Mod: CPTII,S$GLB,, | Performed by: NURSE PRACTITIONER

## 2022-12-15 PROCEDURE — 1160F PR REVIEW ALL MEDS BY PRESCRIBER/CLIN PHARMACIST DOCUMENTED: ICD-10-PCS | Mod: CPTII,S$GLB,, | Performed by: NURSE PRACTITIONER

## 2022-12-15 PROCEDURE — 3074F PR MOST RECENT SYSTOLIC BLOOD PRESSURE < 130 MM HG: ICD-10-PCS | Mod: CPTII,S$GLB,, | Performed by: NURSE PRACTITIONER

## 2022-12-15 PROCEDURE — 1126F PR PAIN SEVERITY QUANTIFIED, NO PAIN PRESENT: ICD-10-PCS | Mod: CPTII,S$GLB,, | Performed by: NURSE PRACTITIONER

## 2022-12-15 PROCEDURE — 99203 OFFICE O/P NEW LOW 30 MIN: CPT | Mod: S$GLB,,, | Performed by: NURSE PRACTITIONER

## 2022-12-15 PROCEDURE — 3008F PR BODY MASS INDEX (BMI) DOCUMENTED: ICD-10-PCS | Mod: CPTII,S$GLB,, | Performed by: NURSE PRACTITIONER

## 2022-12-15 PROCEDURE — 99999 PR PBB SHADOW E&M-EST. PATIENT-LVL IV: ICD-10-PCS | Mod: PBBFAC,,, | Performed by: NURSE PRACTITIONER

## 2022-12-15 PROCEDURE — 1101F PR PT FALLS ASSESS DOC 0-1 FALLS W/OUT INJ PAST YR: ICD-10-PCS | Mod: CPTII,S$GLB,, | Performed by: NURSE PRACTITIONER

## 2022-12-15 PROCEDURE — 3078F PR MOST RECENT DIASTOLIC BLOOD PRESSURE < 80 MM HG: ICD-10-PCS | Mod: CPTII,S$GLB,, | Performed by: NURSE PRACTITIONER

## 2022-12-15 PROCEDURE — 99203 PR OFFICE/OUTPT VISIT, NEW, LEVL III, 30-44 MIN: ICD-10-PCS | Mod: S$GLB,,, | Performed by: NURSE PRACTITIONER

## 2022-12-15 PROCEDURE — 3008F BODY MASS INDEX DOCD: CPT | Mod: CPTII,S$GLB,, | Performed by: NURSE PRACTITIONER

## 2022-12-15 RX ORDER — SODIUM, POTASSIUM,MAG SULFATES 17.5-3.13G
1 SOLUTION, RECONSTITUTED, ORAL ORAL DAILY
Qty: 1 KIT | Refills: 0 | Status: SHIPPED | OUTPATIENT
Start: 2022-12-15 | End: 2022-12-17

## 2022-12-15 RX ORDER — MONTELUKAST SODIUM 4 MG/1
1 TABLET, CHEWABLE ORAL 2 TIMES DAILY
Qty: 60 TABLET | Refills: 2 | Status: SHIPPED | OUTPATIENT
Start: 2022-12-15

## 2022-12-15 NOTE — PATIENT INSTRUCTIONS
SUPREP Instructions    [facility] [address]  [address]    You are scheduled for a EGD/colonoscopy with Dr. Fonseca on 1/3/2022 at [facility].  To ensure that your test is accurate and complete, you MUST follow these instructions listed below.  If you have any questions, please call our office at [phone].  Plan on being at the hospital for your procedure for 3-4 hours.    1.  Follow a CLEAR LIQUID DIET for the entire day before your scheduled colonoscopy.  This means no solid food the entire day starting when you wake.  You may have as much of the clear liquids as you want throughout the day.   CLEAR LIQUID DIET:   - Avoid Red, Orange, Purple, and/or Blue food coloring   - NO DAIRY   - You can have:  Coffee with sugar (no creamer), tea, water, soda, apple or white grape juice, chicken or beef broth/bouillon (no meat, noodles, or veggies), green/yellow popsicles, green/yellow Jell-O, lemonade.    2.  AT 5 pm the evening before your colonoscopy, POUR ONE (1) BOTTLE OF SUPREP INTO THE MIXING CONTAINER, PROVIDED INSIDE THE BOX.  ADD WATER TO THE LINE ON THE CONTAINER AND MIX IT WELL.  DRINK THE ENTIRE CONTAINER AND THEN DRINK TWO (2) MORE CONTAINERS OF WATER OVER THE NEXT 1 HOUR.  This is sometimes easier to drink if this solution is cold, so you can mix the solution 20 minutes ahead of time and place in the refrigerator prior to drinking.  You have to drink the solution within 30-45 minutes of mixing it.  Do NOT put this solution over ice.  It IS ok to drink with a straw.    3.  The endoscopy department will call you 1 day before your colonoscopy to tell you the exact time to arrive, AND to tell you the exact time to drink the 2nd portion of your prep (which will be FIVE HOURS BEFORE YOUR ARRIVAL TIME).  At this time given to you, POUR ONE (1) BOTTLE OF SUPREP INTO THE MIXING CONTAINER, PROVIDED INSIDE THE BOX.  ADD WATER TO THE LINE ON THE CONTAINER AND MIX  IT WELL.  DRINK THE ENTIRE CONTAINER AND THEN DRINK TWO (2) MORE CONTAINERS OF WATER OVER THE NEXT 1 HOUR.  This is sometimes easier to drink if this solution is cold, so you can mix the solution 20 minutes ahead of time and place in the refrigerator prior to drinking.  You have to drink the solution within 30-45 minutes of mixing it.  Do NOT put this solution over ice.  It IS ok to drink with a straw.  Once this is complete, you may not have ANYTHING else by mouth!    4.  You must have someone with you to DRIVE YOU HOME since you will be receiving IV sedation for the colonoscopy.    5.  It is ok to take MOST of your REGULAR MEDICATIONS  in the morning of your test with a SIP of water.  THE ONLY MEDS YOU NEED TO HOLD ARE YOUR DIABETES MEDICATIONS,  SOME BLOOD PRESSURE MEDS, AND BLOOD THINNERS IF OK'D BY YOUR DOCTOR.  Do NOT have anything else to eat or drink the morning of your colonoscopy.  It is ok to brush your teeth.    6.  If you are on blood thinners THAT YOU HAVE BEEN INSTRUCTED TO HOLD BY YOUR DOCTOR FOR THIS PROCEDURE, then do NOT take this the morning of your colonoscopy.  Do NOT stop these medications on your own, they must be approved to be held by your doctor.  Your colonoscopy can NOT be done if you are on these medications.  Examples of blood thinners include: Coumadin, Aggrenox, Plavix, Pradaxa, Reapro, Pletal, Xarelto, Ticagrelor, Brilinta, Eliquis, and high dose aspirin (325 mg).  You do not have to stop baby aspirin 81 mg.    7.  IF YOU ARE DIABETIC:  NO INSULIN OR ORAL MEDICATIONS THE MORNING OF THE COLONOSCOPY.  TAKE ONLY HALF THE DOSE OF YOUR INSULIN THE DAY BEFORE THE COLONOSCOPY.  DO NOT TAKE ANY ORAL DIABETIC MEDICATIONS THE DAY BEFORE THE COLONOSCOPY.  IF YOU ARE AN INSULIN DEPENDENT DIABETIC WITH UNSTABLE BLOOD SUGARS, NOTIFY YOUR PRIMARY CARE PHYSICIAN FOR INSTRUCTIONS.    You will receive a call a few days before your colonoscopy to tell you the time to arrive.  If you have not received  a call by the day before your procedure, call the Pre-op Coordinator at [phone].

## 2022-12-15 NOTE — PROGRESS NOTES
Subjective:       Patient ID: Isreal Randolph is a 66 y.o. male.    Chief Complaint: Other (Unintentional Weight loss)    67 y/o male with hypertension, hyperlipidemia, and post-cholecystectomy diarrhea referred by PCP for unintentional weight loss. Patient reports ~30 lbs weight loss over the last 3-4 months. Smokes marijuana twice weekly to stimulate appetite. Denies chest pain, shortness of breath, abdominal pain, nausea, vomiting, dysphagia, fever, fatigue, or night sweats. Non-bloody diarrhea previously controlled with Questran; has been out of rx for over a year. Has never had an upper or lower endoscopy. No known FH colon cancer.       Past Medical History:   Diagnosis Date    Allergy     Back pain     Hypertension     Mixed hyperlipidemia 7/3/2018    Obesity     Reflux     Sleep apnea 6/21/2013       Past Surgical History:   Procedure Laterality Date    CHOLECYSTECTOMY  2012    HERNIA REPAIR      RHINOPLASTY         Family History   Problem Relation Age of Onset    Cancer Mother 65        exposure related    Heart disease Father     Colon cancer Neg Hx     Esophageal cancer Neg Hx     Diabetes Neg Hx        Social History     Socioeconomic History    Marital status:    Occupational History    Occupation: [employer]     Employer: [email or web address]     Comment: petrochemical plants   Tobacco Use    Smoking status: Never    Smokeless tobacco: Never   Substance and Sexual Activity    Alcohol use: Yes     Alcohol/week: 3.0 standard drinks     Types: 3 Cans of beer per week     Comment: 3 times per week    Drug use: Yes     Types: Marijuana    Sexual activity: Yes       Review of Systems   Constitutional:  Positive for appetite change and unexpected weight change. Negative for fatigue and fever.   HENT:  Negative for trouble swallowing.    Respiratory:  Negative for shortness of breath.    Cardiovascular:  Negative for chest pain.   Gastrointestinal:  Positive for diarrhea. Negative for abdominal pain.   Neurological:   "Negative for weakness.   Hematological:  Negative for adenopathy. Does not bruise/bleed easily.   Psychiatric/Behavioral:  Negative for dysphoric mood.        Objective:     Vitals:    12/15/22 0903   BP: 107/65   BP Location: Left arm   Patient Position: Sitting   BP Method: Large (Automatic)   Pulse: (!) 57   Weight: 65 kg (143 lb 6.4 oz)   Height: 5' 7" (1.702 m)          Physical Exam  Constitutional:       General: He is not in acute distress.     Appearance: Normal appearance. He is not ill-appearing.   HENT:      Head: Normocephalic.   Eyes:      Conjunctiva/sclera: Conjunctivae normal.      Pupils: Pupils are equal, round, and reactive to light.   Cardiovascular:      Rate and Rhythm: Normal rate.      Pulses: Normal pulses.   Pulmonary:      Effort: Pulmonary effort is normal. No respiratory distress.   Musculoskeletal:         General: Normal range of motion.      Cervical back: Normal range of motion.   Skin:     General: Skin is warm and dry.   Neurological:      Mental Status: He is alert and oriented to person, place, and time.   Psychiatric:         Mood and Affect: Mood normal.         Behavior: Behavior normal.             Assessment:         ICD-10-CM ICD-9-CM   1. Unintentional weight loss  R63.4 783.21   2. Postcholecystectomy diarrhea  K91.89 564.4    R19.7        Plan:       Unintentional weight loss  -     Ambulatory referral/consult to Gastroenterology  -     Case Request Endoscopy: EGD (ESOPHAGOGASTRODUODENOSCOPY), COLONOSCOPY  -     sodium,potassium,mag sulfates (SUPREP BOWEL PREP KIT) 17.5-3.13-1.6 gram SolR; Take 177 mLs by mouth once daily. for 2 days  Dispense: 1 kit; Refill: 0    Postcholecystectomy diarrhea  -     colestipoL (COLESTID) 1 gram Tab; Take 1 tablet (1 g total) by mouth 2 (two) times daily.  Dispense: 60 tablet; Refill: 2      Follow up if symptoms worsen or fail to improve.     Patient's Medications   New Prescriptions    COLESTIPOL (COLESTID) 1 GRAM TAB    Take 1 tablet " (1 g total) by mouth 2 (two) times daily.    SODIUM,POTASSIUM,MAG SULFATES (SUPREP BOWEL PREP KIT) 17.5-3.13-1.6 GRAM SOLR    Take 177 mLs by mouth once daily. for 2 days   Previous Medications    AMLODIPINE (NORVASC) 10 MG TABLET    TAKE 1 TABLET BY MOUTH ONCE DAILY    CETIRIZINE (ZYRTEC) 10 MG TABLET    Take 10 mg by mouth once daily.    LISINOPRIL (PRINIVIL,ZESTRIL) 20 MG TABLET    Take 1 tablet (20 mg total) by mouth once daily.    NAPROXEN (NAPROSYN) 500 MG TABLET    TAKE 1 TABLET BY MOUTH 2 TIMES A DAY AS NEEDED.    OMEGA-3 FATTY ACIDS 1,000 MG CAP    Take 2 g by mouth.   Modified Medications    No medications on file   Discontinued Medications    CHOLESTYRAMINE (QUESTRAN) 4 GRAM PACKET    MIX 1 PACKET IN AT LEAST 6 OUNCES OF LIQUID AND TAKE BY MOUTH TWICE DAILY WITH MEALS

## 2023-01-03 DIAGNOSIS — I10 HYPERTENSION, UNSPECIFIED TYPE: ICD-10-CM

## 2023-01-03 RX ORDER — AMLODIPINE BESYLATE 10 MG/1
10 TABLET ORAL DAILY
Qty: 90 TABLET | Refills: 1 | Status: CANCELLED | OUTPATIENT
Start: 2023-01-03

## 2023-01-03 NOTE — TELEPHONE ENCOUNTER
No new care gaps identified.  [facility] Embedded Care Gaps. Reference number: [id number]. 1/03/2023   1:52:23 PM CST

## 2023-01-03 NOTE — TELEPHONE ENCOUNTER
----- Message from Irina Jackson sent at 1/3/2023  1:40 PM CST -----  Regarding: self [phone]  Type: RX Refill Request    Who Called:  self     Have you contacted your pharmacy: yes    Refill or New Rx: refill     RX Name and Strength:amLODIPine (NORVASC) 10 MG tablet    Preferred Pharmacy with phone number:  [id number] - [place], [place] - [address] [address] [facility] [address]  [id number] [address] [facility] [address]  [place] [address]  Phone: [phone] Fax: [phone]      Local or Mail Order: local     Would the patient rather a call back or a response via My [facility]? Call back     Best Call Back Number: [phone]      Pt is out of meds

## 2023-01-04 NOTE — TELEPHONE ENCOUNTER
Patient overdue for labs ordered last visit - please schedule him for all overdue labs  BP medication refilled on 1/3/22 also needs PCP appt

## 2023-01-09 ENCOUNTER — HOSPITAL ENCOUNTER (EMERGENCY)
Facility: HOSPITAL | Age: 68
Discharge: HOME | End: 2023-01-09
Attending: EMERGENCY MEDICINE
Payer: MEDICARE

## 2023-01-09 VITALS
BODY MASS INDEX: 22.87 KG/M2 | WEIGHT: 146 LBS | TEMPERATURE: 98 F | DIASTOLIC BLOOD PRESSURE: 97 MMHG | RESPIRATION RATE: 20 BRPM | SYSTOLIC BLOOD PRESSURE: 175 MMHG | OXYGEN SATURATION: 97 % | HEART RATE: 50 BPM

## 2023-01-09 DIAGNOSIS — R51.9 OCCIPITAL HEADACHE: Primary | ICD-10-CM

## 2023-01-09 DIAGNOSIS — I10 HYPERTENSION, UNSPECIFIED TYPE: ICD-10-CM

## 2023-01-09 LAB
ALBUMIN SERPL BCP-MCNC: 3.7 G/DL (ref 3.5–5.2)
ALP SERPL-CCNC: 58 U/L (ref 55–135)
ALT SERPL W/O P-5'-P-CCNC: 42 U/L (ref 10–44)
ANION GAP SERPL CALC-SCNC: 8 MMOL/L (ref 8–16)
AST SERPL-CCNC: 37 U/L (ref 10–40)
BASOPHILS # BLD AUTO: 0.03 K/UL (ref 0–0.2)
BASOPHILS NFR BLD: 0.7 % (ref 0–1.9)
BILIRUB SERPL-MCNC: 0.5 MG/DL (ref 0.1–1)
BUN SERPL-MCNC: 16 MG/DL (ref 8–23)
CALCIUM SERPL-MCNC: 10.5 MG/DL (ref 8.7–10.5)
CHLORIDE SERPL-SCNC: 104 MMOL/L (ref 95–110)
CO2 SERPL-SCNC: 26 MMOL/L (ref 23–29)
CREAT SERPL-MCNC: 0.9 MG/DL (ref 0.5–1.4)
DIFFERENTIAL METHOD: ABNORMAL
EOSINOPHIL # BLD AUTO: 0.1 K/UL (ref 0–0.5)
EOSINOPHIL NFR BLD: 2.3 % (ref 0–8)
ERYTHROCYTE [DISTWIDTH] IN BLOOD BY AUTOMATED COUNT: 13.8 % (ref 11.5–14.5)
EST. GFR  (NO RACE VARIABLE): >60 ML/MIN/1.73 M^2
GLUCOSE SERPL-MCNC: 86 MG/DL (ref 70–110)
HCT VFR BLD AUTO: 34.6 % (ref 40–54)
HGB BLD-MCNC: 11.3 G/DL (ref 14–18)
IMM GRANULOCYTES # BLD AUTO: 0.01 K/UL (ref 0–0.04)
IMM GRANULOCYTES NFR BLD AUTO: 0.2 % (ref 0–0.5)
LYMPHOCYTES # BLD AUTO: 0.9 K/UL (ref 1–4.8)
LYMPHOCYTES NFR BLD: 20.6 % (ref 18–48)
MCH RBC QN AUTO: 33.4 PG (ref 27–31)
MCHC RBC AUTO-ENTMCNC: 32.7 G/DL (ref 32–36)
MCV RBC AUTO: 102 FL (ref 82–98)
MONOCYTES # BLD AUTO: 0.3 K/UL (ref 0.3–1)
MONOCYTES NFR BLD: 7.5 % (ref 4–15)
NEUTROPHILS # BLD AUTO: 2.9 K/UL (ref 1.8–7.7)
NEUTROPHILS NFR BLD: 68.7 % (ref 38–73)
NRBC BLD-RTO: 0 /100 WBC
PLATELET # BLD AUTO: 276 K/UL (ref 150–450)
PMV BLD AUTO: 9.7 FL (ref 9.2–12.9)
POTASSIUM SERPL-SCNC: 3.3 MMOL/L (ref 3.5–5.1)
PROT SERPL-MCNC: 7.5 G/DL (ref 6–8.4)
RBC # BLD AUTO: 3.38 M/UL (ref 4.6–6.2)
SODIUM SERPL-SCNC: 138 MMOL/L (ref 136–145)
WBC # BLD AUTO: 4.28 K/UL (ref 3.9–12.7)

## 2023-01-09 PROCEDURE — 99285 EMERGENCY DEPT VISIT HI MDM: CPT | Mod: 25

## 2023-01-09 PROCEDURE — 25000003 PHARM REV CODE 250: Performed by: PHYSICIAN ASSISTANT

## 2023-01-09 PROCEDURE — 93010 EKG 12-LEAD: ICD-10-PCS | Mod: ,,, | Performed by: INTERNAL MEDICINE

## 2023-01-09 PROCEDURE — 80053 COMPREHEN METABOLIC PANEL: CPT | Performed by: PHYSICIAN ASSISTANT

## 2023-01-09 PROCEDURE — 99284 EMERGENCY DEPT VISIT MOD MDM: CPT | Mod: ,,, | Performed by: PHYSICIAN ASSISTANT

## 2023-01-09 PROCEDURE — 93010 ELECTROCARDIOGRAM REPORT: CPT | Mod: ,,, | Performed by: INTERNAL MEDICINE

## 2023-01-09 PROCEDURE — 85025 COMPLETE CBC W/AUTO DIFF WBC: CPT | Performed by: PHYSICIAN ASSISTANT

## 2023-01-09 PROCEDURE — 99284 PR EMERGENCY DEPT VISIT,LEVEL IV: ICD-10-PCS | Mod: ,,, | Performed by: PHYSICIAN ASSISTANT

## 2023-01-09 PROCEDURE — 93005 ELECTROCARDIOGRAM TRACING: CPT

## 2023-01-09 RX ORDER — ACETAMINOPHEN 325 MG/1
650 TABLET ORAL
Status: COMPLETED | OUTPATIENT
Start: 2023-01-09 | End: 2023-01-09

## 2023-01-09 RX ORDER — AMLODIPINE BESYLATE 10 MG/1
10 TABLET ORAL DAILY
Qty: 30 TABLET | Refills: 0 | Status: SHIPPED | OUTPATIENT
Start: 2023-01-09 | End: 2023-02-08

## 2023-01-09 RX ORDER — AMLODIPINE BESYLATE 10 MG/1
10 TABLET ORAL
Status: COMPLETED | OUTPATIENT
Start: 2023-01-09 | End: 2023-01-09

## 2023-01-09 RX ORDER — LISINOPRIL 20 MG/1
20 TABLET ORAL DAILY
Qty: 30 TABLET | Refills: 0 | Status: SHIPPED | OUTPATIENT
Start: 2023-01-09 | End: 2023-02-08

## 2023-01-09 RX ADMIN — AMLODIPINE BESYLATE 10 MG: 10 TABLET ORAL at 08:01

## 2023-01-09 RX ADMIN — ACETAMINOPHEN 650 MG: 325 TABLET ORAL at 08:01

## 2023-01-09 NOTE — PROVIDER PROGRESS NOTES - EMERGENCY DEPT.
Encounter Date: 1/9/2023    ED Physician Progress Notes         EKG - STEMI Decision  Initial Reading: No STEMI present.

## 2023-01-09 NOTE — DISCHARGE INSTRUCTIONS
Your workup today does not show any significant abnormality.  From reading through your chart, you are supposed to be taking amlodipine 10 mg and lisinopril 20 mg daily.  I have given you a refill for both prescriptions.  Keep a blood pressure journal. Follow-up closely with your primary doctor or return to the emergency department sooner for any new or worsening symptoms.

## 2023-01-09 NOTE — ED PROVIDER NOTES
Encounter Date: 1/9/2023       History     Chief Complaint   Patient presents with    Hypertension     Pt c/o hypertension of 200s/100s. Pt states he has unable to get his BP medication. C/o headache.      68 yo male with past medical history of hypertension, hyperlipidemia, obesity, GERD, obstructive sleep apnea presents to the emergency department with chief complaint of hypertension.  He takes his blood pressure in the morning and at night.  Noticed that he was more elevated over the last few weeks.  He began taking 1 and half tablets of amlodipine.  This is a total of 15 mg per day.  He felt like this was improving his blood pressure, however he ran out of the amlodipine for the last 4 days.  He reports that his blood pressure this morning was 200/115.  He reports a left-sided posterior headache that began about 1 hour ago.  Pain was gradual in onset.  It is not the worse headache of his life.  He took Aleve with significant improvement of his symptoms.  He has a mild headache currently.  Denies vision changes, chest pain, shortness of breath, abdominal pain, vomiting, diarrhea.  Denies difficulty urinating or decreased urinary output.  He denies any other worsening or alleviating factors.    Review of patient's allergies indicates:   Allergen Reactions    Lactose intolerance  [lactase]      Other reaction(s): Diarrhea    Penicillins Other (See Comments)     Swelling of lymph nodes     Past Medical History:   Diagnosis Date    Allergy     Back pain     Hypertension     Mixed hyperlipidemia 7/3/2018    Obesity     Reflux     Sleep apnea 6/21/2013     Past Surgical History:   Procedure Laterality Date    CHOLECYSTECTOMY  2012    COLONOSCOPY N/A 1/3/2023    Procedure: COLONOSCOPY;  Surgeon: Kiera Fonseca MD;  Location: [facility];  Service: Endoscopy;  Laterality: N/A;    ESOPHAGOGASTRODUODENOSCOPY N/A 1/3/2023    Procedure: EGD (ESOPHAGOGASTRODUODENOSCOPY);  Surgeon: Kiera Fonseca MD;  Location: [facility]  ENDO;  Service: Endoscopy;  Laterality: N/A;    HERNIA REPAIR      RHINOPLASTY       Family History   Problem Relation Age of Onset    Cancer Mother 65        exposure related    Heart disease Father     Colon cancer Neg Hx     Esophageal cancer Neg Hx     Diabetes Neg Hx      Social History     Tobacco Use    Smoking status: Never    Smokeless tobacco: Never   Substance Use Topics    Alcohol use: Yes     Alcohol/week: 3.0 standard drinks     Types: 3 Cans of beer per week     Comment: 3 times per week    Drug use: Yes     Types: Marijuana     Comment: smoked last yuesterday     Review of Systems   Constitutional:  Negative for fever.   HENT:  Negative for sore throat.    Respiratory:  Negative for shortness of breath.    Cardiovascular:  Negative for chest pain.   Gastrointestinal:  Negative for nausea.   Genitourinary:  Negative for dysuria.   Musculoskeletal:  Negative for back pain.   Skin:  Negative for rash.   Neurological:  Positive for headaches. Negative for weakness.   Hematological:  Does not bruise/bleed easily.     Physical Exam     Initial Vitals [01/09/23 0631]   BP Pulse Resp Temp SpO2   (!) 172/95 (!) 52 18 97.7 °F (36.5 °C) 99 %      MAP       --         Physical Exam    Nursing note and vitals reviewed.  Constitutional: He appears well-developed and well-nourished. He is not diaphoretic. No distress.   HENT:   Head: Normocephalic and atraumatic.   Mouth/Throat: Oropharynx is clear and moist.   Eyes: EOM are normal. Pupils are equal, round, and reactive to light.   Neck: Neck supple.   Normal range of motion.  Cardiovascular:  Normal rate, regular rhythm, normal heart sounds and intact distal pulses.     Exam reveals no gallop and no friction rub.       No murmur heard.  Pulmonary/Chest: Breath sounds normal. He has no wheezes. He has no rhonchi. He has no rales.   Abdominal: Abdomen is soft. Bowel sounds are normal. There is no abdominal tenderness.   Musculoskeletal:         General: Normal range  of motion.      Cervical back: Normal range of motion and neck supple.     Neurological: He is alert and oriented to person, place, and time. He has normal strength. GCS score is 15. GCS eye subscore is 4. GCS verbal subscore is 5. GCS motor subscore is 6.   Skin: Skin is warm and dry. Capillary refill takes less than 2 seconds.   Psychiatric: He has a normal mood and affect. His behavior is normal. Judgment and thought content normal.       ED Course   Procedures  Labs Reviewed   CBC W/ AUTO DIFFERENTIAL - Abnormal; Notable for the following components:       Result Value    RBC 3.38 (*)     Hemoglobin 11.3 (*)     Hematocrit 34.6 (*)      (*)     MCH 33.4 (*)     Lymph # 0.9 (*)     All other components within normal limits   COMPREHENSIVE METABOLIC PANEL - Abnormal; Notable for the following components:    Potassium 3.3 (*)     All other components within normal limits     EKG Readings: (Independently Interpreted)   Initial Reading: No STEMI. Rhythm: Sinus Bradycardia. Heart Rate: 49.     Imaging Results              CT Head Without Contrast (Final result)  Result time 01/09/23 08:35:37      Final result by Tadeo Reese MD (01/09/23 08:35:37)                   Impression:      No evidence of acute intracranial hemorrhage.    Chronic small vessel ischemic change.    Low lying cerebellar tonsils, with configuration similar to the prior MRI 04/15/2011.  For clinical correlation.    Sinusitis.      Electronically signed by: Tadeo Reese MD  Date:    01/09/2023  Time:    08:35               Narrative:    EXAMINATION:  CT HEAD WITHOUT CONTRAST    CLINICAL HISTORY:  Headache, new or worsening (Age >= 50y);    TECHNIQUE:  Low dose axial CT images obtained throughout the head without the use of intravenous contrast.  Axial, sagittal and coronal reconstructions were performed.    COMPARISON:  MRI 04/15/2011    FINDINGS:  Intracranial compartment:    Ventricles and sulci are normal in size for age, without  evidence of hydrocephalus.    Patchy hypoattenuation in the supratentorial white matter, nonspecific but most likely reflecting chronic small vessel ischemic changes. No recent or remote major vascular distribution infarct.    No acute hemorrhage.    Mildly low lying cerebellar tonsils similar to prior.  These extent below the foramen magnum again just reaching the upper margin the posterior arch of C1 vertebral segment.  No mass effect or midline shift.    No extra-axial blood or fluid collections.    Skull/extracranial contents (limited evaluation):    No displaced calvarial fracture.    Mastoid air cells are clear. Lobular mucosal thickening and patchy secretions in the visualized paranasal sinuses.                                       Medications   acetaminophen tablet 650 mg (650 mg Oral Given 1/9/23 0829)   amLODIPine tablet 10 mg (10 mg Oral Given 1/9/23 0829)     Medical Decision Making:   History:   Old Medical Records: I decided to obtain old medical records.  Initial Assessment:   Emergent evaluation of a 67 y.o. male presenting to the emergency department complaining of hypertension for the last few weeks. He has recently increased from 10 milligrams of amlodipine to 15 milligrams daily.  He ran out of his amlodipine 4 days ago.  He started with a posterior headache this morning, about 1 hour prior to arrival.  It was gradual in onset.  Not thunderclap in nature.  Not the worst headache of his life.  It was somewhat improved with Aleve. Patient is afebrile, hemodynamically stable, and non toxic appearing.  Will order labs and imaging and analgesia.    Differential Diagnosis:   Differential diagnosis includes but is not limited to hypertension, SAH, metabolic derangement.  Independently Interpreted Test(s):   I have ordered and independently interpreted EKG Reading(s) - see prior notes  Clinical Tests:   Lab Tests: Ordered and Reviewed  Radiological Study: Ordered and Reviewed  Medical Tests: Ordered and  Reviewed  ED Management:  No severe metabolic or hematologic derangement.  Kidney function baseline.  CT head without acute abnormality.  Chronic changes noted.  Reports improvement of symptoms with ED management.  Upon reviewing the patient's chart, he had an appointment with his PCP several months ago.  His PCP recommended that he start lisinopril 20 milligrams daily.  He has not yet taken this medication.  I will give him a refill for his amlodipine as well as lisinopril.  Instructed to take this daily.  He is also instructed eat a low-salt diet and keep a blood pressure journal.  He will follow-up closely with his PCP for further management.  Return precautions.  All questions answered.  The patient was instructed to follow up with a primary care provider or to return to the emergency department for worsening symptoms. The treatment plan was discussed with the patient who demonstrated understanding and comfort with plan. The patient's history, physical exam, and plan of care was discussed with and agreed upon with my supervising physician.                           Clinical Impression:   Final diagnoses:  [R51.9] Occipital headache (Primary)  [I10] Hypertension, unspecified type        ED Disposition Condition    Discharge Stable          ED Prescriptions       Medication Sig Dispense Start Date End Date Auth. Provider    lisinopriL (PRINIVIL,ZESTRIL) 20 MG tablet Take 1 tablet (20 mg total) by mouth once daily. 30 tablet 1/9/2023 2/8/2023 Libia Khan PA-C    amLODIPine (NORVASC) 10 MG tablet Take 1 tablet (10 mg total) by mouth once daily. 30 tablet 1/9/2023 2/8/2023 Libia Khan PA-C          Follow-up Information       Follow up With Specialties Details Why Contact Info    [facility] - Emergency Dept Emergency Medicine Go to  If symptoms worsen [address]  [facility] [id number] [phone]    Edi Mcneil MD Internal Medicine, Pediatrics Schedule an appointment as soon  as possible for a visit in 3 days  [address] [facility]  [place] [address]  [phone]               Libia Khan PA-C  01/09/23 1149

## 2023-01-09 NOTE — ED TRIAGE NOTES
Isreal Randolph, a 67 y.o. male presents to the ED w/ complaint of hypertension. Pt reports elevated BP x 3 days. Checks every morning, highest at home reported systolic 210s. Takes 10 mgs amlodipine, decided to take 5 mg in addition, reports it has been helping decrease BP. Currently out of meds. Has not been able to reach primary care provider. Denies chest pain, SOB.     Triage note:  Chief Complaint   Patient presents with    Hypertension     Pt c/o hypertension of 200s/100s. Pt states he has unable to get his BP medication. C/o headache.      Review of patient's allergies indicates:   Allergen Reactions    Lactose intolerance  [lactase]      Other reaction(s): Diarrhea    Penicillins Other (See Comments)     Swelling of lymph nodes     Past Medical History:   Diagnosis Date    Allergy     Back pain     Hypertension     Mixed hyperlipidemia 7/3/2018    Obesity     Reflux     Sleep apnea 6/21/2013

## 2023-01-09 NOTE — ED NOTES
LOC: The patient is awake, alert, and oriented to self, place, time, and situation. Pt is calm and cooperative. Affect is appropriate.  Speech is appropriate and clear.     APPEARANCE: Patient resting comfortably in no acute distress.  Patient is clean and well groomed.    SKIN: The skin is warm and dry; color consistent with ethnicity.  Patient has normal skin turgor and moist mucus membranes.  Skin intact; no breakdown or bruising noted.     MUSCULOSKELETAL: Patient moving upper and lower extremities without difficulty; denies pain in the extremities or back.  Denies weakness.     RESPIRATORY: Airway is open and patent. Respirations spontaneous, even, easy, and non-labored.  Patient has a normal effort and rate.  No accessory muscle use noted. Denies cough.     CARDIAC:  Hypertension reported. Normal rate noted.  No peripheral edema noted. No complaints of chest pain.     ABDOMEN: Soft and non tender to palpation.  No distention noted. Pt denies abdominal pain; denies nausea, vomiting, diarrhea, or constipation.    NEUROLOGIC: Eyes open spontaneously.  Behavior appropriate to situation.  Follows commands; facial expression symmetrical.  Purposeful motor response noted; normal sensation in all extremities. Pt denies headache; denies lightheadedness or dizziness; denies visual disturbances; denies loss of balance; denies unilateral weakness.

## 2023-02-07 NOTE — PATIENT INSTRUCTIONS
Hold amlodipine for now. Start losartan-hydrochlorothiazide. Continue to check your blood pressure every day, keep a log, and bring the log and blood pressure cuff to your meeting with health .    Greater than or equal to 4 cm

## 2023-02-09 DIAGNOSIS — I10 ESSENTIAL HYPERTENSION: Chronic | ICD-10-CM

## 2023-02-09 DIAGNOSIS — I10 HYPERTENSION, UNSPECIFIED TYPE: ICD-10-CM

## 2023-02-09 NOTE — TELEPHONE ENCOUNTER
----- Message from Suzie Cueva sent at 2/9/2023  3:21 PM CST -----  Type: RX Refill Request    Who Called: pt requesting to get refills. Pt is completely out of medication. Call pt     Have you contacted your pharmacy:    Refill or New Rx:amLODIPine (NORVASC) 10 MG tablet - needs pa    lisinopriL (PRINIVIL,ZESTRIL) 20 MG tablet - needs pa    RX Name and Strength:    How is the patient currently taking it? (ex. 1XDay):    Is this a 30 day or 90 day RX:    Preferred Pharmacy with phone number:  [facility]  [address] [facility] [address]      Local or Mail Order:    Ordering Provider:    Would the patient rather a call back or a response via My [facility]?     Best Call Back Number:[phone] (home)       Additional Information:

## 2023-02-09 NOTE — TELEPHONE ENCOUNTER
No new care gaps identified.  [facility] Embedded Care Gaps. Reference number: [id number]. 2/09/2023   3:49:57 PM CST

## 2023-02-10 RX ORDER — LISINOPRIL 20 MG/1
20 TABLET ORAL DAILY
Qty: 90 TABLET | Refills: 1 | Status: SHIPPED | OUTPATIENT
Start: 2023-02-10 | End: 2023-03-23 | Stop reason: SDUPTHER

## 2023-02-10 RX ORDER — AMLODIPINE BESYLATE 10 MG/1
10 TABLET ORAL DAILY
Qty: 90 TABLET | Refills: 1 | Status: SHIPPED | OUTPATIENT
Start: 2023-02-10 | End: 2023-03-23 | Stop reason: SDUPTHER

## 2023-02-10 NOTE — TELEPHONE ENCOUNTER
Refill Routing Note   Medication(s) are not appropriate for processing by [facility] for the following reason(s):         Requires lab  ED/Hospital Visit since last OV with PCP  Required vitals abnormal    [facility] action(s):  Defer                   Appointments  past 12m or future 3m with PCP    Date Provider   Last Visit   6/14/2022 Jamaal Tellez MD   Next Visit   Visit date not found Jamaal Tellez MD   ED visits in past 90 days: 1        Note composed:9:07 PM 02/09/2023

## 2023-02-10 NOTE — TELEPHONE ENCOUNTER
Attempted to contact patient to schedule office for medication refill. No answer message left  to call clinic back.

## 2023-02-13 ENCOUNTER — TELEPHONE (OUTPATIENT)
Dept: FAMILY MEDICINE | Facility: CLINIC | Age: 68
End: 2023-02-13
Payer: MEDICARE

## 2023-02-13 NOTE — TELEPHONE ENCOUNTER
----- Message from Jayramon Andriy sent at 2/13/2023 11:22 AM CST -----  Regarding: [facility]  .Type:  RX Refill Request    Who Called: [facility]/ [facility]    Refill or New Rx:refill    RX Name and Strength:amLODIPine (NORVASC) 10 MG tablet, lisinopriL (PRINIVIL,ZESTRIL) 20 MG tablet    How is the patient currently taking it? (ex. 1XDay):once daily    Is this a 30 day or 90 day RX:30 day    Preferred Pharmacy with phone number:  [facility]  [address] [phone]    Local or Mail Order:locallocal    Ordering Provider:mariela bolden    Would the patient rather a call back or a response via [facility]? Call back    Best Call Back Number [phone]     Additional Information: PLEASE GIVE PT A CALL WHEN COMPLETED

## 2023-02-13 NOTE — TELEPHONE ENCOUNTER
----- Message from Tess De La O sent at 2/13/2023  2:27 PM CST -----  Regarding: Refill Request  Type: RX Refill Request      Who Called: Self       Refill or New Rx: refill       RX Name and Strength: lisinopriL (PRINIVIL,ZESTRIL) 20 MG tablet and amLODIPine (NORVASC) 10 MG tablet        Preferred Pharmacy with phone number: .  [facility] in [place]   [phone]        Would the patient rather a call back or a response via [facility]? Call       Best Call Back Number: [phone]          Additional Information: Please send to [facility] in [place]

## 2023-03-23 ENCOUNTER — OFFICE VISIT (OUTPATIENT)
Dept: FAMILY MEDICINE | Facility: CLINIC | Age: 68
End: 2023-03-23
Payer: MEDICARE

## 2023-03-23 VITALS
HEART RATE: 54 BPM | TEMPERATURE: 99 F | WEIGHT: 145.5 LBS | BODY MASS INDEX: 22.84 KG/M2 | HEIGHT: 67 IN | SYSTOLIC BLOOD PRESSURE: 139 MMHG | OXYGEN SATURATION: 99 % | DIASTOLIC BLOOD PRESSURE: 76 MMHG

## 2023-03-23 DIAGNOSIS — I70.0 AORTIC ATHEROSCLEROSIS: ICD-10-CM

## 2023-03-23 DIAGNOSIS — G47.00 INSOMNIA, UNSPECIFIED TYPE: Primary | ICD-10-CM

## 2023-03-23 DIAGNOSIS — I10 HYPERTENSION, UNSPECIFIED TYPE: ICD-10-CM

## 2023-03-23 DIAGNOSIS — I10 ESSENTIAL HYPERTENSION: Chronic | ICD-10-CM

## 2023-03-23 DIAGNOSIS — G47.33 OSA (OBSTRUCTIVE SLEEP APNEA): ICD-10-CM

## 2023-03-23 PROCEDURE — 1159F MED LIST DOCD IN RCRD: CPT | Mod: CPTII,S$GLB,, | Performed by: FAMILY MEDICINE

## 2023-03-23 PROCEDURE — 99499 UNLISTED E&M SERVICE: CPT | Mod: S$GLB,,, | Performed by: FAMILY MEDICINE

## 2023-03-23 PROCEDURE — 1101F PR PT FALLS ASSESS DOC 0-1 FALLS W/OUT INJ PAST YR: ICD-10-PCS | Mod: CPTII,S$GLB,, | Performed by: FAMILY MEDICINE

## 2023-03-23 PROCEDURE — 3075F PR MOST RECENT SYSTOLIC BLOOD PRESS GE 130-139MM HG: ICD-10-PCS | Mod: CPTII,S$GLB,, | Performed by: FAMILY MEDICINE

## 2023-03-23 PROCEDURE — 1159F PR MEDICATION LIST DOCUMENTED IN MEDICAL RECORD: ICD-10-PCS | Mod: CPTII,S$GLB,, | Performed by: FAMILY MEDICINE

## 2023-03-23 PROCEDURE — 1101F PT FALLS ASSESS-DOCD LE1/YR: CPT | Mod: CPTII,S$GLB,, | Performed by: FAMILY MEDICINE

## 2023-03-23 PROCEDURE — 4010F ACE/ARB THERAPY RXD/TAKEN: CPT | Mod: CPTII,S$GLB,, | Performed by: FAMILY MEDICINE

## 2023-03-23 PROCEDURE — 99214 PR OFFICE/OUTPT VISIT, EST, LEVL IV, 30-39 MIN: ICD-10-PCS | Mod: S$GLB,,, | Performed by: FAMILY MEDICINE

## 2023-03-23 PROCEDURE — 3008F PR BODY MASS INDEX (BMI) DOCUMENTED: ICD-10-PCS | Mod: CPTII,S$GLB,, | Performed by: FAMILY MEDICINE

## 2023-03-23 PROCEDURE — 4010F PR ACE/ARB THEARPY RXD/TAKEN: ICD-10-PCS | Mod: CPTII,S$GLB,, | Performed by: FAMILY MEDICINE

## 2023-03-23 PROCEDURE — 99499 RISK ADDL DX/OHS AUDIT: ICD-10-PCS | Mod: S$GLB,,, | Performed by: FAMILY MEDICINE

## 2023-03-23 PROCEDURE — 3008F BODY MASS INDEX DOCD: CPT | Mod: CPTII,S$GLB,, | Performed by: FAMILY MEDICINE

## 2023-03-23 PROCEDURE — 3288F FALL RISK ASSESSMENT DOCD: CPT | Mod: CPTII,S$GLB,, | Performed by: FAMILY MEDICINE

## 2023-03-23 PROCEDURE — 3078F PR MOST RECENT DIASTOLIC BLOOD PRESSURE < 80 MM HG: ICD-10-PCS | Mod: CPTII,S$GLB,, | Performed by: FAMILY MEDICINE

## 2023-03-23 PROCEDURE — 99999 PR PBB SHADOW E&M-EST. PATIENT-LVL III: ICD-10-PCS | Mod: PBBFAC,,, | Performed by: FAMILY MEDICINE

## 2023-03-23 PROCEDURE — 99214 OFFICE O/P EST MOD 30 MIN: CPT | Mod: S$GLB,,, | Performed by: FAMILY MEDICINE

## 2023-03-23 PROCEDURE — 1126F AMNT PAIN NOTED NONE PRSNT: CPT | Mod: CPTII,S$GLB,, | Performed by: FAMILY MEDICINE

## 2023-03-23 PROCEDURE — 3078F DIAST BP <80 MM HG: CPT | Mod: CPTII,S$GLB,, | Performed by: FAMILY MEDICINE

## 2023-03-23 PROCEDURE — 3075F SYST BP GE 130 - 139MM HG: CPT | Mod: CPTII,S$GLB,, | Performed by: FAMILY MEDICINE

## 2023-03-23 PROCEDURE — 1126F PR PAIN SEVERITY QUANTIFIED, NO PAIN PRESENT: ICD-10-PCS | Mod: CPTII,S$GLB,, | Performed by: FAMILY MEDICINE

## 2023-03-23 PROCEDURE — 99999 PR PBB SHADOW E&M-EST. PATIENT-LVL III: CPT | Mod: PBBFAC,,, | Performed by: FAMILY MEDICINE

## 2023-03-23 PROCEDURE — 3288F PR FALLS RISK ASSESSMENT DOCUMENTED: ICD-10-PCS | Mod: CPTII,S$GLB,, | Performed by: FAMILY MEDICINE

## 2023-03-23 RX ORDER — LISINOPRIL 40 MG/1
40 TABLET ORAL DAILY
Qty: 90 TABLET | Refills: 3 | Status: SHIPPED | OUTPATIENT
Start: 2023-03-23 | End: 2023-08-09 | Stop reason: SDUPTHER

## 2023-03-23 RX ORDER — AMLODIPINE BESYLATE 10 MG/1
10 TABLET ORAL DAILY
Qty: 90 TABLET | Refills: 3 | Status: SHIPPED | OUTPATIENT
Start: 2023-03-23 | End: 2023-08-09 | Stop reason: SDUPTHER

## 2023-03-23 RX ORDER — TRAZODONE HYDROCHLORIDE 50 MG/1
50 TABLET ORAL NIGHTLY PRN
Qty: 30 TABLET | Refills: 3 | Status: SHIPPED | OUTPATIENT
Start: 2023-03-23 | End: 2023-09-01

## 2023-03-23 NOTE — PROGRESS NOTES
Dr. Colin paged and notified of CI=1.8-2.0.  All hemodynamic parameters and current supports reviewed with Dr. Colin.  Dobutmaine infusing at 3 mcg/kg/min.  Per Dr. Colin, keep dobutamine at 3 overnight w/ no wean. Page if unable to obtain CI>1.8 or SvO2>60.   [facility]  Progress Note    SUBJECTIVE:     Chief Complaint   Patient presents with    Hypertension    Insomnia       HPI   Isreal Randolph  is a 67 y.o. male here for c/o insomnia. Hasn't been able to get much sleep. Gets only a few hours at night and is fragmented. Also here for follow-up of his hypertension. Patient has no other new complaints/problems at this time.      Review of patient's allergies indicates:   Allergen Reactions    Lactose intolerance  [lactase]      Other reaction(s): Diarrhea    Penicillins Other (See Comments)     Swelling of lymph nodes       Past Medical History:   Diagnosis Date    Allergy     Back pain     Hypertension     Mixed hyperlipidemia 7/3/2018    Obesity     Reflux     Sleep apnea 6/21/2013     Past Surgical History:   Procedure Laterality Date    CHOLECYSTECTOMY  2012    COLONOSCOPY N/A 1/3/2023    Procedure: COLONOSCOPY;  Surgeon: Kiera Fonseca MD;  Location: [facility];  Service: Endoscopy;  Laterality: N/A;    ESOPHAGOGASTRODUODENOSCOPY N/A 1/3/2023    Procedure: EGD (ESOPHAGOGASTRODUODENOSCOPY);  Surgeon: Kiera Fonseca MD;  Location: [facility];  Service: Endoscopy;  Laterality: N/A;    HERNIA REPAIR      RHINOPLASTY       Family History   Problem Relation Age of Onset    Cancer Mother 65        exposure related    Heart disease Father     Colon cancer Neg Hx     Esophageal cancer Neg Hx     Diabetes Neg Hx      Social History     Tobacco Use    Smoking status: Never    Smokeless tobacco: Never   Substance Use Topics    Alcohol use: Yes     Alcohol/week: 3.0 standard drinks     Types: 3 Cans of beer per week     Comment: 3 times per week    Drug use: Yes     Types: Marijuana     Comment: smoked last yuesterday        Review of Systems   Constitutional:  Positive for malaise/fatigue. Negative for chills and fever.   HENT: Negative.     Respiratory: Negative.  Negative for shortness of breath.    Cardiovascular: Negative.  Negative for chest pain.    Gastrointestinal: Negative.  Negative for abdominal pain, nausea and vomiting.   Genitourinary: Negative.    Neurological:  Negative for headaches.   Psychiatric/Behavioral:  The patient has insomnia.    All other systems reviewed and are negative.  OBJECTIVE:     Vitals:    03/23/23 1328   BP: 139/76   Pulse:    Temp:      Body mass index is 22.79 kg/m².    Physical Exam  Constitutional:       General: He is not in acute distress.     Appearance: He is not diaphoretic.   HENT:      Head: Normocephalic and atraumatic.   Eyes:      Conjunctiva/sclera: Conjunctivae normal.   Pulmonary:      Effort: Pulmonary effort is normal. No respiratory distress.   Skin:     General: Skin is warm.   Neurological:      Mental Status: He is alert and oriented to person, place, and time.       Old records were reviewed. Labs and/or images were independently reviewed.    ASSESSMENT     1. Insomnia, unspecified type    2. KHARI (obstructive sleep apnea)    3. Aortic atherosclerosis    4. Essential hypertension    5. Hypertension, unspecified type        PLAN:     Insomnia, unspecified type  -     Start traZODone (DESYREL) 50 MG tablet; Take 1 tablet (50 mg total) by mouth nightly as needed for Insomnia.  Dispense: 30 tablet; Refill: 3  -    I have discussed the common side effects of this medication with the patient and answered all of the questions they had at the time of this visit regarding this medication.  -     Educated patient about good sleep hygiene habits. No cell phone or TV around bedtime and turn off all lights in room when going to sleep. Take medications as prescribed.    KHARI (obstructive sleep apnea)  -     Ambulatory referral/consult to Sleep Disorders; Future; Expected date: 03/30/2023    Aortic atherosclerosis   -     Stable. Continue current regimen.    Essential hypertension  -     Increase lisinopriL (PRINIVIL,ZESTRIL) 40 MG tablet; Take 1 tablet (40 mg total) by mouth once daily.  Dispense: 90 tablet; Refill:  3  -     amLODIPine (NORVASC) 10 MG tablet; Take 1 tablet (10 mg total) by mouth once daily.  Dispense: 90 tablet; Refill: 3  -     Educated patient to take medications as prescribed, and to record bp logs daily to bring along to next visit. Instructed patient to decrease salt intake. Also told patient to call if any new signs or symptoms develop.    RTC MALCOLM Tellez MD  03/24/2023 1:35 PM

## 2023-03-28 ENCOUNTER — PES CALL (OUTPATIENT)
Dept: ADMINISTRATIVE | Facility: CLINIC | Age: 68
End: 2023-03-28
Payer: MEDICARE

## 2023-06-20 ENCOUNTER — PES CALL (OUTPATIENT)
Dept: ADMINISTRATIVE | Facility: CLINIC | Age: 68
End: 2023-06-20
Payer: MEDICARE

## 2023-08-09 ENCOUNTER — TELEPHONE (OUTPATIENT)
Dept: FAMILY MEDICINE | Facility: CLINIC | Age: 68
End: 2023-08-09
Payer: MEDICARE

## 2023-08-09 ENCOUNTER — HOSPITAL ENCOUNTER (EMERGENCY)
Facility: HOSPITAL | Age: 68
Discharge: HOME | End: 2023-08-09
Attending: EMERGENCY MEDICINE
Payer: MEDICARE

## 2023-08-09 VITALS
TEMPERATURE: 98 F | HEART RATE: 58 BPM | BODY MASS INDEX: 23.3 KG/M2 | SYSTOLIC BLOOD PRESSURE: 208 MMHG | OXYGEN SATURATION: 98 % | DIASTOLIC BLOOD PRESSURE: 109 MMHG | RESPIRATION RATE: 18 BRPM | HEIGHT: 66 IN | WEIGHT: 145 LBS

## 2023-08-09 DIAGNOSIS — Z76.0 MEDICATION REFILL: ICD-10-CM

## 2023-08-09 DIAGNOSIS — I10 ESSENTIAL HYPERTENSION: Chronic | ICD-10-CM

## 2023-08-09 DIAGNOSIS — M54.32 BILATERAL SCIATICA: ICD-10-CM

## 2023-08-09 DIAGNOSIS — I10 HYPERTENSION, UNSPECIFIED TYPE: Primary | ICD-10-CM

## 2023-08-09 DIAGNOSIS — M54.31 BILATERAL SCIATICA: ICD-10-CM

## 2023-08-09 PROCEDURE — 25000003 PHARM REV CODE 250: Performed by: PHYSICIAN ASSISTANT

## 2023-08-09 PROCEDURE — 99283 EMERGENCY DEPT VISIT LOW MDM: CPT

## 2023-08-09 RX ORDER — AMLODIPINE BESYLATE 10 MG/1
10 TABLET ORAL
Status: COMPLETED | OUTPATIENT
Start: 2023-08-09 | End: 2023-08-09

## 2023-08-09 RX ORDER — NAPROXEN 500 MG/1
500 TABLET ORAL 2 TIMES DAILY PRN
Qty: 30 TABLET | Refills: 0 | Status: SHIPPED | OUTPATIENT
Start: 2023-08-09

## 2023-08-09 RX ORDER — LISINOPRIL 40 MG/1
40 TABLET ORAL DAILY
Qty: 30 TABLET | Refills: 1 | Status: SHIPPED | OUTPATIENT
Start: 2023-08-09 | End: 2023-11-06 | Stop reason: SDUPTHER

## 2023-08-09 RX ORDER — LISINOPRIL 20 MG/1
40 TABLET ORAL
Status: COMPLETED | OUTPATIENT
Start: 2023-08-09 | End: 2023-08-09

## 2023-08-09 RX ORDER — AMLODIPINE BESYLATE 10 MG/1
10 TABLET ORAL DAILY
Qty: 30 TABLET | Refills: 1 | Status: SHIPPED | OUTPATIENT
Start: 2023-08-09 | End: 2023-11-06 | Stop reason: SDUPTHER

## 2023-08-09 RX ORDER — NAPROXEN 500 MG/1
500 TABLET ORAL
Status: COMPLETED | OUTPATIENT
Start: 2023-08-09 | End: 2023-08-09

## 2023-08-09 RX ADMIN — LISINOPRIL 40 MG: 20 TABLET ORAL at 06:08

## 2023-08-09 RX ADMIN — NAPROXEN 500 MG: 500 TABLET ORAL at 06:08

## 2023-08-09 RX ADMIN — AMLODIPINE BESYLATE 10 MG: 10 TABLET ORAL at 06:08

## 2023-08-09 NOTE — TELEPHONE ENCOUNTER
----- Message from Yany Kelley sent at 8/9/2023  8:47 AM CDT -----  Regarding: Refill request  .Type: RX Refill Request    Who Called:self    Have you contacted your pharmacy:no     Refill or New Rx: Refill    RX Name and Strength:naproxen (NAPROSYN) 500 MG tablet, amLODIPine (NORVASC) 10 MG tablet, traZODone (DESYREL) 50 MG tablet and lisinopriL (PRINIVIL,ZESTRIL) 40 MG tablet     Preferred Pharmacy with phone number: .  [facility] [address] [place] [address] [facility]  [address] [facility] [address]  Phone: [phone] Fax: [phone]    Local or Mail Order:local     Ordering Provider:ER     Would the patient rather a call back or a response via [facility]? Call     Best Call Back Number: .[phone]      Additional Information: States he lost his paper prescription he received from the ER this morning; needs a new refill request

## 2023-08-09 NOTE — DISCHARGE INSTRUCTIONS

## 2023-08-09 NOTE — ED TRIAGE NOTES
Isreal Randolph, a 67 y.o. male presents to the ED w/ complaint of high blood pressure in need of medication refills.    Triage note:  Chief Complaint   Patient presents with    Hypertension     States moved and lost his bp meds and his naproxen     Review of patient's allergies indicates:   Allergen Reactions    Lactose intolerance  [lactase]      Other reaction(s): Diarrhea    Penicillins Other (See Comments)     Swelling of lymph nodes     Past Medical History:   Diagnosis Date    Allergy     Back pain     Hypertension     Mixed hyperlipidemia 7/3/2018    Obesity     Reflux     Sleep apnea 6/21/2013      Patient identifiers for Isreal Randolph checked and correct.    LOC: The patient is awake, alert and aware of environment with an appropriate affect, the patient is oriented x 4 and speaking appropriately.    APPEARANCE: Patient resting comfortably and in no acute distress, patient is clean and well groomed, patient's clothing is properly fastened.    SKIN: The skin is warm and dry, color consistent with ethnicity, patient has normal skin turgor and moist mucus membranes, skin intact, no breakdown or bruising noted.    MUSCULOSKELETAL: Patient moving all extremities well, no obvious swelling or deformities noted. Walks with limp at baseline.    RESPIRATORY: Airway is open and patent, respirations are spontaneous and even, patient has a normal effort and rate.    CARDIAC: Patient has a normal rate. Blood pressure high.    ABDOMEN: Soft and non tender to palpation, no distention noted. Patient denies any nausea, vomiting, diarrhea, or constipation.     NEUROLOGIC: Eyes open spontaneously, PERRL, behavior appropriate to situation, follows commands, facial expression symmetrical, bilateral hand grasp equal and even, purposeful motor response noted, normal sensation in all extremities.     HEENT: No abnormalities noted. White sclera and pupils equal round and reactive to light. Denies headache, dizziness.     : Pt voids  independently, denies dysuria, hematuria, frequency.

## 2023-08-09 NOTE — ED PROVIDER NOTES
Encounter Date: 8/9/2023       History     Chief Complaint   Patient presents with    Hypertension     States moved and lost his bp meds and his naproxen     67-year-old male to the ER requesting medication refill.  Blood pressure is elevated.  Patient losses prescription and has not had his meds in quite some time.  Denies any headaches, nausea or vomiting.  No visual disturbances.  He is also requesting a refill on naproxen for which he takes pain.  Appears well and nontoxic.  No chest pain, no shortness of breath.      Review of patient's allergies indicates:   Allergen Reactions    Lactose intolerance  [lactase]      Other reaction(s): Diarrhea    Penicillins Other (See Comments)     Swelling of lymph nodes     Past Medical History:   Diagnosis Date    Allergy     Back pain     Hypertension     Mixed hyperlipidemia 7/3/2018    Obesity     Reflux     Sleep apnea 6/21/2013     Past Surgical History:   Procedure Laterality Date    CHOLECYSTECTOMY  2012    COLONOSCOPY N/A 1/3/2023    Procedure: COLONOSCOPY;  Surgeon: Kiera Fonseca MD;  Location: [facility];  Service: Endoscopy;  Laterality: N/A;    ESOPHAGOGASTRODUODENOSCOPY N/A 1/3/2023    Procedure: EGD (ESOPHAGOGASTRODUODENOSCOPY);  Surgeon: Kiera Fonseca MD;  Location: [facility];  Service: Endoscopy;  Laterality: N/A;    HERNIA REPAIR      RHINOPLASTY       Family History   Problem Relation Age of Onset    Cancer Mother 65        exposure related    Heart disease Father     Colon cancer Neg Hx     Esophageal cancer Neg Hx     Diabetes Neg Hx      Social History     Tobacco Use    Smoking status: Never    Smokeless tobacco: Never   Substance Use Topics    Alcohol use: Yes     Alcohol/week: 3.0 standard drinks of alcohol     Types: 3 Cans of beer per week     Comment: 3 times per week    Drug use: Yes     Types: Marijuana     Comment: smoked last yuesterday     Review of Systems   Constitutional:  Negative for fever.   HENT:  Negative for sore throat.     Respiratory:  Negative for shortness of breath.    Cardiovascular:  Negative for chest pain.   Gastrointestinal:  Negative for nausea.   Genitourinary:  Negative for dysuria.   Musculoskeletal:  Negative for back pain.   Skin:  Negative for rash.   Neurological:  Negative for weakness.   Hematological:  Does not bruise/bleed easily.       Physical Exam     Initial Vitals [08/09/23 0535]   BP Pulse Resp Temp SpO2   (!) 208/109 (!) 58 18 98.3 °F (36.8 °C) 98 %      MAP       --         Physical Exam    Constitutional: Vital signs are normal. He appears well-developed and well-nourished.   HENT:   Head: Normocephalic and atraumatic.   Right Ear: Hearing normal.   Left Ear: Hearing normal.   Eyes: Conjunctivae are normal.   Cardiovascular:  Normal rate and regular rhythm.           Abdominal: Abdomen is soft. Bowel sounds are normal.   Musculoskeletal:         General: Normal range of motion.     Neurological: He is alert and oriented to person, place, and time.   Skin: Skin is warm and intact.   Psychiatric: He has a normal mood and affect. His speech is normal and behavior is normal. Cognition and memory are normal.         ED Course   Procedures  Labs Reviewed - No data to display       Imaging Results    None          Medications - No data to display  Medical Decision Making:   History:   Old Medical Records: I decided to obtain old medical records.  Old Records Summarized: records from clinic visits.  Initial Assessment:   67-year-old male requesting a medication refill  Differential Diagnosis:   Asymptomatic hypertension, medication refill, elevated blood pressure.  Chronic pain.  ED Management:  Plan:  Patient with asymptomatic hypertension secondary to medication noncompliance.  Elevated blood pressure in the ED as a result of patient not taking his medication.  Patient was given a refill of his blood pressure medication and naproxen.  He was advised to follow-up with a primary care doctor.  He did not have  any chest pain or shortness a breath.  There was no signs of hypertensive emergency or urgency.  He did not require further emergent workup or treatment at this time.  Return precautions were given.  Patient is stable for discharge.                          Clinical Impression:   Final diagnoses:  [I10] Hypertension, unspecified type (Primary)  [Z76.0] Medication refill        ED Disposition Condition    Discharge Stable          ED Prescriptions       Medication Sig Dispense Start Date End Date Auth. Provider    amLODIPine (NORVASC) 10 MG tablet Take 1 tablet (10 mg total) by mouth once daily. 30 tablet 8/9/2023 10/8/2023 Bridger Moncada PA-C    lisinopriL (PRINIVIL,ZESTRIL) 40 MG tablet Take 1 tablet (40 mg total) by mouth once daily. 30 tablet 8/9/2023 10/8/2023 Bridger Moncada PA-C    naproxen (NAPROSYN) 500 MG tablet Take 1 tablet (500 mg total) by mouth 2 (two) times daily as needed. 30 tablet 8/9/2023 -- Bridger Moncada PA-C          Follow-up Information       Follow up With Specialties Details Why Contact Info    Jamaal Tellez MD Family Medicine   [address] [place], [place] [address] [place] [address]  [phone]               Bridger Moncada PA-C  08/09/23 8693

## 2023-08-24 ENCOUNTER — OFFICE VISIT (OUTPATIENT)
Dept: FAMILY MEDICINE | Facility: CLINIC | Age: 68
End: 2023-08-24
Payer: MEDICARE

## 2023-08-24 VITALS
HEIGHT: 66 IN | OXYGEN SATURATION: 98 % | BODY MASS INDEX: 23.27 KG/M2 | DIASTOLIC BLOOD PRESSURE: 78 MMHG | TEMPERATURE: 98 F | WEIGHT: 144.81 LBS | HEART RATE: 45 BPM | SYSTOLIC BLOOD PRESSURE: 164 MMHG

## 2023-08-24 DIAGNOSIS — E78.5 HYPERLIPIDEMIA, UNSPECIFIED HYPERLIPIDEMIA TYPE: Chronic | ICD-10-CM

## 2023-08-24 DIAGNOSIS — M54.41 CHRONIC MIDLINE LOW BACK PAIN WITH RIGHT-SIDED SCIATICA: ICD-10-CM

## 2023-08-24 DIAGNOSIS — I10 ESSENTIAL HYPERTENSION: Chronic | ICD-10-CM

## 2023-08-24 DIAGNOSIS — G89.29 CHRONIC MIDLINE LOW BACK PAIN WITH RIGHT-SIDED SCIATICA: ICD-10-CM

## 2023-08-24 DIAGNOSIS — Z00.00 ROUTINE PHYSICAL EXAMINATION: Primary | ICD-10-CM

## 2023-08-24 DIAGNOSIS — R79.9 ABNORMAL FINDING OF BLOOD CHEMISTRY, UNSPECIFIED: ICD-10-CM

## 2023-08-24 DIAGNOSIS — Z12.5 ENCOUNTER FOR SCREENING FOR MALIGNANT NEOPLASM OF PROSTATE: ICD-10-CM

## 2023-08-24 PROCEDURE — 3078F DIAST BP <80 MM HG: CPT | Mod: CPTII,S$GLB,, | Performed by: FAMILY MEDICINE

## 2023-08-24 PROCEDURE — 3077F PR MOST RECENT SYSTOLIC BLOOD PRESSURE >= 140 MM HG: ICD-10-PCS | Mod: CPTII,S$GLB,, | Performed by: FAMILY MEDICINE

## 2023-08-24 PROCEDURE — 99214 PR OFFICE/OUTPT VISIT, EST, LEVL IV, 30-39 MIN: ICD-10-PCS | Mod: 25,S$GLB,, | Performed by: FAMILY MEDICINE

## 2023-08-24 PROCEDURE — 99397 PR PREVENTIVE VISIT,EST,65 & OVER: ICD-10-PCS | Mod: GZ,S$GLB,, | Performed by: FAMILY MEDICINE

## 2023-08-24 PROCEDURE — 99999 PR PBB SHADOW E&M-EST. PATIENT-LVL IV: ICD-10-PCS | Mod: PBBFAC,,, | Performed by: FAMILY MEDICINE

## 2023-08-24 PROCEDURE — 1125F AMNT PAIN NOTED PAIN PRSNT: CPT | Mod: CPTII,S$GLB,, | Performed by: FAMILY MEDICINE

## 2023-08-24 PROCEDURE — 1101F PT FALLS ASSESS-DOCD LE1/YR: CPT | Mod: CPTII,S$GLB,, | Performed by: FAMILY MEDICINE

## 2023-08-24 PROCEDURE — 3288F FALL RISK ASSESSMENT DOCD: CPT | Mod: CPTII,S$GLB,, | Performed by: FAMILY MEDICINE

## 2023-08-24 PROCEDURE — 3288F PR FALLS RISK ASSESSMENT DOCUMENTED: ICD-10-PCS | Mod: CPTII,S$GLB,, | Performed by: FAMILY MEDICINE

## 2023-08-24 PROCEDURE — 99999 PR PBB SHADOW E&M-EST. PATIENT-LVL IV: CPT | Mod: PBBFAC,,, | Performed by: FAMILY MEDICINE

## 2023-08-24 PROCEDURE — 4010F PR ACE/ARB THEARPY RXD/TAKEN: ICD-10-PCS | Mod: CPTII,S$GLB,, | Performed by: FAMILY MEDICINE

## 2023-08-24 PROCEDURE — 99397 PER PM REEVAL EST PAT 65+ YR: CPT | Mod: GZ,S$GLB,, | Performed by: FAMILY MEDICINE

## 2023-08-24 PROCEDURE — 1159F PR MEDICATION LIST DOCUMENTED IN MEDICAL RECORD: ICD-10-PCS | Mod: CPTII,S$GLB,, | Performed by: FAMILY MEDICINE

## 2023-08-24 PROCEDURE — 1101F PR PT FALLS ASSESS DOC 0-1 FALLS W/OUT INJ PAST YR: ICD-10-PCS | Mod: CPTII,S$GLB,, | Performed by: FAMILY MEDICINE

## 2023-08-24 PROCEDURE — 99214 OFFICE O/P EST MOD 30 MIN: CPT | Mod: 25,S$GLB,, | Performed by: FAMILY MEDICINE

## 2023-08-24 PROCEDURE — 3008F BODY MASS INDEX DOCD: CPT | Mod: CPTII,S$GLB,, | Performed by: FAMILY MEDICINE

## 2023-08-24 PROCEDURE — 1125F PR PAIN SEVERITY QUANTIFIED, PAIN PRESENT: ICD-10-PCS | Mod: CPTII,S$GLB,, | Performed by: FAMILY MEDICINE

## 2023-08-24 PROCEDURE — 4010F ACE/ARB THERAPY RXD/TAKEN: CPT | Mod: CPTII,S$GLB,, | Performed by: FAMILY MEDICINE

## 2023-08-24 PROCEDURE — 1159F MED LIST DOCD IN RCRD: CPT | Mod: CPTII,S$GLB,, | Performed by: FAMILY MEDICINE

## 2023-08-24 PROCEDURE — 3078F PR MOST RECENT DIASTOLIC BLOOD PRESSURE < 80 MM HG: ICD-10-PCS | Mod: CPTII,S$GLB,, | Performed by: FAMILY MEDICINE

## 2023-08-24 PROCEDURE — 3008F PR BODY MASS INDEX (BMI) DOCUMENTED: ICD-10-PCS | Mod: CPTII,S$GLB,, | Performed by: FAMILY MEDICINE

## 2023-08-24 PROCEDURE — 3077F SYST BP >= 140 MM HG: CPT | Mod: CPTII,S$GLB,, | Performed by: FAMILY MEDICINE

## 2023-08-24 RX ORDER — TRAMADOL HYDROCHLORIDE 50 MG/1
50 TABLET ORAL EVERY 12 HOURS PRN
Qty: 14 TABLET | Refills: 0 | Status: SHIPPED | OUTPATIENT
Start: 2023-08-24

## 2023-08-24 RX ORDER — ATORVASTATIN CALCIUM 40 MG/1
40 TABLET, FILM COATED ORAL DAILY
Qty: 90 TABLET | Refills: 3 | Status: SHIPPED | OUTPATIENT
Start: 2023-08-24 | End: 2024-08-23

## 2023-08-24 RX ORDER — METHYLPREDNISOLONE 4 MG/1
TABLET ORAL
Qty: 21 EACH | Refills: 0 | Status: SHIPPED | OUTPATIENT
Start: 2023-08-24 | End: 2023-09-14

## 2023-08-24 NOTE — PROGRESS NOTES
[facility]  Progress Note    SUBJECTIVE:     Chief Complaint   Patient presents with    Annual Exam    Sciatica       HPI   Isreal Randolph  is a 67 y.o. male here for physical exam. Also has issues with R sciatica which will be addressed below. Patient has no other new complaints/problems at this time.      Review of patient's allergies indicates:   Allergen Reactions    Lactose intolerance  [lactase]      Other reaction(s): Diarrhea    Penicillins Other (See Comments)     Swelling of lymph nodes       Past Medical History:   Diagnosis Date    Allergy     Back pain     Hypertension     Mixed hyperlipidemia 7/3/2018    Obesity     Reflux     Sleep apnea 6/21/2013     Past Surgical History:   Procedure Laterality Date    CHOLECYSTECTOMY  2012    COLONOSCOPY N/A 1/3/2023    Procedure: COLONOSCOPY;  Surgeon: Kiera Fonseca MD;  Location: [facility];  Service: Endoscopy;  Laterality: N/A;    ESOPHAGOGASTRODUODENOSCOPY N/A 1/3/2023    Procedure: EGD (ESOPHAGOGASTRODUODENOSCOPY);  Surgeon: Kiera Fonseca MD;  Location: [facility];  Service: Endoscopy;  Laterality: N/A;    HERNIA REPAIR      RHINOPLASTY       Family History   Problem Relation Age of Onset    Cancer Mother 65        exposure related    Heart disease Father     Colon cancer Neg Hx     Esophageal cancer Neg Hx     Diabetes Neg Hx      Social History     Tobacco Use    Smoking status: Never    Smokeless tobacco: Never   Substance Use Topics    Alcohol use: Yes     Alcohol/week: 3.0 standard drinks of alcohol     Types: 3 Cans of beer per week     Comment: 3 times per week    Drug use: Yes     Types: Marijuana     Comment: smoked last yuesterday        Review of Systems   Constitutional:  Negative for chills, diaphoresis and fever.   HENT:  Negative for congestion, ear pain and sore throat.    Eyes:  Negative for photophobia and discharge.   Respiratory:  Negative for cough, shortness of breath and wheezing.    Cardiovascular:  Negative for  chest pain and palpitations.   Gastrointestinal:  Negative for abdominal pain, constipation, diarrhea, nausea and vomiting.   Genitourinary:  Negative for dysuria and hematuria.   Musculoskeletal:  Positive for back pain (R sided sciatica). Negative for myalgias.   Skin:  Negative for itching and rash.   Neurological:  Negative for dizziness, sensory change, focal weakness, weakness and headaches.   All other systems reviewed and are negative.  OBJECTIVE:     Vitals:    08/24/23 1320   BP: (!) 164/78   Pulse:    Temp:      Body mass index is 23.38 kg/m².    Physical Exam  Constitutional:       General: He is in acute distress (mild).      Appearance: He is not diaphoretic.   HENT:      Head: Normocephalic and atraumatic.      Right Ear: Tympanic membrane and ear canal normal. No hemotympanum. Tympanic membrane is not perforated, erythematous or bulging.      Left Ear: Tympanic membrane and ear canal normal. No hemotympanum. Tympanic membrane is not perforated, erythematous or bulging.   Eyes:      Conjunctiva/sclera: Conjunctivae normal.      Pupils: Pupils are equal, round, and reactive to light.   Neck:      Thyroid: No thyromegaly.   Cardiovascular:      Rate and Rhythm: Normal rate and regular rhythm.      Heart sounds: Normal heart sounds. No murmur heard.     No friction rub. No gallop.   Pulmonary:      Effort: Pulmonary effort is normal. No respiratory distress.      Breath sounds: Normal breath sounds. No wheezing or rales.   Abdominal:      General: Bowel sounds are normal. There is no distension.      Palpations: Abdomen is soft.      Tenderness: There is no abdominal tenderness. There is no guarding or rebound.   Musculoskeletal:         General: Tenderness (to palpation of right lower buttocks, with increased pain on internal rotation of hip) present. Normal range of motion.   Skin:     General: Skin is warm.      Findings: No erythema or rash.   Neurological:      Mental Status: He is alert and  oriented to person, place, and time.     Old records were reviewed. Labs and/or images were independently reviewed.    ASSESSMENT     1. Routine physical examination    2. Chronic midline low back pain with right-sided sciatica    3. Essential hypertension    4. Hyperlipidemia, unspecified hyperlipidemia type    5. Abnormal finding of blood chemistry, unspecified    6. Encounter for screening for malignant neoplasm of prostate        PLAN:     Routine physical examination  -     CBC Auto Differential; Future  -     Comprehensive Metabolic Panel; Future  -     Hemoglobin A1C; Future  -     Lipid Panel; Future  -     TSH; Future  -     T4, Free; Future  -     PSA, Screening; Future; Expected date: 08/24/2023  -     We briefly discussed diet, exercise, and routine preventive exams. All questions and comments addressed.    RTC PRJOSE LUIS Tellez MD  08/24/2023 1:31 PM      Additional Evaluation & Management issues:    In addition to today's Annual Physical, patient has other medical issues that need to be addressed, as well as their associated prescription management that is separate from today's physical, as documented separately below.     HPI  Patient c/o lower back pain with radiation down R leg. Rates pain as severe. Walking/standing makes it worst. No falls/trauma. Walks with a limp. Tried otc meds without relief.     Review of Systems   Constitutional:  Negative for chills, diaphoresis and fever.   HENT:  Negative for congestion, ear pain and sore throat.    Eyes:  Negative for photophobia and discharge.   Respiratory:  Negative for cough, shortness of breath and wheezing.    Cardiovascular:  Negative for chest pain and palpitations.   Gastrointestinal:  Negative for abdominal pain, constipation, diarrhea, nausea and vomiting.   Genitourinary:  Negative for dysuria and hematuria.   Musculoskeletal:  Positive for back pain (R sided sciatica). Negative for myalgias.   Skin:  Negative for itching and rash.    Neurological:  Negative for dizziness, sensory change, focal weakness, weakness and headaches.   All other systems reviewed and are negative.    Physical Exam  Constitutional:       General: He is in acute distress (mild).      Appearance: He is not diaphoretic.   HENT:      Head: Normocephalic and atraumatic.      Right Ear: Tympanic membrane and ear canal normal. No hemotympanum. Tympanic membrane is not perforated, erythematous or bulging.      Left Ear: Tympanic membrane and ear canal normal. No hemotympanum. Tympanic membrane is not perforated, erythematous or bulging.   Eyes:      Conjunctiva/sclera: Conjunctivae normal.      Pupils: Pupils are equal, round, and reactive to light.   Neck:      Thyroid: No thyromegaly.   Cardiovascular:      Rate and Rhythm: Normal rate and regular rhythm.      Heart sounds: Normal heart sounds. No murmur heard.     No friction rub. No gallop.   Pulmonary:      Effort: Pulmonary effort is normal. No respiratory distress.      Breath sounds: Normal breath sounds. No wheezing or rales.   Abdominal:      General: Bowel sounds are normal. There is no distension.      Palpations: Abdomen is soft.      Tenderness: There is no abdominal tenderness. There is no guarding or rebound.   Musculoskeletal:         General: Tenderness (to palpation of right lower buttocks, with increased pain on internal rotation of hip) present. Normal range of motion.   Skin:     General: Skin is warm.      Findings: No erythema or rash.   Neurological:      Mental Status: He is alert and oriented to person, place, and time.     Chronic midline low back pain with right-sided sciatica  -     methylPREDNISolone (MEDROL DOSEPACK) 4 mg tablet; use as directed  Dispense: 21 each; Refill: 0  -     Ambulatory referral/consult to Physical/Occupational Therapy; Future; Expected date: 08/31/2023  -     traMADoL (ULTRAM) 50 mg tablet; Take 1 tablet (50 mg total) by mouth every 12 (twelve) hours as needed for Pain.   Dispense: 14 tablet; Refill: 0  -     Patient counseled on good posture and stretching exercises. Continue to place ice packs on affected areas 3-4 times daily. Take medications as prescribed. Instructed patient to call MD if symptoms persist or worsen.    Essential hypertension  -     CBC Auto Differential; Future  -     Comprehensive Metabolic Panel; Future  -     Hemoglobin A1C; Future  -     Lipid Panel; Future  -     TSH; Future  -     T4, Free; Future  -     PSA, Screening; Future; Expected date: 08/24/2023  -     Patient counseled on good posture and stretching exercises. Continue to place ice packs on affected areas 3-4 times daily. Take medications as prescribed. Instructed patient to call MD if symptoms persist or worsen.    Hyperlipidemia, unspecified hyperlipidemia type  -     atorvastatin (LIPITOR) 40 MG tablet; Take 1 tablet (40 mg total) by mouth once daily.  Dispense: 90 tablet; Refill: 3  -     Stable. Continue current regimen.    RTC PRN

## 2023-08-28 ENCOUNTER — HOSPITAL ENCOUNTER (EMERGENCY)
Facility: HOSPITAL | Age: 68
Discharge: HOME | End: 2023-08-28
Attending: EMERGENCY MEDICINE
Payer: MEDICARE

## 2023-08-28 VITALS
OXYGEN SATURATION: 97 % | SYSTOLIC BLOOD PRESSURE: 147 MMHG | BODY MASS INDEX: 23.3 KG/M2 | HEIGHT: 66 IN | HEART RATE: 69 BPM | DIASTOLIC BLOOD PRESSURE: 80 MMHG | RESPIRATION RATE: 16 BRPM | WEIGHT: 145 LBS | TEMPERATURE: 98 F

## 2023-08-28 DIAGNOSIS — R55 SYNCOPE: ICD-10-CM

## 2023-08-28 DIAGNOSIS — R06.02 SHORTNESS OF BREATH: ICD-10-CM

## 2023-08-28 DIAGNOSIS — I63.9 STROKE: ICD-10-CM

## 2023-08-28 PROBLEM — R20.0 LEFT SIDED NUMBNESS: Status: ACTIVE | Noted: 2023-08-28

## 2023-08-28 LAB
ALBUMIN SERPL BCP-MCNC: 4 G/DL (ref 3.5–5.2)
ALP SERPL-CCNC: 55 U/L (ref 55–135)
ALT SERPL W/O P-5'-P-CCNC: 16 U/L (ref 10–44)
ANION GAP SERPL CALC-SCNC: 9 MMOL/L (ref 8–16)
AST SERPL-CCNC: 27 U/L (ref 10–40)
BASOPHILS # BLD AUTO: 0.04 K/UL (ref 0–0.2)
BASOPHILS NFR BLD: 0.7 % (ref 0–1.9)
BILIRUB SERPL-MCNC: 0.4 MG/DL (ref 0.1–1)
BILIRUB UR QL STRIP: NEGATIVE
BNP SERPL-MCNC: 28 PG/ML (ref 0–99)
BUN SERPL-MCNC: 14 MG/DL (ref 8–23)
BUN SERPL-MCNC: 15 MG/DL (ref 6–30)
CALCIUM SERPL-MCNC: 10 MG/DL (ref 8.7–10.5)
CHLORIDE SERPL-SCNC: 103 MMOL/L (ref 95–110)
CHLORIDE SERPL-SCNC: 105 MMOL/L (ref 95–110)
CHOLEST SERPL-MCNC: 172 MG/DL (ref 120–199)
CHOLEST/HDLC SERPL: 2.3 {RATIO} (ref 2–5)
CK SERPL-CCNC: 243 U/L (ref 20–200)
CLARITY UR REFRACT.AUTO: CLEAR
CO2 SERPL-SCNC: 25 MMOL/L (ref 23–29)
COLOR UR AUTO: COLORLESS
CREAT SERPL-MCNC: 1.6 MG/DL (ref 0.5–1.4)
CREAT SERPL-MCNC: 1.7 MG/DL (ref 0.5–1.4)
CREAT SERPL-MCNC: 1.7 MG/DL (ref 0.5–1.4)
DIFFERENTIAL METHOD: ABNORMAL
EOSINOPHIL # BLD AUTO: 0.1 K/UL (ref 0–0.5)
EOSINOPHIL NFR BLD: 1.6 % (ref 0–8)
ERYTHROCYTE [DISTWIDTH] IN BLOOD BY AUTOMATED COUNT: 13.8 % (ref 11.5–14.5)
EST. GFR  (NO RACE VARIABLE): 46.4 ML/MIN/1.73 M^2
GLUCOSE SERPL-MCNC: 110 MG/DL (ref 70–110)
GLUCOSE SERPL-MCNC: 114 MG/DL (ref 70–110)
GLUCOSE UR QL STRIP: NEGATIVE
HCT VFR BLD AUTO: 33.3 % (ref 40–54)
HCT VFR BLD CALC: 33 %PCV (ref 36–54)
HCV AB SERPL QL IA: NORMAL
HDLC SERPL-MCNC: 75 MG/DL (ref 40–75)
HDLC SERPL: 43.6 % (ref 20–50)
HGB BLD-MCNC: 11 G/DL (ref 14–18)
HGB UR QL STRIP: NEGATIVE
HIV 1+2 AB+HIV1 P24 AG SERPL QL IA: NORMAL
IMM GRANULOCYTES # BLD AUTO: 0.01 K/UL (ref 0–0.04)
IMM GRANULOCYTES NFR BLD AUTO: 0.2 % (ref 0–0.5)
INR PPP: 1 (ref 0.8–1.2)
KETONES UR QL STRIP: NEGATIVE
LDLC SERPL CALC-MCNC: 82.8 MG/DL (ref 63–159)
LEUKOCYTE ESTERASE UR QL STRIP: NEGATIVE
LYMPHOCYTES # BLD AUTO: 1.3 K/UL (ref 1–4.8)
LYMPHOCYTES NFR BLD: 23.2 % (ref 18–48)
MAGNESIUM SERPL-MCNC: 2 MG/DL (ref 1.6–2.6)
MCH RBC QN AUTO: 32.4 PG (ref 27–31)
MCHC RBC AUTO-ENTMCNC: 33 G/DL (ref 32–36)
MCV RBC AUTO: 98 FL (ref 82–98)
MONOCYTES # BLD AUTO: 0.4 K/UL (ref 0.3–1)
MONOCYTES NFR BLD: 7.6 % (ref 4–15)
NEUTROPHILS # BLD AUTO: 3.8 K/UL (ref 1.8–7.7)
NEUTROPHILS NFR BLD: 66.7 % (ref 38–73)
NITRITE UR QL STRIP: NEGATIVE
NONHDLC SERPL-MCNC: 97 MG/DL
NRBC BLD-RTO: 0 /100 WBC
PH UR STRIP: 8 [PH] (ref 5–8)
PLATELET # BLD AUTO: 248 K/UL (ref 150–450)
PMV BLD AUTO: 9.7 FL (ref 9.2–12.9)
POC IONIZED CALCIUM: 1.27 MMOL/L (ref 1.06–1.42)
POC PTINR: 1 (ref 0.9–1.2)
POC PTWBT: 11.4 SEC (ref 9.7–14.3)
POC TCO2 (MEASURED): 26 MMOL/L (ref 23–29)
POTASSIUM BLD-SCNC: 3.1 MMOL/L (ref 3.5–5.1)
POTASSIUM SERPL-SCNC: 3.2 MMOL/L (ref 3.5–5.1)
PROT SERPL-MCNC: 7.3 G/DL (ref 6–8.4)
PROT UR QL STRIP: NEGATIVE
PROTHROMBIN TIME: 10.5 SEC (ref 9–12.5)
RBC # BLD AUTO: 3.4 M/UL (ref 4.6–6.2)
SAMPLE: ABNORMAL
SAMPLE: ABNORMAL
SAMPLE: NORMAL
SODIUM BLD-SCNC: 140 MMOL/L (ref 136–145)
SODIUM SERPL-SCNC: 139 MMOL/L (ref 136–145)
SP GR UR STRIP: 1.02 (ref 1–1.03)
TRIGL SERPL-MCNC: 71 MG/DL (ref 30–150)
TROPONIN I SERPL DL<=0.01 NG/ML-MCNC: 0.02 NG/ML (ref 0–0.03)
TSH SERPL DL<=0.005 MIU/L-ACNC: 0.92 UIU/ML (ref 0.4–4)
URN SPEC COLLECT METH UR: ABNORMAL
WBC # BLD AUTO: 5.68 K/UL (ref 3.9–12.7)

## 2023-08-28 PROCEDURE — 81003 URINALYSIS AUTO W/O SCOPE: CPT

## 2023-08-28 PROCEDURE — 99285 EMERGENCY DEPT VISIT HI MDM: CPT | Mod: 25

## 2023-08-28 PROCEDURE — 83880 ASSAY OF NATRIURETIC PEPTIDE: CPT | Performed by: PHYSICIAN ASSISTANT

## 2023-08-28 PROCEDURE — 80061 LIPID PANEL: CPT | Mod: 91

## 2023-08-28 PROCEDURE — 83735 ASSAY OF MAGNESIUM: CPT

## 2023-08-28 PROCEDURE — 80048 BASIC METABOLIC PNL TOTAL CA: CPT | Mod: XB

## 2023-08-28 PROCEDURE — 84443 ASSAY THYROID STIM HORMONE: CPT | Mod: 91

## 2023-08-28 PROCEDURE — 99291 CRITICAL CARE FIRST HOUR: CPT

## 2023-08-28 PROCEDURE — 93010 ELECTROCARDIOGRAM REPORT: CPT | Mod: ,,, | Performed by: INTERNAL MEDICINE

## 2023-08-28 PROCEDURE — 93005 ELECTROCARDIOGRAM TRACING: CPT

## 2023-08-28 PROCEDURE — 82565 ASSAY OF CREATININE: CPT

## 2023-08-28 PROCEDURE — 80053 COMPREHEN METABOLIC PANEL: CPT | Mod: 91

## 2023-08-28 PROCEDURE — 25500020 PHARM REV CODE 255: Performed by: EMERGENCY MEDICINE

## 2023-08-28 PROCEDURE — 85610 PROTHROMBIN TIME: CPT

## 2023-08-28 PROCEDURE — 82550 ASSAY OF CK (CPK): CPT

## 2023-08-28 PROCEDURE — 93010 ELECTROCARDIOGRAM REPORT: CPT | Mod: 76,,, | Performed by: INTERNAL MEDICINE

## 2023-08-28 PROCEDURE — 93010 EKG 12-LEAD: ICD-10-PCS | Mod: ,,, | Performed by: INTERNAL MEDICINE

## 2023-08-28 PROCEDURE — 84484 ASSAY OF TROPONIN QUANT: CPT

## 2023-08-28 PROCEDURE — 87389 HIV-1 AG W/HIV-1&-2 AB AG IA: CPT | Performed by: PHYSICIAN ASSISTANT

## 2023-08-28 PROCEDURE — 99900035 HC TECH TIME PER 15 MIN (STAT)

## 2023-08-28 PROCEDURE — 99283 EMERGENCY DEPT VISIT LOW MDM: CPT | Mod: ,,, | Performed by: PSYCHIATRY & NEUROLOGY

## 2023-08-28 PROCEDURE — 85025 COMPLETE CBC W/AUTO DIFF WBC: CPT

## 2023-08-28 PROCEDURE — 86803 HEPATITIS C AB TEST: CPT | Performed by: PHYSICIAN ASSISTANT

## 2023-08-28 PROCEDURE — 99283 PR EMERGENCY DEPT VISIT,LEVEL III: ICD-10-PCS | Mod: ,,, | Performed by: PSYCHIATRY & NEUROLOGY

## 2023-08-28 RX ADMIN — IOHEXOL 100 ML: 350 INJECTION, SOLUTION INTRAVENOUS at 06:08

## 2023-08-28 RX ADMIN — IOHEXOL 75 ML: 350 INJECTION, SOLUTION INTRAVENOUS at 08:08

## 2023-08-28 NOTE — HPI
"66 yo male with PMHx of HTN, sleep apnea, back pain, and HLD who presented to the ED today with syncopal episode and shortness of breath. He reports he was working on a bed and then syncopized. He "guesses this happened around 5pm." States he is unsure if he hit his head or what happened and also reported having generalized weakness. States "something is wrong." To ED provider, patient reported left arm numbness. SBP 170s on presentation. No other complaints at this time.   "

## 2023-08-28 NOTE — ED TRIAGE NOTES
"Pt. Is a 67  y.o. male presenting tot he ED via personal transport from home. Per the pt., he was "building a bed" and began to feel generalized weakness and dizziness. Pt. Currently reporting "my whole body feels weak."  "

## 2023-08-28 NOTE — SUBJECTIVE & OBJECTIVE
Past Medical History:   Diagnosis Date    Allergy     Back pain     Hypertension     Mixed hyperlipidemia 7/3/2018    Obesity     Reflux     Sleep apnea 6/21/2013     Past Surgical History:   Procedure Laterality Date    CHOLECYSTECTOMY  2012    COLONOSCOPY N/A 1/3/2023    Procedure: COLONOSCOPY;  Surgeon: Kiera Fonseca MD;  Location: [facility];  Service: Endoscopy;  Laterality: N/A;    ESOPHAGOGASTRODUODENOSCOPY N/A 1/3/2023    Procedure: EGD (ESOPHAGOGASTRODUODENOSCOPY);  Surgeon: Kiera Fonseca MD;  Location: [facility];  Service: Endoscopy;  Laterality: N/A;    HERNIA REPAIR      RHINOPLASTY       Family History   Problem Relation Age of Onset    Cancer Mother 65        exposure related    Heart disease Father     Colon cancer Neg Hx     Esophageal cancer Neg Hx     Diabetes Neg Hx      Social History     Tobacco Use    Smoking status: Never    Smokeless tobacco: Never   Substance Use Topics    Alcohol use: Yes     Alcohol/week: 3.0 standard drinks of alcohol     Types: 3 Cans of beer per week     Comment: 3 times per week    Drug use: Yes     Types: Marijuana     Comment: smoked last yuesterday     Review of patient's allergies indicates:   Allergen Reactions    Lactose intolerance  [lactase]      Other reaction(s): Diarrhea    Penicillins Other (See Comments)     Swelling of lymph nodes       Medications: I have reviewed the current medication administration record.    (Not in a hospital admission)      Review of Systems   HENT:  Negative for trouble swallowing.    Respiratory:  Positive for shortness of breath.    Gastrointestinal:         +belching   Skin:  Negative for rash.   Neurological:  Positive for numbness (L).     Objective:     Vital Signs (Most Recent):  Temp: 97.8 °F (36.6 °C) (08/28/23 1737)  Pulse: 66 (08/28/23 1737)  Resp: 14 (08/28/23 1737)  BP: (!) 171/79 (08/28/23 1737)  SpO2: 100 % (08/28/23 1737)    Vital Signs Range (Last 24H):  Temp:  [97.8 °F (36.6 °C)]   Pulse:  [66]    Resp:  [14]   BP: (171)/(79)   SpO2:  [100 %]        Physical Exam  Vitals and nursing note reviewed.   Constitutional:       General: He is not in acute distress.  HENT:      Head: Normocephalic and atraumatic.      Right Ear: External ear normal.      Left Ear: External ear normal.      Nose: Nose normal.      Mouth/Throat:      Pharynx: Oropharynx is clear.   Eyes:      Extraocular Movements: Extraocular movements intact.      Conjunctiva/sclera: Conjunctivae normal.   Cardiovascular:      Rate and Rhythm: Normal rate.   Pulmonary:      Effort: Pulmonary effort is normal. No respiratory distress.   Abdominal:      General: There is no distension.   Musculoskeletal:         General: Normal range of motion.   Skin:     General: Skin is warm and dry.   Neurological:      Mental Status: He is alert and oriented to person, place, and time.      Cranial Nerves: No dysarthria or facial asymmetry.      Comments: Subjective left sided sensory changes  Effort dependent LLE drift-states he can not move it but patient able to walk from stretcher to [facility] scanner and able to bend L knee spontaneously  Symmetric strength throughout     Psychiatric:         Behavior: Behavior normal.              Neurological Exam:   LOC: alert  Attention Span: Good   Language: No aphasia  Articulation: No dysarthria  Orientation: Person, Place, Time   Visual Fields: Full  EOM (CN III, IV, VI): Full/intact  Facial Movement (CN VII): Symmetric facial expression    Motor: Arm left  Normal 5/5  Leg left  Normal 5/5  Arm right  Normal 5/5  Leg right Normal 5/5  Cerebellum: No evidence of appendicular or axial ataxia  Sensation: Edmond-hypoesthesia left      Laboratory:  CMP:   Recent Labs   Lab 08/28/23  0704   CALCIUM 10.1   ALBUMIN 3.9   PROT 7.3      K 3.4*   CO2 23      BUN 12   CREATININE 1.1   ALKPHOS 55   ALT 14   AST 26   BILITOT 0.6     CBC:   Recent Labs   Lab 08/28/23  0704   WBC 3.86*   RBC 3.57*   HGB 11.7*   HCT 36.3*   PLT  "253   *   MCH 32.8*   MCHC 32.2     Lipid Panel:   Recent Labs   Lab 08/28/23  0704   CHOL 179   LDLCALC 86.4   HDL 79*   TRIG 68     Coagulation: No results for input(s): "PT", "INR", "APTT" in the last 168 hours.  Hgb A1C:   Recent Labs   Lab 08/28/23  0704   HGBA1C 5.1     TSH:   Recent Labs   Lab 08/28/23  0704   TSH 0.683       Diagnostic Results:      Brain imaging:  CTA Stroke MP 8/28/2023  No acute hemorrhage or  LVO seen, pending final radiology read.  "

## 2023-08-28 NOTE — ASSESSMENT & PLAN NOTE
"66 yo male with PMHx of HTN, sleep apnea, back pain, and HLD who presented to the ED today with syncopal episode and SOB. He "guesses this happened around 5pm." States he is unsure if he hit his head or what happened and also reported having generalized weakness. To ED provider, patient reported left arm numbness. SBP 170s on presentation. Stroke coded for left sided numbness per report.    -On evaluation, patient with subjective left sided sensory changes LUE/LLE. Effort dependent LLE drift. Symmetric strength throughout. No facial asymmetry, EOMI, VF intact. No aphasia or dysarthria  -CTA Stroke MP with no LVO or acute hemorrhage seen, official read pending.  -Not a candidate for acute intervention based on NIHSS and strong suspicion for stroke mimic. Patient's symptoms not suggestive of an LVO.  -Recommend workup for alternate causes of patient's symptoms including cardiac, infectious and metabolic workup.  -Case discussed with staff.  -Recommend obtaining MRI Brain WO to rule out acute infarct. If negative for acute stroke, VN will sign off. Please do not hesitate to contact us for any additional questions or concerns.   "

## 2023-08-28 NOTE — CONSULTS
"[facility] - Emergency Dept  Vascular Neurology  [facility]  Consult Note    Inpatient consult to Vascular (Stroke) Neurology  Consult performed by: Stephanie Holbrook PA-C  Consult ordered by: Manohar Valdes MD        Assessment/Plan:     Patient is a 67 y.o. year old male with:    * Syncope  68 yo male with PMHx of HTN, sleep apnea, back pain, and HLD who presented to the ED today with syncopal episode and SOB. He "guesses this happened around 5pm." States he is unsure if he hit his head or what happened and also reported having generalized weakness. To ED provider, patient reported left arm numbness. SBP 170s on presentation. Stroke coded for left sided numbness per report.    -On evaluation, patient with subjective left sided sensory changes LUE/LLE. Effort dependent LLE drift. Symmetric strength throughout. No facial asymmetry, EOMI, VF intact. No aphasia or dysarthria  -CTA Stroke MP with no LVO or acute hemorrhage seen, official read pending.  -Not a candidate for acute intervention based on NIHSS and strong suspicion for stroke mimic. Patient's symptoms not suggestive of an LVO.  -Recommend workup for alternate causes of patient's symptoms including cardiac, infectious and metabolic workup.  -Case discussed with staff.  -Recommend obtaining MRI Brain WO to rule out acute infarct. If negative for acute stroke, VN will sign off. Please do not hesitate to contact us for any additional questions or concerns.         STROKE DOCUMENTATION     Acute Stroke Times   Last Known Normal Date: 08/28/23  Last Known Normal Time: 1700  Symptom Onset Date: 08/28/23  Symptom Onset Time: 1700 (Unclear, patient "guesses it started around 5pm")  Unknown Symptom Onset Time: Unknown Time  Stroke Team Called Date: 08/28/23  Stroke Team Called Time: 1740  Stroke Team Arrival Date: 08/28/23  Stroke Team Arrival Time: 1744  CT Interpretation Time: 1752  Thrombolytic Therapy Recommended: No  CTA Interpretation Time: " 1754  Thrombectomy Recommended: No    NIH Scale:  1a. Level of Consciousness: 0-->Alert, keenly responsive  1b. LOC Questions: 0-->Answers both questions correctly  1c. LOC Commands: 0-->Performs both tasks correctly  2. Best Gaze: 0-->Normal  3. Visual: 0-->No visual loss  4. Facial Palsy: 0-->Normal symmetrical movements  5a. Motor Arm, Left: 0-->No drift, limb holds 90 (or 45) degrees for full 10 secs  5b. Motor Arm, Right: 0-->No drift, limb holds 90 (or 45) degrees for full 10 secs  6a. Motor Leg, Left: 2-->Some effort against gravity, leg falls to bed by 5 secs, but has some effort against gravity (effort dependent)  6b. Motor Leg, Right: 0-->No drift, leg holds 30 degree position for full 5 secs  7. Limb Ataxia: 0-->Absent  8. Sensory: 1-->Mild-to-moderate sensory loss, patient feels pinprick is less sharp or is dull on the affected side, or there is a loss of superficial pain with pinprick, but patient is aware of being touched  9. Best Language: 0-->No aphasia, normal  10. Dysarthria: 0-->Normal  11. Extinction and Inattention (formerly Neglect): 0-->No abnormality  Total (NIH Stroke Scale): 3    Modified [place] Score: 0  [place] Coma Scale:    ABCD2 Score:    [id number] Score:   HAS -BLED Score:   ICH Score:   Hunt & Travis Classification:       Thrombolysis Candidate? No, Non-disabling symptoms - Low NIHSS , Strong suspicion for stroke mimic or alternative diagnosis     Delays to Thrombolysis?  No    Interventional Revascularization Candidate?   Is the patient eligible for mechanical endovascular reperfusion (VIJI)?  No; at this time symptoms not suggestive of large vessel occlusion    Delays to Thrombectomy? No    Hemorrhagic change of an Ischemic Stroke: Does this patient have an ischemic stroke with hemorrhagic changes? No     Subjective:     History of Present Illness:  66 yo male with PMHx of HTN, sleep apnea, back pain, and HLD who presented to the ED today with syncopal episode and shortness of  "breath. He reports he was working on a bed and then syncopized. He "guesses this happened around 5pm." States he is unsure if he hit his head or what happened and also reported having generalized weakness. States "something is wrong." To ED provider, patient reported left arm numbness. SBP 170s on presentation. No other complaints at this time.           Past Medical History:   Diagnosis Date    Allergy     Back pain     Hypertension     Mixed hyperlipidemia 7/3/2018    Obesity     Reflux     Sleep apnea 6/21/2013     Past Surgical History:   Procedure Laterality Date    CHOLECYSTECTOMY  2012    COLONOSCOPY N/A 1/3/2023    Procedure: COLONOSCOPY;  Surgeon: Kiera Fonseca MD;  Location: [facility];  Service: Endoscopy;  Laterality: N/A;    ESOPHAGOGASTRODUODENOSCOPY N/A 1/3/2023    Procedure: EGD (ESOPHAGOGASTRODUODENOSCOPY);  Surgeon: Kiera Fonseca MD;  Location: [facility];  Service: Endoscopy;  Laterality: N/A;    HERNIA REPAIR      RHINOPLASTY       Family History   Problem Relation Age of Onset    Cancer Mother 65        exposure related    Heart disease Father     Colon cancer Neg Hx     Esophageal cancer Neg Hx     Diabetes Neg Hx      Social History     Tobacco Use    Smoking status: Never    Smokeless tobacco: Never   Substance Use Topics    Alcohol use: Yes     Alcohol/week: 3.0 standard drinks of alcohol     Types: 3 Cans of beer per week     Comment: 3 times per week    Drug use: Yes     Types: Marijuana     Comment: smoked last yuesterday     Review of patient's allergies indicates:   Allergen Reactions    Lactose intolerance  [lactase]      Other reaction(s): Diarrhea    Penicillins Other (See Comments)     Swelling of lymph nodes       Medications: I have reviewed the current medication administration record.    (Not in a hospital admission)      Review of Systems   HENT:  Negative for trouble swallowing.    Respiratory:  Positive for shortness of breath.  "   Gastrointestinal:         +belching   Skin:  Negative for rash.   Neurological:  Positive for numbness (L).     Objective:     Vital Signs (Most Recent):  Temp: 97.8 °F (36.6 °C) (08/28/23 1737)  Pulse: 66 (08/28/23 1737)  Resp: 14 (08/28/23 1737)  BP: (!) 171/79 (08/28/23 1737)  SpO2: 100 % (08/28/23 1737)    Vital Signs Range (Last 24H):  Temp:  [97.8 °F (36.6 °C)]   Pulse:  [66]   Resp:  [14]   BP: (171)/(79)   SpO2:  [100 %]        Physical Exam  Vitals and nursing note reviewed.   Constitutional:       General: He is not in acute distress.  HENT:      Head: Normocephalic and atraumatic.      Right Ear: External ear normal.      Left Ear: External ear normal.      Nose: Nose normal.      Mouth/Throat:      Pharynx: Oropharynx is clear.   Eyes:      Extraocular Movements: Extraocular movements intact.      Conjunctiva/sclera: Conjunctivae normal.   Cardiovascular:      Rate and Rhythm: Normal rate.   Pulmonary:      Effort: Pulmonary effort is normal. No respiratory distress.   Abdominal:      General: There is no distension.   Musculoskeletal:         General: Normal range of motion.   Skin:     General: Skin is warm and dry.   Neurological:      Mental Status: He is alert and oriented to person, place, and time.      Cranial Nerves: No dysarthria or facial asymmetry.      Comments: Subjective left sided sensory changes  Effort dependent LLE drift-states he can not move it but patient able to walk from stretcher to [facility] scanner and able to bend L knee spontaneously  Symmetric strength throughout     Psychiatric:         Behavior: Behavior normal.              Neurological Exam:   LOC: alert  Attention Span: Good   Language: No aphasia  Articulation: No dysarthria  Orientation: Person, Place, Time   Visual Fields: Full  EOM (CN III, IV, VI): Full/intact  Facial Movement (CN VII): Symmetric facial expression    Motor: Arm left  Normal 5/5  Leg left  Normal 5/5  Arm right  Normal 5/5  Leg right Normal  "5/5  Cerebellum: No evidence of appendicular or axial ataxia  Sensation: Edmond-hypoesthesia left      Laboratory:  CMP:   Recent Labs   Lab 08/28/23  0704   CALCIUM 10.1   ALBUMIN 3.9   PROT 7.3      K 3.4*   CO2 23      BUN 12   CREATININE 1.1   ALKPHOS 55   ALT 14   AST 26   BILITOT 0.6     CBC:   Recent Labs   Lab 08/28/23  0704   WBC 3.86*   RBC 3.57*   HGB 11.7*   HCT 36.3*      *   MCH 32.8*   MCHC 32.2     Lipid Panel:   Recent Labs   Lab 08/28/23  0704   CHOL 179   LDLCALC 86.4   HDL 79*   TRIG 68     Coagulation: No results for input(s): "PT", "INR", "APTT" in the last 168 hours.  Hgb A1C:   Recent Labs   Lab 08/28/23  0704   HGBA1C 5.1     TSH:   Recent Labs   Lab 08/28/23  0704   TSH 0.683       Diagnostic Results:      Brain imaging:  CTA Stroke MP 8/28/2023  No acute hemorrhage or  LVO seen, pending final radiology read.      Stephanie Holbrook PA-C  [facility]  Department of Vascular Neurology   [facility] - Emergency Dept   "

## 2023-08-28 NOTE — Clinical Note
"Date: 8/28/2023  Patient: Isreal Randolph  Admitted: 8/28/2023  5:40 PM  Attending Provider: Jeana Biggs MD    Isreal Randolph or his authorized caregiver has made the decision for the patient to leave the emergency department against the advice of his a ttending physician. He or his authorized caregiver has been informed and understands the inherent risks, including death, or permanent disability.  He or his authorized caregiver has decided to accept the responsibility for this decision. Isreal rubalcava and all necessary parties have been advised that he may return for further evaluation or treatment. His condition at time of discharge was stable.  Isreal Randolph had current vital signs as follows:  BP (!) 156/82   Pulse (!) 58   Temp 97.8 °F (3 6.6 °C)   Resp 12   Ht 5' 6" (1.676 m)   Wt 65.8 kg (145 lb)   "

## 2023-08-29 ENCOUNTER — HOSPITAL ENCOUNTER (EMERGENCY)
Facility: HOSPITAL | Age: 68
Discharge: HOME | End: 2023-08-29
Attending: EMERGENCY MEDICINE
Payer: MEDICARE

## 2023-08-29 VITALS
HEART RATE: 66 BPM | TEMPERATURE: 99 F | RESPIRATION RATE: 18 BRPM | OXYGEN SATURATION: 98 % | SYSTOLIC BLOOD PRESSURE: 173 MMHG | DIASTOLIC BLOOD PRESSURE: 85 MMHG

## 2023-08-29 DIAGNOSIS — R20.2 PARESTHESIAS: Primary | ICD-10-CM

## 2023-08-29 DIAGNOSIS — R55 SYNCOPE: ICD-10-CM

## 2023-08-29 LAB
ALBUMIN SERPL BCP-MCNC: 4 G/DL (ref 3.5–5.2)
ALP SERPL-CCNC: 61 U/L (ref 55–135)
ALT SERPL W/O P-5'-P-CCNC: 15 U/L (ref 10–44)
ANION GAP SERPL CALC-SCNC: 10 MMOL/L (ref 8–16)
AST SERPL-CCNC: 25 U/L (ref 10–40)
BASOPHILS # BLD AUTO: 0.03 K/UL (ref 0–0.2)
BASOPHILS NFR BLD: 0.8 % (ref 0–1.9)
BILIRUB SERPL-MCNC: 0.4 MG/DL (ref 0.1–1)
BUN SERPL-MCNC: 13 MG/DL (ref 8–23)
CALCIUM SERPL-MCNC: 10.3 MG/DL (ref 8.7–10.5)
CHLORIDE SERPL-SCNC: 104 MMOL/L (ref 95–110)
CK SERPL-CCNC: 224 U/L (ref 20–200)
CO2 SERPL-SCNC: 26 MMOL/L (ref 23–29)
CREAT SERPL-MCNC: 1.5 MG/DL (ref 0.5–1.4)
DIFFERENTIAL METHOD: ABNORMAL
EOSINOPHIL # BLD AUTO: 0.1 K/UL (ref 0–0.5)
EOSINOPHIL NFR BLD: 1.7 % (ref 0–8)
ERYTHROCYTE [DISTWIDTH] IN BLOOD BY AUTOMATED COUNT: 13.6 % (ref 11.5–14.5)
EST. GFR  (NO RACE VARIABLE): 50.1 ML/MIN/1.73 M^2
GLUCOSE SERPL-MCNC: 105 MG/DL (ref 70–110)
HCT VFR BLD AUTO: 34.2 % (ref 40–54)
HGB BLD-MCNC: 11.4 G/DL (ref 14–18)
IMM GRANULOCYTES # BLD AUTO: 0.01 K/UL (ref 0–0.04)
IMM GRANULOCYTES NFR BLD AUTO: 0.3 % (ref 0–0.5)
LYMPHOCYTES # BLD AUTO: 0.8 K/UL (ref 1–4.8)
LYMPHOCYTES NFR BLD: 22.5 % (ref 18–48)
MCH RBC QN AUTO: 32.5 PG (ref 27–31)
MCHC RBC AUTO-ENTMCNC: 33.3 G/DL (ref 32–36)
MCV RBC AUTO: 97 FL (ref 82–98)
MONOCYTES # BLD AUTO: 0.4 K/UL (ref 0.3–1)
MONOCYTES NFR BLD: 9.8 % (ref 4–15)
NEUTROPHILS # BLD AUTO: 2.3 K/UL (ref 1.8–7.7)
NEUTROPHILS NFR BLD: 64.9 % (ref 38–73)
NRBC BLD-RTO: 0 /100 WBC
PLATELET # BLD AUTO: 248 K/UL (ref 150–450)
PMV BLD AUTO: 9.6 FL (ref 9.2–12.9)
POTASSIUM SERPL-SCNC: 2.8 MMOL/L (ref 3.5–5.1)
PROT SERPL-MCNC: 7.4 G/DL (ref 6–8.4)
RBC # BLD AUTO: 3.51 M/UL (ref 4.6–6.2)
SODIUM SERPL-SCNC: 140 MMOL/L (ref 136–145)
TROPONIN I SERPL DL<=0.01 NG/ML-MCNC: 0.01 NG/ML (ref 0–0.03)
WBC # BLD AUTO: 3.56 K/UL (ref 3.9–12.7)

## 2023-08-29 PROCEDURE — 25000003 PHARM REV CODE 250: Performed by: EMERGENCY MEDICINE

## 2023-08-29 PROCEDURE — 93010 EKG 12-LEAD: ICD-10-PCS | Mod: ,,, | Performed by: INTERNAL MEDICINE

## 2023-08-29 PROCEDURE — 82550 ASSAY OF CK (CPK): CPT

## 2023-08-29 PROCEDURE — 93010 ELECTROCARDIOGRAM REPORT: CPT | Mod: ,,, | Performed by: INTERNAL MEDICINE

## 2023-08-29 PROCEDURE — 63600175 PHARM REV CODE 636 W HCPCS

## 2023-08-29 PROCEDURE — 96360 HYDRATION IV INFUSION INIT: CPT

## 2023-08-29 PROCEDURE — 80053 COMPREHEN METABOLIC PANEL: CPT

## 2023-08-29 PROCEDURE — 93005 ELECTROCARDIOGRAM TRACING: CPT

## 2023-08-29 PROCEDURE — 99285 EMERGENCY DEPT VISIT HI MDM: CPT | Mod: 25

## 2023-08-29 PROCEDURE — 85025 COMPLETE CBC W/AUTO DIFF WBC: CPT

## 2023-08-29 PROCEDURE — 25000003 PHARM REV CODE 250

## 2023-08-29 PROCEDURE — 84484 ASSAY OF TROPONIN QUANT: CPT

## 2023-08-29 RX ORDER — MAG HYDROX/ALUMINUM HYD/SIMETH 200-200-20
30 SUSPENSION, ORAL (FINAL DOSE FORM) ORAL
Status: COMPLETED | OUTPATIENT
Start: 2023-08-29 | End: 2023-08-29

## 2023-08-29 RX ORDER — POTASSIUM CHLORIDE 20 MEQ/1
40 TABLET, EXTENDED RELEASE ORAL ONCE
Status: COMPLETED | OUTPATIENT
Start: 2023-08-29 | End: 2023-08-29

## 2023-08-29 RX ADMIN — ALUMINUM HYDROXIDE, MAGNESIUM HYDROXIDE, AND SIMETHICONE 30 ML: 200; 200; 20 SUSPENSION ORAL at 08:08

## 2023-08-29 RX ADMIN — POTASSIUM CHLORIDE 40 MEQ: 1500 TABLET, EXTENDED RELEASE ORAL at 08:08

## 2023-08-29 RX ADMIN — SODIUM CHLORIDE, POTASSIUM CHLORIDE, SODIUM LACTATE AND CALCIUM CHLORIDE 1000 ML: 600; 310; 30; 20 INJECTION, SOLUTION INTRAVENOUS at 08:08

## 2023-08-29 NOTE — ED PROVIDER NOTES
"Encounter Date: 8/29/2023     Source of History:   Patient, and medical record, without language barrier or      Chief complaint:  Weakness (Recently seen yesterday for potential stroke with L side weakness and numbness, pt left AMA to take a care of dog. Back this morning to "follow up on his stroke". Pt states symptoms still persist. Pt ambulatory, Aox4, Van negative. )    HPI:  Isreal Randolph is a 67 y.o. male with history of HTN, HLD, GERD, KHARI presenting with chief complaint of sensory change.  Patient is returned to the ED after leaving AMA yesterday to take care of a pet.  He initially presented after a syncopal episode and stroke-like symptoms consisting of left-sided numbness and tingling with subjective left-sided weakness.  Preceding his syncopal episode he states he felt warm and then had sensation of tunnel vision before blacking out.  He was unsure how long he was down and woke up inside of his house with shortness of breath, left-sided numbness, and left lower extremity weakness.  Patient then called EMS but states that the left lower extremity weakness began to resolve rapidly and had improved significantly by the time he arrived at the ED. patient's CT scan was negative for large vessel occlusion but he left AMA before completion of his MRI    This is the extent to the patients complaints today here in the emergency department.    ROS: As per HPI and below:  ROS    Review of patient's allergies indicates:   Allergen Reactions    Lactose intolerance  [lactase]      Other reaction(s): Diarrhea    Penicillins Other (See Comments)     Swelling of lymph nodes     PMH:  As per HPI and below:  Past Medical History:   Diagnosis Date    Allergy     Back pain     Hypertension     Mixed hyperlipidemia 7/3/2018    Obesity     Reflux     Sleep apnea 6/21/2013     Past Surgical History:   Procedure Laterality Date    CHOLECYSTECTOMY  2012    COLONOSCOPY N/A 1/3/2023    Procedure: COLONOSCOPY;  Surgeon: " Kiera Fonseca MD;  Location: [facility];  Service: Endoscopy;  Laterality: N/A;    ESOPHAGOGASTRODUODENOSCOPY N/A 1/3/2023    Procedure: EGD (ESOPHAGOGASTRODUODENOSCOPY);  Surgeon: Kiera Fonseca MD;  Location: [facility];  Service: Endoscopy;  Laterality: N/A;    HERNIA REPAIR      RHINOPLASTY       Social History     Tobacco Use    Smoking status: Never    Smokeless tobacco: Never   Substance Use Topics    Alcohol use: Yes     Alcohol/week: 3.0 standard drinks of alcohol     Types: 3 Cans of beer per week     Comment: 3 times per week    Drug use: Yes     Types: Marijuana     Comment: smoked last yuesterday     Vitals:    BP (!) 173/85 (BP Location: Left arm, Patient Position: Lying)   Pulse 66   Temp 98.5 °F (36.9 °C) (Oral)   Resp 18   SpO2 98%     Physical Exam  Vitals and nursing note reviewed.   Constitutional:       General: He is not in acute distress.     Appearance: He is not toxic-appearing.   HENT:      Head: Normocephalic and atraumatic.      Mouth/Throat:      Mouth: Mucous membranes are moist.   Eyes:      General: No scleral icterus.  Cardiovascular:      Rate and Rhythm: Normal rate and regular rhythm.      Pulses: Normal pulses.      Heart sounds: Normal heart sounds. No murmur heard.     No friction rub. No gallop.   Pulmonary:      Effort: Pulmonary effort is normal. No respiratory distress.      Breath sounds: Normal breath sounds. No stridor. No wheezing, rhonchi or rales.   Abdominal:      General: Abdomen is flat. There is no distension.      Palpations: Abdomen is soft.      Tenderness: There is no abdominal tenderness. There is no guarding.   Musculoskeletal:         General: No swelling or deformity.      Cervical back: Normal range of motion and neck supple.   Skin:     General: Skin is warm and dry.      Capillary Refill: Capillary refill takes less than 2 seconds.      Coloration: Skin is not jaundiced.   Neurological:      Mental Status: He is alert.      Comments:    -Moves all extremities and carries on conversation  -II: PERRL; III/IV/VI: EOMI w/out evidence of nystagmus or pain;  -V: no deficits appreciated to light touch bilateral face;   -VII: no facial weakness, no facial asymmetry. Eyebrow raise symmetric. Smile symmetric;   -IX/X: palate midline, and raises symmetrically; XI: shoulder shrug 5/5 bilaterally; XII: tongue is midline w/out asymmetry.   -Strength 5/5 to right upper and lower extremities,  -Strength 5/5 to left upper and 4/5 to lower extremities,  -Sensation intact to light touch to bilateral upper and lower extremities  -Normal pronator drift        Procedures    Laboratory Studies:  Labs that have been ordered have been independently reviewed and interpreted by myself.  Labs Reviewed   CBC W/ AUTO DIFFERENTIAL - Abnormal; Notable for the following components:       Result Value    WBC 3.56 (*)     RBC 3.51 (*)     Hemoglobin 11.4 (*)     Hematocrit 34.2 (*)     MCH 32.5 (*)     Lymph # 0.8 (*)     All other components within normal limits   COMPREHENSIVE METABOLIC PANEL - Abnormal; Notable for the following components:    Potassium 2.8 (*)     Creatinine 1.5 (*)     eGFR 50.1 (*)     All other components within normal limits   CK - Abnormal; Notable for the following components:     (*)     All other components within normal limits   TROPONIN I     Imaging Results              MRI Brain Without Contrast (Final result)  Result time 08/29/23 10:16:24      Final result by Tommy Torres MD (08/29/23 10:16:24)                   Impression:      No acute intracranial process with specifically no evidence of acute infarct.    Chiari I malformation.      Electronically signed by: Tommy Torres  Date:    08/29/2023  Time:    10:16               Narrative:    EXAMINATION:  MRI BRAIN WITHOUT CONTRAST    CLINICAL HISTORY:  Stroke, follow up;    TECHNIQUE:  Multiplanar multisequence MR imaging of the brain was performed without  contrast.    COMPARISON:  04/05/2011, CT 08/28/2023    FINDINGS:  There is no evidence of hydrocephalus mass effect intracranial hemorrhage or acute infarct on diffusion-weighted imaging.    Increased signal within the periventricular and subcortical white matter is nonspecific but may reflect mild to moderate chronic small vessel ischemic change, stable to mildly progressed from the prior study.    Developmentally the cerebellar tonsils protrude 7 mm below the foramen magnum suggesting a Chiari I malformation, similar to the prior study.    Normal arterial flow voids are preserved at the skull base.    Mild right maxillary mucosal thickening.  The mastoid air cells are clear                                    EKG (independently interpreted by me): Rhythm: nsr, rate of  57 BPM, no ST elevations or depressions, QTc 404    Imaging (independently interpreted by me):  Brain MRI: No CVA    I decided to obtain the patient's medical records.  Summary of Medical Records:      Medications   lactated ringers bolus 1,000 mL (0 mLs Intravenous Stopped 8/29/23 0930)   aluminum-magnesium hydroxide-simethicone 200-200-20 mg/5 mL suspension 30 mL (30 mLs Oral Given 8/29/23 0855)   potassium chloride SA CR tablet 40 mEq (40 mEq Oral Given 8/29/23 0855)     MDM:    67 y.o. male with left-sided sensory changes    Differential Dx:  Differential includes but is not limited to CVA, TIA, metabolic/toxic    ED Management:  Stroke workup started for an acute presentation of an emergent condition with multiple comorbidities.  LR for dehydration, Maalox for belching.  Will complete patient's stroke workup with MRI without contrast.  Labs grossly unchanged from yesterday CK still mildly elevated likely secondary to dehydration, patient currently getting IV fluid bolus.  Troponin negative.  MRI showing no acute CVA.  Patient is already on aspirin for risk factor modification of TIA/CVA.  Will start patient on baby aspirin and send to vascular  neurology clinic for further workup and risk factor modification in the light of his recent neurological disturbances.  Patient given vascular neurology referral.  Discussed results including any incidental findings, diagnosis, and treatment plan with patient; advised close follow-up with PCP. Reviewed strict return precautions. Patient confirms understanding and ability to comply. Patient was given the opportunity to ask questions prior to discharge and all questions answered.         Medical Decision Making  Amount and/or Complexity of Data Reviewed  Labs: ordered. Decision-making details documented in ED Course.  Radiology: ordered.  ECG/medicine tests: ordered and independent interpretation performed.    Risk  OTC drugs.  Prescription drug management.         ED Course as of 08/29/23 1519   Tue Aug 29, 2023   0950 CPK(!): 224 [AW]   0950 Hemoglobin(!): 11.4  Baseline [AW]   0950 WBC(!): 3.56 [AW]   0950 Potassium(!): 2.8  Repleted [AW]   1025 Impression:     No acute intracranial process with specifically no evidence of acute infarct.     Chiari I malformation.        Electronically signed by: Tommy Torres  Date:                                            08/29/2023  Time:                                           10:16 [GM]   1101 CPK(!): 224 [AW]   1103 CPK(!): 224  fluids [AW]      ED Course User Index  [AW] Henrry Healy MD  [GM] Aleshia Gruber MD     Diagnostic Impression:    Final diagnoses:  [R55] Syncope  [R20.2] Paresthesias (Primary)     ED Disposition Condition    Discharge Stable          ED Prescriptions    None       Follow-up Information       Follow up With Specialties Details Why Contact Info    Jamaal Tellez MD Family Medicine   [address] [place] [address]  [place] [address]  [phone]      [facility] - Emergency Dept Emergency Medicine Go to  As needed, If symptoms worsen [facility] [address] [phone]              Attending Attestation:              Attending ED Notes:   Attending Note:  I have seen the patient, have repeated the key portions of the history and physical, reviewed and agree with the medical documentation, and supervised and managed the medical care of the patient. Additionally, I was present for the critical portion of any procedure(s) performed.    67-year-old male presenting today for re-evaluation after leaving AMA yesterday prior to completion of stroke workup.  Briefly, patient had a syncopal episode yesterday followed by left sided paresthesias.  CTA multiphase negative for LVAD, possible remote lacunar infarct.  Plan for repeat labs, MRI.    Labs reviewed with mild anemia, hypokalemia 2.8 which was replaced.  MRI negative for acute ischemic issue.  No indication for further admission at this time.  Recommend follow-up with Neurology as an outpatient.    SIMI Gruber MD  Staff ED Physician               Henrry Healy MD  Resident  08/29/23 [id number]       Aleshia Gruber MD  08/30/23 [id number]

## 2023-08-29 NOTE — PROVIDER PROGRESS NOTES - EMERGENCY DEPT.
Encounter Date: 8/28/2023    ED Physician Progress Notes          Physician Attestation Statement for Resident:  As the supervising MD   Physician Attestation Statement: I have personally seen and examined this patient.       ***I agree with the history unless otherwise noted.     ***As the supervising MD I agree with the PE unless otherwise noted.      ***I have reviewed and agree with the residents interpretation of the following unless otherwise noted:   ***I have personally reviewed and interpreted the patients laboratory studies:  Creatinine 1.6, hemoglobin 11, INR 1.0, troponin 0.01, TSH within normal limits  ***I have personally reviewed and interpreted imaging studies: CXR: I have personally reviewed the CXR. No evidence of consolidation, cardiomegaly, pulmonary edema, pneumothroax  ***I have personally reviewed and interpreted the patient's EKG:  Sinus bradycardia at 57 beats per minute, no ischemic ST/T-wave changes      ***I have also reviewed the following:   External records:  EKG 01/09/2023 with similar bradycardia    ***As the supervising MD I agree with the treatment, course, plan, and disposition unless otherwise noted.      Critical Care   Date: 08/28/2023  Performed by: Jeana Biggs MD   Authorized by: Jeana Biggs MD      Total critical care time (exclusive of procedural time) : 35 minutes, exclusive of separately billable procedures and treating other patients and teaching time.    Critical care was necessary to treat or prevent imminent or life-threatening deterioration of the following conditions:  CVA    Due to a high probability of clinically significant, life threatening deterioration, the patient required my highest level of preparedness to intervene emergently and I personally spent this critical care time directly and personally managing the patient. This critical care time included obtaining a history; examining the patient; pulse oximetry; ordering and review of studies; arranging urgent  treatment with development of a management plan; evaluation of patient's response to treatment; frequent reassessment, documentation, and, discussions with other providers, ***patient and ***family members.

## 2023-08-29 NOTE — ED NOTES
Took report from Zulema RN and Margaret RN, and assumed care of pt at this time. Pt resting comfortably and independently repositioned in stretcher with bed locked in lowest position for safety. NAD noted at this time. Respirations even and unlabored and visible chest rise noted.  Patient offered bathroom assistance and denies need at this time. Pt instructed to call if assistance is needed. Pt on continuous cardiac, BP, and O2 monitoring. Call light within reach. No needs at this time. Will continue to monitor.

## 2023-08-29 NOTE — DISCHARGE INSTRUCTIONS
It was a pleasure taking care of you today!     Diagnosis: Numbness and Tingling  -continue taking medications as prescribed  -start taking an 81 mg baby aspirin daily  -your MRI today showed that you did not have a stroke  -follow-up with vascular neurology, you have been given a referral they will call you to schedule an appointment    Follow-Up Plan:  - Follow-up with primary care doctor within 3 - 5 days to discuss your recent ER visit and any additional concerns that you may have.  - Additional testing and/or evaluation as directed by your primary doctor    Return to the Emergency Department for symptoms including but not limited to: persistence or worsening of symptoms, shortness of breath or chest pain, inability to drink without vomiting, passing out/fainting/ loss of consciousness, or if you have other concerns.

## 2023-08-29 NOTE — ED PROVIDER NOTES
Encounter Date: 8/28/2023       History     Chief Complaint   Patient presents with    Loss of Consciousness     Pt states he passed out around 5pm and when he woke up he was having dizziness and left sided numbness.      67-year-old male with a past medical history of hypertension, hyperlipidemia, reflux and sleep apnea presents with a chief complaint of syncope.  The patient says that at approximately 5:00 p.m., he was assembling a bed at home indoors, was sitting down and says that he felt faint.  He says that he woke up and felt numb in his left arm and left leg.  He says that this has never happened to him before.  He also says that he has had shortness of breath since this event, and on further questioning, the patient says that he has been having a tingling sensation in his left leg for the past several days.  He says that he has not had any associated weakness, headache or vision changes.  He also denies any recent trauma, febrile illnesses, says that he has been eating and drinking normally, denies any nausea or vomiting or diarrhea, dysuria or new rashes.    The history is provided by the patient. No  was used.     Review of patient's allergies indicates:   Allergen Reactions    Lactose intolerance  [lactase]      Other reaction(s): Diarrhea    Penicillins Other (See Comments)     Swelling of lymph nodes     Past Medical History:   Diagnosis Date    Allergy     Back pain     Hypertension     Mixed hyperlipidemia 7/3/2018    Obesity     Reflux     Sleep apnea 6/21/2013     Past Surgical History:   Procedure Laterality Date    CHOLECYSTECTOMY  2012    COLONOSCOPY N/A 1/3/2023    Procedure: COLONOSCOPY;  Surgeon: Kiera Fonseca MD;  Location: [facility];  Service: Endoscopy;  Laterality: N/A;    ESOPHAGOGASTRODUODENOSCOPY N/A 1/3/2023    Procedure: EGD (ESOPHAGOGASTRODUODENOSCOPY);  Surgeon: Kiera Fonseca MD;  Location: [facility];  Service: Endoscopy;  Laterality: N/A;    HERNIA  REPAIR      RHINOPLASTY       Family History   Problem Relation Age of Onset    Cancer Mother 65        exposure related    Heart disease Father     Colon cancer Neg Hx     Esophageal cancer Neg Hx     Diabetes Neg Hx      Social History     Tobacco Use    Smoking status: Never    Smokeless tobacco: Never   Substance Use Topics    Alcohol use: Yes     Alcohol/week: 3.0 standard drinks of alcohol     Types: 3 Cans of beer per week     Comment: 3 times per week    Drug use: Yes     Types: Marijuana     Comment: smoked last yuesterday     Review of Systems    Physical Exam     Initial Vitals [08/28/23 1737]   BP Pulse Resp Temp SpO2   (!) 171/79 66 14 97.8 °F (36.6 °C) 100 %      MAP       --         Physical Exam    Nursing note and vitals reviewed.  Constitutional: He appears well-developed and well-nourished. He is not diaphoretic. No distress.   HENT:   Head: Normocephalic and atraumatic.   Eyes: EOM are normal. Pupils are equal, round, and reactive to light.   Neck: Neck supple.   Normal range of motion.  Cardiovascular:  Normal rate, regular rhythm, normal heart sounds and intact distal pulses.           Pulmonary/Chest: Breath sounds normal. He has no wheezes. He has no rhonchi. He has no rales.   Abdominal: Abdomen is soft. There is no abdominal tenderness. There is no rebound and no guarding.   Musculoskeletal:         General: No tenderness or edema. Normal range of motion.      Cervical back: Normal range of motion and neck supple.     Neurological: He is alert and oriented to person, place, and time. He has normal strength. No cranial nerve deficit or sensory deficit.   Cranial nerves 2-12 grossly intact, sensation and strength intact and equal in the upper and lower extremities bilaterally   Skin: Skin is warm and dry. Capillary refill takes less than 2 seconds. No rash noted. No erythema. No pallor.   Psychiatric: He has a normal mood and affect. His behavior is normal. Judgment and thought content  normal.         ED Course   Procedures  Labs Reviewed   CBC W/ AUTO DIFFERENTIAL - Abnormal; Notable for the following components:       Result Value    RBC 3.40 (*)     Hemoglobin 11.0 (*)     Hematocrit 33.3 (*)     MCH 32.4 (*)     All other components within normal limits    Narrative:     Release to patient->Immediate   COMPREHENSIVE METABOLIC PANEL - Abnormal; Notable for the following components:    Potassium 3.2 (*)     Creatinine 1.6 (*)     eGFR 46.4 (*)     All other components within normal limits    Narrative:     Release to patient->Immediate  Add on Trop, CPK and MG per Dr. Biggs @ 18:16 pm to order # [id number]   CK - Abnormal; Notable for the following components:     (*)     All other components within normal limits    Narrative:     Release to patient->Immediate  Add on Trop, CPK and MG per Dr. Biggs @ 18:16 pm to order # [id number]   URINALYSIS, REFLEX TO URINE CULTURE - Abnormal; Notable for the following components:    Color, UA Colorless (*)     All other components within normal limits    Narrative:     Specimen Source->Urine   ISTAT CREATININE - Abnormal; Notable for the following components:    POC Creatinine 1.7 (*)     All other components within normal limits   ISTAT PROCEDURE - Abnormal; Notable for the following components:    POC Glucose 114 (*)     POC Creatinine 1.7 (*)     POC Potassium 3.1 (*)     POC Hematocrit 33 (*)     All other components within normal limits   HIV 1 / 2 ANTIBODY    Narrative:     Release to patient->Immediate  Add on Trop, CPK and MG per Dr. Biggs @ 18:16 pm to order # [id number]   HEPATITIS C ANTIBODY    Narrative:     Release to patient->Immediate  Add on Trop, CPK and MG per Dr. Biggs @ 18:16 pm to order # [id number]   PROTIME-INR    Narrative:     Release to patient->Immediate   TSH    Narrative:     Release to patient->Immediate  Add on Trop, CPK and MG per Dr. Biggs @ 18:16 pm to order # [id number]   LIPID PANEL    Narrative:     Release to  patient->Immediate  Add on Trop, CPK and MG per Dr. Biggs @ 18:16 pm to order # [id number]   B-TYPE NATRIURETIC PEPTIDE   CK   MAGNESIUM   TROPONIN I   TROPONIN I    Narrative:     Release to patient->Immediate  Add on Trop, CPK and MG per Dr. Biggs @ 18:16 pm to order # [id number]   MAGNESIUM    Narrative:     Release to patient->Immediate  Add on Trop, CPK and MG per Dr. Biggs @ 18:16 pm to order # [id number]   B-TYPE NATRIURETIC PEPTIDE    Narrative:     Add-on BNP to [id number] per Amilcar Poole Rn on  08/28/2023  19:17     Release to patient->Immediate  Add on Trop, CPK and MG per Dr. Biggs @ 18:16 pm to order # [id number]   ISTAT PROCEDURE          Imaging Results               CTA Chest Abdomen Pelvis (Final result)  Result time 08/28/23 22:34:06      Final result by Osmin Tariq MD (08/28/23 22:34:06)                   Impression:      No evidence of aortic dissection or aneurysm.  Ectasia of the common iliac arteries bilaterally, similar compared to prior.    Stable right renal indeterminate lesion measuring 1.4 cm.  Recommend nonemergent retroperitoneal ultrasound for further evaluation can be done on outpatient basis.    Anasarca.    Enlarged prostate.  Suggest correlation with PSA.    Metal shrapnel fragments in the subcutaneous soft tissues of the right buttocks, similar to prior study.    Additional findings above.    This report was flagged in Epic as abnormal.    Electronically signed by resident: Mik Raymond  Date:    08/28/2023  Time:    21:07    Electronically signed by: Osmin Tariq MD  Date:    08/28/2023  Time:    22:34               Narrative:    EXAMINATION:  CTA CHEST ABDOMEN PELVIS    CLINICAL HISTORY:  r/o dissection vs PE, left sided numbness after syncope, frequent belching started today;    TECHNIQUE:  Axial CT angiogram images of the  chest, abdomen, and pelvis were obtained before and after the administration of 100 cc of  Omnipaque 350 IV, from the lung apices through the  ischial tuberosities.  Coronal and sagittal reconstructions of the abdominal aorta and visceral arteries performed.    COMPARISON:  CT abdomen pelvis 09/11/2023    FINDINGS:  SOFT TISSUES: No axillary or subpectoral lymphadenopathy. The visualized thyroid gland is unremarkable.  Right groin postoperative changes likely of inguinal hernia repair.  Anasarca.  Metallic shrapnel fragments present in the subcutaneous soft tissues of the right buttocks, similar compared to 09/11/2021.    HEART AND MEDIASTINUM: No mediastinal or hilar lymphadenopathy. Heart and pericardium are within normal limits.  Coronary calcific atherosclerosis.  Aortic annulus calcification.  Left-sided aortic arch.    PLEURA: No pleural effusion or pneumothorax.    LUNGS AND AIRWAYS: Airways are patent. No confluent consolidation.    HEPATOBILIARY: No focal hepatic lesions. No biliary ductal dilatation.  Surgically absent gallbladder.    SPLEEN: No splenomegaly.    PANCREAS: No focal masses or ductal dilatation.    ADRENALS: Unchanged left adrenal and right adrenal nodular thickening thickening.    KIDNEYS/URETERS: Stable right renal exophytic hypodense lesion measuring 1.4 cm with equivocal enhancement not well characterized in setting of beam hardening artifact.  Other smaller bilateral hypodensities too small to characterize.  No hydronephrosis, or stones.    PELVIC ORGANS/BLADDER: Prostatomegaly.  Urinary bladder is unremarkable.    PERITONEUM / RETROPERITONEUM: No free air or fluid.    LYMPH NODES: No lymphadenopathy.    VESSELS: Moderate scattered aortoiliac atherosclerosis.  No dissection, aneurysm, or hemodynamically significant stenosis.  There is ectasia of the common iliac arteries bilaterally, similar compared to prior.  Portal veins are patent.  Pulmonary arteries are not well evaluated due to poor opacification however there is no definite defect in the main trunk or left or right main pulmonary arteries.    GI TRACT: Persistent fat  attenuation density at the gastroesophageal junction, possible intramural lipoma, similar compared to prior.  No distention or wall thickening. Normal appendix.  Generalized mesenteric edema.  Diverticulosis coli without evidence of acute diverticulitis.    BONES: No fractures or focal osseous lesions.                                       X-Ray Chest 1 View (Final result)  Result time 08/28/23 20:41:03      Final result by Augustin Ramírez DO (08/28/23 20:41:03)                   Impression:      No acute abnormality.      Electronically signed by: Augustin Ramírez  Date:    08/28/2023  Time:    20:41               Narrative:    EXAMINATION:  XR CHEST 1 VIEW    CLINICAL HISTORY:  Shortness of breath    TECHNIQUE:  Single frontal view of the chest was performed.    COMPARISON:  12/17/2012.    FINDINGS:  The lungs are well expanded and clear. No focal opacities are seen. The pleural spaces are clear. The cardiac silhouette is unremarkable. The visualized osseous structures are unremarkable.                                       CTA STROKE MULTI-PHASE (Final result)  Result time 08/28/23 19:28:52      Final result by Donald Garcia MD (08/28/23 19:28:52)                   Impression:      Hypoattenuating foci in the subcortical region of the right frontal lobe, likely remote lacunar type infarct.    No evidence of acute major vascular distribution infarct or hemorrhage.    No high-grade stenosis or major vessel occlusion.    Suspected sequela of chronic microvascular ischemic change.    Additional findings as above.    Additional findings as above.    Electronically signed by resident: Josh Reyez  Date:    08/28/2023  Time:    18:26    Electronically signed by: Donald Garcia MD  Date:    08/28/2023  Time:    19:28               Narrative:    EXAMINATION:  CTA STROKE MULTI-PHASE    CLINICAL HISTORY:  Neuro deficit, acute, stroke suspected;    TECHNIQUE:  Non contrast low dose axial images were obtained thought the  head. CT angiogram was performed from the level of the karyn to the top of the head following the IV administration of 100mL of Omnipaque 350. Sagittal and coronal reconstructions and maximum intensity projection reconstructions were performed. Arterial stenosis percentages are based on NASCET measurement criteria. Two additional phases of immediate post-contrast CTA images were performed through the head alone.    COMPARISON:  CT head 01/09/2023, MRI MRA 04/15/2011    FINDINGS:  Degraded exam by motion artifact.    Intracranial Compartment:    Hypoattenuating foci in the subcortical region of the right frontal lobe, likely remote lacunar type infarct (axial series 2, image 18).  Diffuse supratentorial white matter hypodensities, nonspecific likely sequel of chronic microvascular ischemic changes.    No parenchymal mass, hemorrhage, edema, or major vascular distribution infarct.  Skull base atherosclerotic vascular calcifications noted.    Ventricles and sulci are stable in size and configuration noting cavum septum pellucidum.  No hydrocephalus.  No midline shift.    Stable low-lying cerebellar tonsils.    No extra-axial blood or fluid collection.    Skull/Extracranial Contents (limited evaluation): No fracture. Mastoid air cells are clear.  Moderate mucosal thickening of the mastoid and maxillary sinus bilaterally more on the right with grossly similar suspected inspissated secretions in the right maxillary sinus and few mucous retaining cyst on the left maxillary sinus.  Imaged portions of the orbits are within normal limits.    Non-Vascular Structures of the Neck/Thoracic Inlet (limited evaluation): Age-related degenerative change of the cervical spine most prominent at C5-6 and C6-7 levels with degenerative related grade 1 anterolisthesis of C3 on 4 and C7 on T1 and degenerative related grade 1 retrolisthesis of C5 on 6.  Multiple missing teeth.  Imaged lung apices are clear..    Aorta: 3 vessel left aortic  arch.  Mild aortic calcific atherosclerosis.  Limited by motion artifact ectatic ascending aorta measure 3.9 cm (axial series 5, image 29).    Extracranial carotid circulation: Mild bilateral common carotid calcific atherosclerosis at the level of the bifurcation without high-grade stenosis. No hemodynamically significant stenosis, aneurysmal dilatation, or dissection.    Extracranial vertebral circulation: No hemodynamically significant stenosis, aneurysmal dilatation, or dissection.    Intracranial Arteries:    Anterior circulation: Moderate calcific atherosclerosis of the cavernous branch of the internal carotid arteries. The bilateral distal cervical, petrous, cavernous, and supraclinoid segments of the ICAs are patent without significant focal stenosis or aneurysm.    The anterior middle cerebral arteries are patent without focal stenosis or aneurysm.    Posterior circulation: The distal vertebral arteries, basilar artery and posterior cerebral arteries are patent without focal stenosis or aneurysm    Venous structures (limited evaluation): Unremarkable                                       Medications   iohexoL (OMNIPAQUE 350) injection 100 mL (100 mLs Intravenous Given 8/28/23 1802)   iohexoL (OMNIPAQUE 350) injection 75 mL (75 mLs Intravenous Given 8/28/23 2057)     Medical Decision Making  67-year-old male in no acute distress.  Patient was code stroke from triage, I met the patient back at the CT scanner and obtain brief history.  Patient still complaining of some tingling in the left upper and lower extremities, but no sensory deficit appreciated, patient is VAN negative.    Differential includes but is not limited to acute stroke versus dysrhythmia versus aortic stenosis versus PE versus dissection versus infection versus metabolic derangement versus electrolyte disturbance    EKG showed sinus bradycardia at 57 beats per minute, all intervals within normal limits, no STEMI    Stroke imaging ultimately  did not reveal any evidence of acute stroke, there was some evidence of remote lacunar infarcts.    Lab work was grossly unremarkable, there was no leukocytosis, stable anemia with hemoglobin at 11, there was no evidence of metabolic or significant electrolyte derangement, potassium was slightly low, but EKG did not show any acute changes.    CT of the chest abdomen pelvis also did not reveal any pathology that would reasonably explain the patient's syncope.  There was no evidence of dissection or pulmonary embolism, there were some incidental findings that were shared with the patient, a renal cyst that was seen previously in his stable.  When I mentioned this the patient, he did say that he had increased urinary frequency for the past few days so we sent a UA, which was ultimately negative for infection.    I discussed our findings with the patient who insisted that he did not want to be admitted although that was my recommendation due to high-risk syncope.  The patient says that he has a dog at home that he must let out.  I discussed the risks of him leaving the department without further observation, and he again insisted on leaving.  I said that he is welcome to return to the emergency department and we will continue his care and he said that he may return in the morning.  Patient signed AMA paperwork and was in stable condition when he left the department.    Amount and/or Complexity of Data Reviewed  Labs: ordered.  Radiology: ordered.    Risk  Prescription drug management.                               Clinical Impression:   Final diagnoses:  [I63.9] Stroke  [R55] Syncope  [R06.02] Shortness of breath        ED Disposition Condition    AMA Stable                Manohar Valdes MD  Resident  08/29/23 [id number]

## 2023-09-01 DIAGNOSIS — G47.00 INSOMNIA, UNSPECIFIED TYPE: ICD-10-CM

## 2023-09-01 RX ORDER — TRAZODONE HYDROCHLORIDE 50 MG/1
50 TABLET ORAL NIGHTLY
Qty: 30 TABLET | Refills: 3 | Status: SHIPPED | OUTPATIENT
Start: 2023-09-01

## 2023-09-01 NOTE — TELEPHONE ENCOUNTER
No care due was identified.  [facility] Embedded Care Due Messages. Reference number: [id number].   9/01/2023 9:20:46 AM CDT

## 2023-09-03 ENCOUNTER — HOSPITAL ENCOUNTER (EMERGENCY)
Facility: HOSPITAL | Age: 68
Discharge: HOME | End: 2023-09-03
Attending: EMERGENCY MEDICINE
Payer: MEDICARE

## 2023-09-03 VITALS
WEIGHT: 145 LBS | OXYGEN SATURATION: 100 % | HEART RATE: 61 BPM | DIASTOLIC BLOOD PRESSURE: 98 MMHG | TEMPERATURE: 99 F | BODY MASS INDEX: 23.3 KG/M2 | RESPIRATION RATE: 18 BRPM | SYSTOLIC BLOOD PRESSURE: 201 MMHG | HEIGHT: 66 IN

## 2023-09-03 DIAGNOSIS — R06.00 DYSPNEA: ICD-10-CM

## 2023-09-03 DIAGNOSIS — R06.02 SOB (SHORTNESS OF BREATH): ICD-10-CM

## 2023-09-03 LAB
ALBUMIN SERPL BCP-MCNC: 3.8 G/DL (ref 3.5–5.2)
ALP SERPL-CCNC: 55 U/L (ref 55–135)
ALT SERPL W/O P-5'-P-CCNC: 14 U/L (ref 10–44)
ANION GAP SERPL CALC-SCNC: 9 MMOL/L (ref 8–16)
AST SERPL-CCNC: 25 U/L (ref 10–40)
BASOPHILS # BLD AUTO: 0.04 K/UL (ref 0–0.2)
BASOPHILS NFR BLD: 0.8 % (ref 0–1.9)
BILIRUB SERPL-MCNC: 0.3 MG/DL (ref 0.1–1)
BUN SERPL-MCNC: 20 MG/DL (ref 8–23)
CALCIUM SERPL-MCNC: 9.7 MG/DL (ref 8.7–10.5)
CHLORIDE SERPL-SCNC: 107 MMOL/L (ref 95–110)
CO2 SERPL-SCNC: 24 MMOL/L (ref 23–29)
CREAT SERPL-MCNC: 1.1 MG/DL (ref 0.5–1.4)
DIFFERENTIAL METHOD: ABNORMAL
EOSINOPHIL # BLD AUTO: 0.1 K/UL (ref 0–0.5)
EOSINOPHIL NFR BLD: 1 % (ref 0–8)
ERYTHROCYTE [DISTWIDTH] IN BLOOD BY AUTOMATED COUNT: 13.6 % (ref 11.5–14.5)
EST. GFR  (NO RACE VARIABLE): >60 ML/MIN/1.73 M^2
GLUCOSE SERPL-MCNC: 90 MG/DL (ref 70–110)
HCT VFR BLD AUTO: 35.6 % (ref 40–54)
HGB BLD-MCNC: 11.4 G/DL (ref 14–18)
IMM GRANULOCYTES # BLD AUTO: 0.01 K/UL (ref 0–0.04)
IMM GRANULOCYTES NFR BLD AUTO: 0.2 % (ref 0–0.5)
LYMPHOCYTES # BLD AUTO: 0.9 K/UL (ref 1–4.8)
LYMPHOCYTES NFR BLD: 17.7 % (ref 18–48)
MCH RBC QN AUTO: 32.2 PG (ref 27–31)
MCHC RBC AUTO-ENTMCNC: 32 G/DL (ref 32–36)
MCV RBC AUTO: 101 FL (ref 82–98)
MONOCYTES # BLD AUTO: 0.3 K/UL (ref 0.3–1)
MONOCYTES NFR BLD: 6.4 % (ref 4–15)
NEUTROPHILS # BLD AUTO: 3.6 K/UL (ref 1.8–7.7)
NEUTROPHILS NFR BLD: 73.9 % (ref 38–73)
NRBC BLD-RTO: 0 /100 WBC
PLATELET # BLD AUTO: 236 K/UL (ref 150–450)
PMV BLD AUTO: 9.3 FL (ref 9.2–12.9)
POTASSIUM SERPL-SCNC: 3.9 MMOL/L (ref 3.5–5.1)
PROT SERPL-MCNC: 7.3 G/DL (ref 6–8.4)
RBC # BLD AUTO: 3.54 M/UL (ref 4.6–6.2)
SODIUM SERPL-SCNC: 140 MMOL/L (ref 136–145)
WBC # BLD AUTO: 4.85 K/UL (ref 3.9–12.7)

## 2023-09-03 PROCEDURE — 93010 EKG 12-LEAD: ICD-10-PCS | Mod: ,,, | Performed by: INTERNAL MEDICINE

## 2023-09-03 PROCEDURE — 93005 ELECTROCARDIOGRAM TRACING: CPT

## 2023-09-03 PROCEDURE — 96361 HYDRATE IV INFUSION ADD-ON: CPT

## 2023-09-03 PROCEDURE — 25000003 PHARM REV CODE 250: Performed by: STUDENT IN AN ORGANIZED HEALTH CARE EDUCATION/TRAINING PROGRAM

## 2023-09-03 PROCEDURE — 93010 ELECTROCARDIOGRAM REPORT: CPT | Mod: ,,, | Performed by: INTERNAL MEDICINE

## 2023-09-03 PROCEDURE — 85025 COMPLETE CBC W/AUTO DIFF WBC: CPT | Performed by: STUDENT IN AN ORGANIZED HEALTH CARE EDUCATION/TRAINING PROGRAM

## 2023-09-03 PROCEDURE — 96360 HYDRATION IV INFUSION INIT: CPT

## 2023-09-03 PROCEDURE — 99285 EMERGENCY DEPT VISIT HI MDM: CPT | Mod: 25

## 2023-09-03 PROCEDURE — 80053 COMPREHEN METABOLIC PANEL: CPT | Performed by: STUDENT IN AN ORGANIZED HEALTH CARE EDUCATION/TRAINING PROGRAM

## 2023-09-03 RX ORDER — AMLODIPINE BESYLATE 10 MG/1
10 TABLET ORAL
Status: COMPLETED | OUTPATIENT
Start: 2023-09-03 | End: 2023-09-03

## 2023-09-03 RX ADMIN — AMLODIPINE BESYLATE 10 MG: 10 TABLET ORAL at 10:09

## 2023-09-03 RX ADMIN — SODIUM CHLORIDE 1000 ML: 9 INJECTION, SOLUTION INTRAVENOUS at 10:09

## 2023-09-03 NOTE — ED TRIAGE NOTES
Pt presents to ED via personal transport. Pt reports experiencing SOB and dizziness beginning around approximately 0630 today after eating. Pt reports BP of 70/30 this morning, states he has not taken BP meds x 1 week. Pt reports previously being in hospital for dizziness 1 week ago.

## 2023-09-03 NOTE — ED PROVIDER NOTES
Encounter Date: 9/3/2023       History     Chief Complaint   Patient presents with    Shortness of Breath     States feeling faintness, was admitted recently, bp states 40/20 at home     Pt is 68 yo man w/ PMH of HTN, GERD, and KHARI who presents with chief complaint of shortness of breath and low blood pressure. He said he awoke and ate breakfast and started feeling weak, short of breath, and like he was about to faint. He took his BP with a home BP cuff and says it read 40/20. He then ate a bit of salt and decided to come to the ED. He says that this feels similar to what brought him to the ED last week although he is not having any numbness or paresthesias. He endorses nausea, weakness, headache, lightheadedness, and dizziness. He denies loss of consciousness, vomiting, chest pain, chest tightness, abdominal pain, fevers, recent illnesses. He also says he has been feeling cold lately. He and his son are worried that he might be anemic.     The history is provided by the patient and a relative.     Review of patient's allergies indicates:   Allergen Reactions    Lactose intolerance  [lactase]      Other reaction(s): Diarrhea    Penicillins Other (See Comments)     Swelling of lymph nodes     Past Medical History:   Diagnosis Date    Allergy     Back pain     Hypertension     Mixed hyperlipidemia 7/3/2018    Obesity     Reflux     Sleep apnea 6/21/2013     Past Surgical History:   Procedure Laterality Date    CHOLECYSTECTOMY  2012    COLONOSCOPY N/A 1/3/2023    Procedure: COLONOSCOPY;  Surgeon: Kiera Fonseca MD;  Location: [facility];  Service: Endoscopy;  Laterality: N/A;    ESOPHAGOGASTRODUODENOSCOPY N/A 1/3/2023    Procedure: EGD (ESOPHAGOGASTRODUODENOSCOPY);  Surgeon: Kiera Fonseca MD;  Location: [facility];  Service: Endoscopy;  Laterality: N/A;    HERNIA REPAIR      RHINOPLASTY       Family History   Problem Relation Age of Onset    Cancer Mother 65        exposure related    Heart disease Father      "Colon cancer Neg Hx     Esophageal cancer Neg Hx     Diabetes Neg Hx      Social History     Tobacco Use    Smoking status: Never    Smokeless tobacco: Never   Substance Use Topics    Alcohol use: Yes     Alcohol/week: 3.0 standard drinks of alcohol     Types: 3 Cans of beer per week     Comment: 3 times per week    Drug use: Yes     Types: Marijuana     Comment: smoked last yuesterday       Physical Exam     Initial Vitals [09/03/23 0834]   BP Pulse Resp Temp SpO2   130/76 60 20 98.6 °F (37 °C) 99 %      MAP       --         Physical Exam    Constitutional: He appears well-developed and well-nourished.   HENT:   Head: Normocephalic and atraumatic.   Eyes: Conjunctivae and EOM are normal.   Cardiovascular:  Normal rate and regular rhythm.           Pulmonary/Chest: Breath sounds normal. No respiratory distress. He has no wheezes.   Abdominal: Abdomen is soft. He exhibits no distension. There is no abdominal tenderness.     Neurological: He is alert and oriented to person, place, and time.   Skin: Skin is warm and dry.   Psychiatric: He has a normal mood and affect.         ED Course   Procedures  Labs Reviewed   CBC W/ AUTO DIFFERENTIAL - Abnormal; Notable for the following components:       Result Value    RBC 3.54 (*)     Hemoglobin 11.4 (*)     Hematocrit 35.6 (*)      (*)     MCH 32.2 (*)     Lymph # 0.9 (*)     Gran % 73.9 (*)     Lymph % 17.7 (*)     All other components within normal limits   COMPREHENSIVE METABOLIC PANEL          Imaging Results              X-Ray Chest 1 View (Final result)  Result time 09/03/23 11:24:22      Final result by Tacos Luna MD (09/03/23 11:24:22)                   Impression:      No detrimental change when compared with 08/28/2023.      Electronically signed by: Tacos Luna MD  Date:    09/03/2023  Time:    11:24               Narrative:    EXAMINATION:  XR CHEST 1 VIEW    CLINICAL HISTORY:  Provided history is "  Dyspnea, unspecified".    TECHNIQUE:  One " view of the chest.    COMPARISON:  08/28/2023.    FINDINGS:  Patient is slightly rotated.  Cardiomediastinal silhouette is stable.  No confluent area of consolidation.  No large pleural effusion.  No pneumothorax.                                       Medications   sodium chloride 0.9% bolus 1,000 mL 1,000 mL (0 mLs Intravenous Stopped 9/3/23 1211)   amLODIPine tablet 10 mg (10 mg Oral Given 9/3/23 1058)     Medical Decision Making  Pt is 68 yo man w/ PMH of HTN, GERD, and KHARI who presents with chief complaint of shortness of breath and low blood pressure. In ED, vital signs stable and wnl. Exam unremarkable. Given 1 L NS. Later found to be hypertensive with systolic BP over 200. Noted he has not taken his home BP meds since he was here a week ago. Gave home amlodipine. Repeat blood pressure 187/101. EKG showing normal sinus rhythm. CXR not showing acute intrathoracic process. Pt remained stable with no dyspnea in the ED. Discharging home with advice to restart taking home blood pressure meds and f/u with PCP to check functioning of home BP cuff.    Differential:  Pneumonia  GERD  Anemia  Anxiety    Amount and/or Complexity of Data Reviewed  Labs: ordered. Decision-making details documented in ED Course.  Radiology: ordered. Decision-making details documented in ED Course.  ECG/medicine tests: ordered. Decision-making details documented in ED Course.    Risk  Prescription drug management.              Attending Attestation:             Attending ED Notes:   Attending Note:  I have seen the patient, have repeated the key portions of the history and physical, reviewed and agree with the medical documentation, and supervised and managed the medical care of the patient. Additionally, I was present for the critical portion of any procedure(s) performed.      67 M here for reported hypotension ( 70/30) with generalized weakness and shortness of breath.    BP stable on arrival.  Orthostatic negative.  Labs unremarkable.   EKG with no ischemic changes.    Patient actually hypertensive.  Checked on bilateral arms, equal bilaterally.  Concerned the patient's BP cuff at home is inaccurate and he has been holding his blood pressure medication.  I I suggested he return his blood pressure cuff and purchase a new one or check his BP at the pharmacy.  I have urged him to continue his blood pressure medication.  Follow-up with PCP for further evaluation.    SIMI Gruber MD  Staff ED Physician  09/03/2023 12:24 PM                            Clinical Impression:   Final diagnoses:  [R06.02] SOB (shortness of breath)  [R06.00] Dyspnea        ED Disposition Condition    Discharge Stable          ED Prescriptions    None       Follow-up Information       Follow up With Specialties Details Why Contact Info    Jamaal Tellez MD Family Medicine Schedule an appointment as soon as possible for a visit in 1 week  [address] [facility]  [place] [address]  [phone]               Maureen Lang MD  Resident  09/03/23 1216       Aleshia Gruber MD  09/03/23 1226

## 2023-09-03 NOTE — DISCHARGE INSTRUCTIONS
It was a pleasure taking care of you today!     Diagnosis: shortness of breath    Follow-Up Plan:  - Follow-up with primary care doctor within 3 - 5 days to discuss your recent ER visit and any additional concerns that you may have including your home blood pressure cuff  - Continue taking your blood pressure medications as directed  - Additional testing and/or evaluation as directed by your primary doctor    Return to the Emergency Department for symptoms including but not limited to: persistence or worsening of symptoms, shortness of breath or chest pain, inability to drink without vomiting, passing out/fainting/ loss of consciousness, or if you have other concerns.

## 2023-09-13 ENCOUNTER — PATIENT MESSAGE (OUTPATIENT)
Dept: ADMINISTRATIVE | Facility: HOSPITAL | Age: 68
End: 2023-09-13
Payer: MEDICARE

## 2023-09-16 ENCOUNTER — HOSPITAL ENCOUNTER (EMERGENCY)
Facility: HOSPITAL | Age: 68
Discharge: HOME | End: 2023-09-16
Attending: EMERGENCY MEDICINE
Payer: MEDICARE

## 2023-09-16 VITALS
TEMPERATURE: 98 F | WEIGHT: 145 LBS | HEART RATE: 70 BPM | DIASTOLIC BLOOD PRESSURE: 80 MMHG | OXYGEN SATURATION: 98 % | SYSTOLIC BLOOD PRESSURE: 144 MMHG | RESPIRATION RATE: 16 BRPM | HEIGHT: 66 IN | BODY MASS INDEX: 23.3 KG/M2

## 2023-09-16 DIAGNOSIS — R07.9 CHEST PAIN: Primary | ICD-10-CM

## 2023-09-16 DIAGNOSIS — M25.519 SHOULDER PAIN: ICD-10-CM

## 2023-09-16 LAB
ALBUMIN SERPL BCP-MCNC: 4.1 G/DL (ref 3.5–5.2)
ALP SERPL-CCNC: 57 U/L (ref 55–135)
ALT SERPL W/O P-5'-P-CCNC: 19 U/L (ref 10–44)
ANION GAP SERPL CALC-SCNC: 10 MMOL/L (ref 8–16)
AST SERPL-CCNC: 26 U/L (ref 10–40)
BASOPHILS # BLD AUTO: 0.02 K/UL (ref 0–0.2)
BASOPHILS NFR BLD: 0.3 % (ref 0–1.9)
BILIRUB SERPL-MCNC: 0.5 MG/DL (ref 0.1–1)
BUN SERPL-MCNC: 17 MG/DL (ref 8–23)
CALCIUM SERPL-MCNC: 10.2 MG/DL (ref 8.7–10.5)
CHLORIDE SERPL-SCNC: 103 MMOL/L (ref 95–110)
CO2 SERPL-SCNC: 27 MMOL/L (ref 23–29)
CREAT SERPL-MCNC: 1.1 MG/DL (ref 0.5–1.4)
DIFFERENTIAL METHOD: ABNORMAL
EOSINOPHIL # BLD AUTO: 0.1 K/UL (ref 0–0.5)
EOSINOPHIL NFR BLD: 1 % (ref 0–8)
ERYTHROCYTE [DISTWIDTH] IN BLOOD BY AUTOMATED COUNT: 13.5 % (ref 11.5–14.5)
EST. GFR  (NO RACE VARIABLE): >60 ML/MIN/1.73 M^2
GLUCOSE SERPL-MCNC: 90 MG/DL (ref 70–110)
HCT VFR BLD AUTO: 35.7 % (ref 40–54)
HGB BLD-MCNC: 11.5 G/DL (ref 14–18)
IMM GRANULOCYTES # BLD AUTO: 0.01 K/UL (ref 0–0.04)
IMM GRANULOCYTES NFR BLD AUTO: 0.2 % (ref 0–0.5)
LYMPHOCYTES # BLD AUTO: 0.9 K/UL (ref 1–4.8)
LYMPHOCYTES NFR BLD: 15.1 % (ref 18–48)
MCH RBC QN AUTO: 32.5 PG (ref 27–31)
MCHC RBC AUTO-ENTMCNC: 32.2 G/DL (ref 32–36)
MCV RBC AUTO: 101 FL (ref 82–98)
MONOCYTES # BLD AUTO: 0.7 K/UL (ref 0.3–1)
MONOCYTES NFR BLD: 10.7 % (ref 4–15)
NEUTROPHILS # BLD AUTO: 4.5 K/UL (ref 1.8–7.7)
NEUTROPHILS NFR BLD: 72.7 % (ref 38–73)
NRBC BLD-RTO: 0 /100 WBC
PLATELET # BLD AUTO: 269 K/UL (ref 150–450)
PMV BLD AUTO: 9.7 FL (ref 9.2–12.9)
POTASSIUM SERPL-SCNC: 3.6 MMOL/L (ref 3.5–5.1)
PROT SERPL-MCNC: 7.8 G/DL (ref 6–8.4)
RBC # BLD AUTO: 3.54 M/UL (ref 4.6–6.2)
SODIUM SERPL-SCNC: 140 MMOL/L (ref 136–145)
TROPONIN I SERPL DL<=0.01 NG/ML-MCNC: 0.01 NG/ML (ref 0–0.03)
TROPONIN I SERPL DL<=0.01 NG/ML-MCNC: 0.01 NG/ML (ref 0–0.03)
WBC # BLD AUTO: 6.14 K/UL (ref 3.9–12.7)

## 2023-09-16 PROCEDURE — 93010 ELECTROCARDIOGRAM REPORT: CPT | Mod: ,,, | Performed by: INTERNAL MEDICINE

## 2023-09-16 PROCEDURE — 93010 EKG 12-LEAD: ICD-10-PCS | Mod: ,,, | Performed by: INTERNAL MEDICINE

## 2023-09-16 PROCEDURE — 96374 THER/PROPH/DIAG INJ IV PUSH: CPT | Mod: 59

## 2023-09-16 PROCEDURE — 93005 ELECTROCARDIOGRAM TRACING: CPT

## 2023-09-16 PROCEDURE — C9113 INJ PANTOPRAZOLE SODIUM, VIA: HCPCS | Performed by: PHYSICIAN ASSISTANT

## 2023-09-16 PROCEDURE — 84484 ASSAY OF TROPONIN QUANT: CPT | Mod: 91 | Performed by: PHYSICIAN ASSISTANT

## 2023-09-16 PROCEDURE — 80053 COMPREHEN METABOLIC PANEL: CPT | Performed by: PHYSICIAN ASSISTANT

## 2023-09-16 PROCEDURE — 25500020 PHARM REV CODE 255: Performed by: EMERGENCY MEDICINE

## 2023-09-16 PROCEDURE — 85025 COMPLETE CBC W/AUTO DIFF WBC: CPT | Performed by: PHYSICIAN ASSISTANT

## 2023-09-16 PROCEDURE — 25000003 PHARM REV CODE 250: Performed by: PHYSICIAN ASSISTANT

## 2023-09-16 PROCEDURE — 63600175 PHARM REV CODE 636 W HCPCS: Performed by: PHYSICIAN ASSISTANT

## 2023-09-16 PROCEDURE — 99285 EMERGENCY DEPT VISIT HI MDM: CPT | Mod: 25

## 2023-09-16 RX ORDER — ACETAMINOPHEN 500 MG
1000 TABLET ORAL
Status: COMPLETED | OUTPATIENT
Start: 2023-09-16 | End: 2023-09-16

## 2023-09-16 RX ORDER — SIMETHICONE 80 MG
1 TABLET,CHEWABLE ORAL
Status: COMPLETED | OUTPATIENT
Start: 2023-09-16 | End: 2023-09-16

## 2023-09-16 RX ORDER — ASPIRIN 325 MG
325 TABLET ORAL
Status: COMPLETED | OUTPATIENT
Start: 2023-09-16 | End: 2023-09-16

## 2023-09-16 RX ORDER — MAG HYDROX/ALUMINUM HYD/SIMETH 200-200-20
5 SUSPENSION, ORAL (FINAL DOSE FORM) ORAL
Status: COMPLETED | OUTPATIENT
Start: 2023-09-16 | End: 2023-09-16

## 2023-09-16 RX ORDER — PANTOPRAZOLE SODIUM 40 MG/10ML
40 INJECTION, POWDER, LYOPHILIZED, FOR SOLUTION INTRAVENOUS
Status: COMPLETED | OUTPATIENT
Start: 2023-09-16 | End: 2023-09-16

## 2023-09-16 RX ADMIN — ASPIRIN 325 MG ORAL TABLET 325 MG: 325 PILL ORAL at 02:09

## 2023-09-16 RX ADMIN — PANTOPRAZOLE SODIUM 40 MG: 40 INJECTION, POWDER, FOR SOLUTION INTRAVENOUS at 02:09

## 2023-09-16 RX ADMIN — ACETAMINOPHEN 1000 MG: 500 TABLET ORAL at 03:09

## 2023-09-16 RX ADMIN — ALUMINUM HYDROXIDE, MAGNESIUM HYDROXIDE, AND SIMETHICONE 5 ML: 200; 200; 20 SUSPENSION ORAL at 02:09

## 2023-09-16 RX ADMIN — IOHEXOL 100 ML: 350 INJECTION, SOLUTION INTRAVENOUS at 04:09

## 2023-09-16 RX ADMIN — SIMETHICONE 80 MG: 80 TABLET, CHEWABLE ORAL at 02:09

## 2023-09-16 NOTE — PROVIDER PROGRESS NOTES - EMERGENCY DEPT.
Encounter Date: 9/16/2023    ED Physician Progress Notes          Patient signed out to me by my colleague with instructions to follow-up pending work-up. Please see main ED note for previous ED stay documentation.     Trop negative x2. CTA chest negative for PE. RUE U/S negative for DVT. Patient resting comfortably on reassessment and denies any medical complaints. Heart score 3. Okay for outpatient follow-up with PCP and Cardiology. Patient expresses understanding and agreeable to the plan. Return to ED precautions given for new, worsening, or concerning symptoms.     ED Diagnosis:  Final diagnoses:  [M25.519] Shoulder pain  [R07.9] Chest pain (Primary)    ED Disposition:   ED Disposition Condition    Discharge Stable

## 2023-09-16 NOTE — DISCHARGE INSTRUCTIONS
Follow-up with your primary care provider for further evaluation.    Return to the emergency room for new, worsening, or concerning symptoms.

## 2023-09-16 NOTE — ED TRIAGE NOTES
"The patient endorses Non traumatic right shoulder pain radiating down the arm since last night with belching. "Nothing but gas coming up" . "Feels like sleep deprivation is involved". Hasn't slept in 3 days  The patient has been here three times for the last month for the same thing. The patient also endorses generalized fatigue and weakness.    "

## 2023-09-16 NOTE — ED PROVIDER NOTES
"Encounter Date: 9/16/2023       History     Chief Complaint   Patient presents with    Shoulder Pain     Non traumatic right shoulder pain since last night with belching . "Nothing but gas coming up" . "Feels like sleep deprivation is involved". Hasn't slept in 3 days      67-year-old male with hypertension, lipidemia, GERD presents to the ED with chest and arm pain.  Patient describes a throbbing, constant pain in his right chest, shoulder and radiating down the right upper extremity.  The pain also radiates into the side and back of his right neck.  Symptoms began last night.  Patient also reports persistent and excessive belching.  He denies nausea, vomiting.  He feels short of breath but notes that this gets relieved when he belches.  He denies injury, fever.  Denies history of similar symptoms.  Denies cardiac history.  He states that his father had an "enlarged heart".  Patient denies smoking history.  Denies weakness or numbness to the extremities.      Review of patient's allergies indicates:   Allergen Reactions    Lactose intolerance  [lactase]      Other reaction(s): Diarrhea    Penicillins Other (See Comments)     Swelling of lymph nodes     Past Medical History:   Diagnosis Date    Allergy     Back pain     Hypertension     Mixed hyperlipidemia 7/3/2018    Obesity     Reflux     Sleep apnea 6/21/2013     Past Surgical History:   Procedure Laterality Date    CHOLECYSTECTOMY  2012    COLONOSCOPY N/A 1/3/2023    Procedure: COLONOSCOPY;  Surgeon: Kiera Fonseca MD;  Location: [facility];  Service: Endoscopy;  Laterality: N/A;    ESOPHAGOGASTRODUODENOSCOPY N/A 1/3/2023    Procedure: EGD (ESOPHAGOGASTRODUODENOSCOPY);  Surgeon: Kiera Fonseca MD;  Location: [facility];  Service: Endoscopy;  Laterality: N/A;    HERNIA REPAIR      RHINOPLASTY       Family History   Problem Relation Age of Onset    Cancer Mother 65        exposure related    Heart disease Father     Colon cancer Neg Hx     Esophageal " cancer Neg Hx     Diabetes Neg Hx      Social History     Tobacco Use    Smoking status: Never    Smokeless tobacco: Never   Substance Use Topics    Alcohol use: Yes     Alcohol/week: 3.0 standard drinks of alcohol     Types: 3 Cans of beer per week     Comment: 3 times per week    Drug use: Yes     Types: Marijuana     Comment: smoked last yuesterday     Review of Systems   Respiratory:  Positive for shortness of breath.    Cardiovascular:  Positive for chest pain.   Musculoskeletal:  Positive for neck pain.       Physical Exam     Initial Vitals [09/16/23 1228]   BP Pulse Resp Temp SpO2   (!) 186/92 64 19 99.5 °F (37.5 °C) 99 %      MAP       --         Physical Exam    Nursing note and vitals reviewed.  Constitutional: He appears well-developed and well-nourished.  Non-toxic appearance. He does not appear ill. No distress.   Persistent belching throughout exam   HENT:   Head: Normocephalic and atraumatic.   Neck: Neck supple.   Normal range of motion.  Cardiovascular:  Normal rate and regular rhythm.     Exam reveals no gallop, no distant heart sounds and no friction rub.       No murmur heard.  Pulses:       Radial pulses are 2+ on the right side and 2+ on the left side.   Pulmonary/Chest: Effort normal and breath sounds normal. No accessory muscle usage. No tachypnea. No respiratory distress. He has no decreased breath sounds. He has no wheezes. He has no rhonchi. He has no rales.   Abdominal: He exhibits no distension.   Musculoskeletal:      Cervical back: Normal range of motion and neck supple.      Comments: TTP to the RUE without swelling or skin color change. TTP to the R lateral and R posterior neck. Pain is somewhat reproduced with ROM of the R shoulder. Radial pulse intact.      Neurological: He is alert.   Skin: No rash noted.         ED Course   Procedures  Labs Reviewed   CBC W/ AUTO DIFFERENTIAL - Abnormal; Notable for the following components:       Result Value    RBC 3.54 (*)     Hemoglobin 11.5  (*)     Hematocrit 35.7 (*)      (*)     MCH 32.5 (*)     Lymph # 0.9 (*)     Lymph % 15.1 (*)     All other components within normal limits   COMPREHENSIVE METABOLIC PANEL   TROPONIN I   TROPONIN I          Imaging Results              CTA Chest Aorta Non Coronary (In process)                      X-Ray Chest PA And Lateral (Final result)  Result time 09/16/23 14:55:39      Final result by Nito Guadarrama MD (09/16/23 14:55:39)                   Impression:      No acute process.      Electronically signed by: Nito Guadarrama MD  Date:    09/16/2023  Time:    14:55               Narrative:    EXAMINATION:  XR CHEST PA AND LATERAL    CLINICAL HISTORY:  Chest pain, unspecified    TECHNIQUE:  PA and lateral views of the chest were performed.    COMPARISON:  09/03/2023.    FINDINGS:  The trachea is unremarkable.  There are calcifications of the aortic knob.  The cardiomediastinal silhouette is within normal limits.  There is no evidence of free air beneath the hemidiaphragms.  There are no pleural effusions.  There is no evidence of a pneumothorax.  There is no evidence of pneumomediastinum.  No airspace opacity is present.  The osseous structures demonstrate degenerative changes.                                       Medications   aluminum-magnesium hydroxide-simethicone 200-200-20 mg/5 mL suspension 5 mL (5 mLs Oral Given 9/16/23 1401)   simethicone chewable tablet 80 mg (80 mg Oral Given 9/16/23 1402)   aspirin tablet 325 mg (325 mg Oral Given 9/16/23 1402)   pantoprazole injection 40 mg (40 mg Intravenous Given 9/16/23 1448)   acetaminophen tablet 1,000 mg (1,000 mg Oral Given 9/16/23 1545)   iohexoL (OMNIPAQUE 350) injection 100 mL (100 mLs Intravenous Given 9/16/23 1606)     Medical Decision Making  67-year-old male presents to the ED with right-sided chest, arm, shoulder, neck pain with onset last night.  Hypertensive to 186/92.  Patient appears slightly uncomfortable but is in no acute distress.  Afebrile.   See physical exam details above.  No concerning ST changes or other abnormalities on EKG.  Initial troponin is negative.  CTA chest as well as a DVT study of the right upper extremity was ordered.  Imaging is pending at this time.  Patient signed out to fellow PA at shift end pending results, patient response to treatment and final disposition. I have reviewed the patient's records and discussed this case with my supervising physician.     Amount and/or Complexity of Data Reviewed  Labs: ordered.  Radiology: ordered.    Risk  OTC drugs.  Prescription drug management.              Attending Attestation:     Physician Attestation Statement for NP/PA:   I have directed and reviewed the workup performed by the PA/NP.  I performed the substantive portion of the medical decision making.                          Medical Decision Making:   History:   Old Medical Records: I decided to obtain old medical records.  Differential Diagnosis:   My differential diagnosis includes but is not limited to:  atypical ACS, GERD, radiculopathy,  Clinical Tests:   Lab Tests: Ordered  Radiological Study: Ordered  Medical Tests: Ordered      Clinical Impression:   Final diagnoses:  [M25.519] Shoulder pain  [R07.9] Chest pain               Batool Lal PA-C  09/16/23 1625       Danielle Diane MD  09/17/23 0599

## 2023-09-18 ENCOUNTER — TELEPHONE (OUTPATIENT)
Dept: ADMINISTRATIVE | Facility: CLINIC | Age: 68
End: 2023-09-18
Payer: MEDICARE

## 2023-09-18 NOTE — TELEPHONE ENCOUNTER
Patient outreached twice.  Left voicemail first attempt, but unable to leave voicemail on second attempt.

## 2023-09-26 ENCOUNTER — TELEPHONE (OUTPATIENT)
Dept: NEUROLOGY | Facility: CLINIC | Age: 68
End: 2023-09-26
Payer: MEDICARE

## 2023-09-26 NOTE — TELEPHONE ENCOUNTER
Called and spoke with wife. Discussed all information is sent to the insurance company by the billing department. If she needs a copy of the records for their personal files, she needs to complete a release of information form or contact release of information. Wife v/u.

## 2023-09-26 NOTE — TELEPHONE ENCOUNTER
----- Message from Andrew Ch sent at 9/26/2023 11:15 AM CDT -----  Regarding: Advisement  Contact: @[phone]  Patients wife is calling because she would like to speak to someone about the patients visit and would like to get the information over to the patients insurance company.Please call and advise @[phone] (Bryn Alvarez)

## 2023-11-06 ENCOUNTER — PATIENT MESSAGE (OUTPATIENT)
Dept: ADMINISTRATIVE | Facility: HOSPITAL | Age: 68
End: 2023-11-06
Payer: MEDICARE

## 2023-11-06 DIAGNOSIS — I10 ESSENTIAL HYPERTENSION: Chronic | ICD-10-CM

## 2023-11-06 DIAGNOSIS — I10 HYPERTENSION, UNSPECIFIED TYPE: ICD-10-CM

## 2023-11-06 RX ORDER — LISINOPRIL 40 MG/1
40 TABLET ORAL DAILY
Qty: 90 TABLET | Refills: 1 | Status: SHIPPED | OUTPATIENT
Start: 2023-11-06

## 2023-11-06 RX ORDER — AMLODIPINE BESYLATE 10 MG/1
10 TABLET ORAL DAILY
Qty: 90 TABLET | Refills: 1 | Status: SHIPPED | OUTPATIENT
Start: 2023-11-06

## 2023-11-06 NOTE — TELEPHONE ENCOUNTER
No care due was identified.  [facility] Embedded Care Due Messages. Reference number: [id number].   11/06/2023 7:25:48 AM CST

## 2023-11-06 NOTE — TELEPHONE ENCOUNTER
----- Message from Earline Soliz sent at 11/6/2023  7:11 AM CST -----  Type: RX Refill Request    Who Called: Self     Have you contacted your pharmacy:Yes     Refill or New Rx:REFILL     RX Name and Strength:    1. amLODIPine (NORVASC) 10 MG tablet 30 tablet   2. lisinopriL (PRINIVIL,ZESTRIL) 40 MG tablet 30 tablet     Preferred Pharmacy with phone number:[address]   Phone:  [phone]  Fax:  [phone]    Local or Mail Order:Local     Would the patient rather a call back or a response via [facility]? CALL     Best Call Back Number: [phone] (home)

## 2023-12-12 ENCOUNTER — PATIENT OUTREACH (OUTPATIENT)
Dept: ADMINISTRATIVE | Facility: HOSPITAL | Age: 68
End: 2023-12-12
Payer: MEDICARE

## 2023-12-14 ENCOUNTER — HOSPITAL ENCOUNTER (EMERGENCY)
Facility: HOSPITAL | Age: 68
Discharge: HOME | End: 2023-12-14
Attending: EMERGENCY MEDICINE
Payer: MEDICARE

## 2023-12-14 VITALS
HEART RATE: 46 BPM | OXYGEN SATURATION: 97 % | WEIGHT: 145 LBS | RESPIRATION RATE: 16 BRPM | DIASTOLIC BLOOD PRESSURE: 97 MMHG | BODY MASS INDEX: 23.4 KG/M2 | SYSTOLIC BLOOD PRESSURE: 184 MMHG | TEMPERATURE: 98 F

## 2023-12-14 DIAGNOSIS — K62.5 BLOOD PER RECTUM: Primary | ICD-10-CM

## 2023-12-14 DIAGNOSIS — I10 HYPERTENSION, UNSPECIFIED TYPE: ICD-10-CM

## 2023-12-14 DIAGNOSIS — M25.551 PAIN OF RIGHT HIP: ICD-10-CM

## 2023-12-14 LAB
BASOPHILS # BLD AUTO: 0.04 K/UL (ref 0–0.2)
BASOPHILS NFR BLD: 1.1 % (ref 0–1.9)
DIFFERENTIAL METHOD: ABNORMAL
EOSINOPHIL # BLD AUTO: 0.1 K/UL (ref 0–0.5)
EOSINOPHIL NFR BLD: 2 % (ref 0–8)
ERYTHROCYTE [DISTWIDTH] IN BLOOD BY AUTOMATED COUNT: 13 % (ref 11.5–14.5)
HCT VFR BLD AUTO: 33.5 % (ref 40–54)
HGB BLD-MCNC: 11.2 G/DL (ref 14–18)
IMM GRANULOCYTES # BLD AUTO: 0.01 K/UL (ref 0–0.04)
IMM GRANULOCYTES NFR BLD AUTO: 0.3 % (ref 0–0.5)
LYMPHOCYTES # BLD AUTO: 0.9 K/UL (ref 1–4.8)
LYMPHOCYTES NFR BLD: 26.3 % (ref 18–48)
MCH RBC QN AUTO: 33.5 PG (ref 27–31)
MCHC RBC AUTO-ENTMCNC: 33.4 G/DL (ref 32–36)
MCV RBC AUTO: 100 FL (ref 82–98)
MONOCYTES # BLD AUTO: 0.4 K/UL (ref 0.3–1)
MONOCYTES NFR BLD: 10.2 % (ref 4–15)
NEUTROPHILS # BLD AUTO: 2.1 K/UL (ref 1.8–7.7)
NEUTROPHILS NFR BLD: 60.1 % (ref 38–73)
NRBC BLD-RTO: 0 /100 WBC
PLATELET # BLD AUTO: 262 K/UL (ref 150–450)
PMV BLD AUTO: 9.4 FL (ref 9.2–12.9)
RBC # BLD AUTO: 3.34 M/UL (ref 4.6–6.2)
WBC # BLD AUTO: 3.53 K/UL (ref 3.9–12.7)

## 2023-12-14 PROCEDURE — 99284 EMERGENCY DEPT VISIT MOD MDM: CPT

## 2023-12-14 PROCEDURE — 85025 COMPLETE CBC W/AUTO DIFF WBC: CPT | Performed by: EMERGENCY MEDICINE

## 2023-12-14 RX ORDER — FAMOTIDINE 20 MG/1
20 TABLET, FILM COATED ORAL 2 TIMES DAILY
Qty: 60 TABLET | Refills: 0 | Status: SHIPPED | OUTPATIENT
Start: 2023-12-14 | End: 2024-12-13

## 2023-12-14 RX ORDER — OMEPRAZOLE 40 MG/1
40 CAPSULE, DELAYED RELEASE ORAL DAILY
Qty: 30 CAPSULE | Refills: 0 | Status: SHIPPED | OUTPATIENT
Start: 2023-12-14 | End: 2024-12-13

## 2023-12-14 RX ORDER — LIDOCAINE 50 MG/G
1 PATCH TOPICAL DAILY
Qty: 15 PATCH | Refills: 0 | Status: SHIPPED | OUTPATIENT
Start: 2023-12-14 | End: 2023-12-29
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
## 2023-12-14 NOTE — DISCHARGE INSTRUCTIONS
Place a Lidoderm patch over your peak area of pain on your right hip     Take over the counter acetaminophen 1000mg or every 6 hours as needed for pain and inflammation    Return to the emergency department immediately if any new or concerning symptoms occur    The gastroenterology clinic should contact you to help arrange a follow-up.  If you throw up any blood or anything that looks black.  If you have any black or bloody bowel movements, return to the emergency department    Follow-up with your primary care in 5 days for repeat blood pressure check

## 2023-12-27 ENCOUNTER — OFFICE VISIT (OUTPATIENT)
Dept: FAMILY MEDICINE | Facility: CLINIC | Age: 68
End: 2023-12-27
Payer: MEDICARE

## 2023-12-27 VITALS
TEMPERATURE: 98 F | BODY MASS INDEX: 24.4 KG/M2 | WEIGHT: 151.81 LBS | HEART RATE: 60 BPM | HEIGHT: 66 IN | OXYGEN SATURATION: 97 % | SYSTOLIC BLOOD PRESSURE: 140 MMHG | DIASTOLIC BLOOD PRESSURE: 70 MMHG

## 2023-12-27 DIAGNOSIS — M54.50 LUMBAR PAIN: ICD-10-CM

## 2023-12-27 DIAGNOSIS — M54.2 CERVICAL PAIN: ICD-10-CM

## 2023-12-27 DIAGNOSIS — Z09 FOLLOW-UP EXAM: Primary | ICD-10-CM

## 2023-12-27 PROCEDURE — 1159F MED LIST DOCD IN RCRD: CPT | Mod: CPTII,S$GLB,,

## 2023-12-27 PROCEDURE — 4010F PR ACE/ARB THEARPY RXD/TAKEN: ICD-10-PCS | Mod: CPTII,S$GLB,,

## 2023-12-27 PROCEDURE — 3288F FALL RISK ASSESSMENT DOCD: CPT | Mod: CPTII,S$GLB,,

## 2023-12-27 PROCEDURE — 99999 PR PBB SHADOW E&M-EST. PATIENT-LVL V: CPT | Mod: PBBFAC,,,

## 2023-12-27 PROCEDURE — 3077F PR MOST RECENT SYSTOLIC BLOOD PRESSURE >= 140 MM HG: ICD-10-PCS | Mod: CPTII,S$GLB,,

## 2023-12-27 PROCEDURE — 99999 PR PBB SHADOW E&M-EST. PATIENT-LVL V: ICD-10-PCS | Mod: PBBFAC,,,

## 2023-12-27 PROCEDURE — 99214 OFFICE O/P EST MOD 30 MIN: CPT | Mod: S$GLB,,,

## 2023-12-27 PROCEDURE — 3044F PR MOST RECENT HEMOGLOBIN A1C LEVEL <7.0%: ICD-10-PCS | Mod: CPTII,S$GLB,,

## 2023-12-27 PROCEDURE — 3288F PR FALLS RISK ASSESSMENT DOCUMENTED: ICD-10-PCS | Mod: CPTII,S$GLB,,

## 2023-12-27 PROCEDURE — 3078F DIAST BP <80 MM HG: CPT | Mod: CPTII,S$GLB,,

## 2023-12-27 PROCEDURE — 3008F BODY MASS INDEX DOCD: CPT | Mod: CPTII,S$GLB,,

## 2023-12-27 PROCEDURE — 1159F PR MEDICATION LIST DOCUMENTED IN MEDICAL RECORD: ICD-10-PCS | Mod: CPTII,S$GLB,,

## 2023-12-27 PROCEDURE — 4010F ACE/ARB THERAPY RXD/TAKEN: CPT | Mod: CPTII,S$GLB,,

## 2023-12-27 PROCEDURE — 1100F PR PT FALLS ASSESS DOC 2+ FALLS/FALL W/INJURY/YR: ICD-10-PCS | Mod: CPTII,S$GLB,,

## 2023-12-27 PROCEDURE — 99214 PR OFFICE/OUTPT VISIT, EST, LEVL IV, 30-39 MIN: ICD-10-PCS | Mod: S$GLB,,,

## 2023-12-27 PROCEDURE — 1100F PTFALLS ASSESS-DOCD GE2>/YR: CPT | Mod: CPTII,S$GLB,,

## 2023-12-27 PROCEDURE — 3008F PR BODY MASS INDEX (BMI) DOCUMENTED: ICD-10-PCS | Mod: CPTII,S$GLB,,

## 2023-12-27 PROCEDURE — 3077F SYST BP >= 140 MM HG: CPT | Mod: CPTII,S$GLB,,

## 2023-12-27 PROCEDURE — 3044F HG A1C LEVEL LT 7.0%: CPT | Mod: CPTII,S$GLB,,

## 2023-12-27 PROCEDURE — 3078F PR MOST RECENT DIASTOLIC BLOOD PRESSURE < 80 MM HG: ICD-10-PCS | Mod: CPTII,S$GLB,,

## 2023-12-27 RX ORDER — TIZANIDINE 2 MG/1
4 TABLET ORAL EVERY 8 HOURS PRN
Qty: 30 TABLET | Refills: 0 | Status: SHIPPED | OUTPATIENT
Start: 2023-12-27 | End: 2024-01-06
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
## 2023-12-28 ENCOUNTER — HOSPITAL ENCOUNTER (OUTPATIENT)
Dept: RADIOLOGY | Facility: HOSPITAL | Age: 68
Discharge: HOME | End: 2023-12-28
Payer: MEDICARE

## 2023-12-28 DIAGNOSIS — M54.50 LUMBAR PAIN: ICD-10-CM

## 2023-12-28 DIAGNOSIS — M54.2 CERVICAL PAIN: ICD-10-CM

## 2023-12-28 PROCEDURE — 72040 X-RAY EXAM NECK SPINE 2-3 VW: CPT | Mod: 26,,, | Performed by: RADIOLOGY

## 2023-12-28 PROCEDURE — 72040 XR CERVICAL SPINE AP LATERAL: ICD-10-PCS | Mod: 26,,, | Performed by: RADIOLOGY

## 2023-12-28 PROCEDURE — 72100 X-RAY EXAM L-S SPINE 2/3 VWS: CPT | Mod: 26,,, | Performed by: RADIOLOGY

## 2023-12-28 PROCEDURE — 72100 XR LUMBAR SPINE AP AND LATERAL: ICD-10-PCS | Mod: 26,,, | Performed by: RADIOLOGY

## 2023-12-28 PROCEDURE — 72040 X-RAY EXAM NECK SPINE 2-3 VW: CPT | Mod: TC,FY,PO

## 2023-12-28 PROCEDURE — 72100 X-RAY EXAM L-S SPINE 2/3 VWS: CPT | Mod: TC,FY,PO

## 2024-01-08 ENCOUNTER — TELEPHONE (OUTPATIENT)
Dept: FAMILY MEDICINE | Facility: CLINIC | Age: 69
End: 2024-01-08
Payer: MEDICARE
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
## 2024-01-18 ENCOUNTER — HOSPITAL ENCOUNTER (EMERGENCY)
Facility: HOSPITAL | Age: 69
Discharge: HOME | End: 2024-01-18
Attending: EMERGENCY MEDICINE
Payer: MEDICARE

## 2024-01-18 VITALS
DIASTOLIC BLOOD PRESSURE: 82 MMHG | HEIGHT: 66 IN | SYSTOLIC BLOOD PRESSURE: 154 MMHG | RESPIRATION RATE: 16 BRPM | OXYGEN SATURATION: 98 % | WEIGHT: 155 LBS | TEMPERATURE: 98 F | HEART RATE: 62 BPM | BODY MASS INDEX: 24.91 KG/M2

## 2024-01-18 DIAGNOSIS — M79.89 RIGHT LEG SWELLING: ICD-10-CM

## 2024-01-18 DIAGNOSIS — R60.0 EDEMA OF RIGHT FOOT: ICD-10-CM

## 2024-01-18 PROCEDURE — 25000003 PHARM REV CODE 250: Performed by: PHYSICIAN ASSISTANT

## 2024-01-18 PROCEDURE — 99284 EMERGENCY DEPT VISIT MOD MDM: CPT | Mod: 25

## 2024-01-18 PROCEDURE — 63600175 PHARM REV CODE 636 W HCPCS: Performed by: PHYSICIAN ASSISTANT

## 2024-01-18 RX ORDER — PREDNISONE 20 MG/1
40 TABLET ORAL DAILY
Qty: 8 TABLET | Refills: 0 | Status: SHIPPED | OUTPATIENT
Start: 2024-01-19 | End: 2024-01-23

## 2024-01-18 RX ORDER — ACETAMINOPHEN 500 MG
1000 TABLET ORAL
Status: COMPLETED | OUTPATIENT
Start: 2024-01-18 | End: 2024-01-18

## 2024-01-18 RX ORDER — PREDNISONE 20 MG/1
40 TABLET ORAL
Status: COMPLETED | OUTPATIENT
Start: 2024-01-18 | End: 2024-01-18

## 2024-01-18 RX ADMIN — ACETAMINOPHEN 1000 MG: 500 TABLET ORAL at 08:01

## 2024-01-18 RX ADMIN — PREDNISONE 40 MG: 20 TABLET ORAL at 10:01
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
## 2024-01-18 NOTE — DISCHARGE INSTRUCTIONS
You do not have a blood clot. You do not have a broken bone in your ankle.   You were started on steroids today. Continue to take once a day starting tomorrow for the next 4 days to complete a 5 day treatment.   You can take acetaminophen/tylenol 650 mg every 6 hours or 1000 mg every 8 hours for added relief.  Apply ice to the area for 10-20 minutes every 4 hours. You can apply heat 2 days after for the same duration and frequency.  Ace wrap to compress and help with edema.   Elevate your leg as much as possible to help with swelling.  Follow up with PCP in 10 days or ER for repeat US if you continue to have leg swelling.  Return to the ER for new or worsening symptoms.  No future appointments.  Imaging Results              X-Ray Foot Complete Right (Final result)  Result time 01/18/24 11:51:27      Final result by Tacos Luna MD (01/18/24 11:51:27)                   Impression:      Soft tissue edema and degenerative changes.  No acute displaced fracture.      Electronically signed by: Tacos Luna MD  Date:    01/18/2024  Time:    11:51               Narrative:    EXAMINATION:  XR FOOT COMPLETE 3 VIEW RIGHT    CLINICAL HISTORY:  Localized edema.    TECHNIQUE:  AP, lateral, and oblique views of the right foot were performed.    COMPARISON:  None.    FINDINGS:  No acute displaced fracture.  No dislocation.  Moderate degenerative changes in the midfoot.  Plantar calcaneal spur.  Soft tissue edema most notable along the dorsal aspect of the foot.  No unexpected radiopaque foreign body.                                       US Lower Extremity Veins Right (Final result)  Result time 01/18/24 09:52:18      Final result by Tacho Lou MD (01/18/24 09:52:18)                   Impression:      No evidence of deep venous thrombosis in the right lower extremity.      Electronically signed by: Tacho Lou MD  Date:    01/18/2024  Time:    09:52               Narrative:    EXAMINATION:  US LOWER EXTREMITY  VEINS RIGHT    CLINICAL HISTORY:  Other specified soft tissue disorders    TECHNIQUE:  Duplex and color flow Doppler evaluation and graded compression of the right lower extremity veins was performed.    COMPARISON:  None    FINDINGS:  Right thigh veins: The common femoral, femoral, popliteal, upper greater saphenous, and deep femoral veins are patent and free of thrombus. The veins are normally compressible and have normal phasic flow and augmentation response.    Right calf veins: The visualized calf veins are patent.    Contralateral CFV: The contralateral (left) common femoral vein is patent and free of thrombus.    Miscellaneous: None

## 2024-03-04 ENCOUNTER — PATIENT MESSAGE (OUTPATIENT)
Dept: ADMINISTRATIVE | Facility: HOSPITAL | Age: 69
End: 2024-03-04
Payer: MEDICARE

## 2024-04-22 ENCOUNTER — HOSPITAL ENCOUNTER (EMERGENCY)
Facility: HOSPITAL | Age: 69
Discharge: HOME | End: 2024-04-22
Attending: EMERGENCY MEDICINE
Payer: MEDICARE

## 2024-04-22 VITALS
HEART RATE: 69 BPM | BODY MASS INDEX: 23.54 KG/M2 | WEIGHT: 150 LBS | DIASTOLIC BLOOD PRESSURE: 100 MMHG | HEIGHT: 67 IN | SYSTOLIC BLOOD PRESSURE: 202 MMHG | OXYGEN SATURATION: 97 % | RESPIRATION RATE: 20 BRPM | TEMPERATURE: 98 F

## 2024-04-22 DIAGNOSIS — I10 ESSENTIAL HYPERTENSION: Chronic | ICD-10-CM

## 2024-04-22 DIAGNOSIS — R03.0 ELEVATED BLOOD PRESSURE READING: Primary | ICD-10-CM

## 2024-04-22 DIAGNOSIS — I10 HYPERTENSION, UNSPECIFIED TYPE: ICD-10-CM

## 2024-04-22 LAB
BUN SERPL-MCNC: 19 MG/DL (ref 6–30)
CHLORIDE SERPL-SCNC: 105 MMOL/L (ref 95–110)
CREAT SERPL-MCNC: 1.1 MG/DL (ref 0.5–1.4)
GLUCOSE SERPL-MCNC: 95 MG/DL (ref 70–110)
HCT VFR BLD CALC: 37 %PCV (ref 36–54)
POC IONIZED CALCIUM: 1.35 MMOL/L (ref 1.06–1.42)
POC TCO2 (MEASURED): 28 MMOL/L (ref 23–29)
POTASSIUM BLD-SCNC: 3.5 MMOL/L (ref 3.5–5.1)
SAMPLE: NORMAL
SODIUM BLD-SCNC: 141 MMOL/L (ref 136–145)

## 2024-04-22 PROCEDURE — 99284 EMERGENCY DEPT VISIT MOD MDM: CPT

## 2024-04-22 PROCEDURE — 25000003 PHARM REV CODE 250: Performed by: EMERGENCY MEDICINE

## 2024-04-22 RX ORDER — AMLODIPINE BESYLATE 10 MG/1
10 TABLET ORAL DAILY
Qty: 30 TABLET | Refills: 0 | Status: SHIPPED | OUTPATIENT
Start: 2024-04-22 | End: 2024-05-23

## 2024-04-22 RX ORDER — LISINOPRIL 40 MG/1
40 TABLET ORAL DAILY
Qty: 30 TABLET | Refills: 0 | Status: SHIPPED | OUTPATIENT
Start: 2024-04-22 | End: 2024-05-23

## 2024-04-22 RX ORDER — LISINOPRIL 20 MG/1
40 TABLET ORAL
Status: COMPLETED | OUTPATIENT
Start: 2024-04-22 | End: 2024-04-22

## 2024-04-22 RX ORDER — AMLODIPINE BESYLATE 10 MG/1
10 TABLET ORAL
Status: COMPLETED | OUTPATIENT
Start: 2024-04-22 | End: 2024-04-22

## 2024-04-22 RX ADMIN — LISINOPRIL 40 MG: 20 TABLET ORAL at 02:04

## 2024-04-22 RX ADMIN — AMLODIPINE BESYLATE 10 MG: 10 TABLET ORAL at 02:04

## 2024-04-22 NOTE — ED PROVIDER NOTES
Encounter Date: 4/22/2024       History     Chief Complaint   Patient presents with    Hypertension     Ran out of meds 3 d ago     68-year-old male with a history of hypertension presents with hypertension.  The pharmacy would not fill his medications for the last 2 days.  Today he denies nausea, vomiting, diarrhea, fever, cough, shortness of breath, chest pain, abdominal pain, or dysuria.  The patients available PMH, PSH, Social History, medications, allergies, and triage vital signs were reviewed just prior to their medical evaluation.            Review of patient's allergies indicates:   Allergen Reactions    Lactose intolerance  [lactase]      Other reaction(s): Diarrhea    Penicillins Other (See Comments)     Swelling of lymph nodes     Past Medical History:   Diagnosis Date    Allergy     Back pain     Hypertension     Mixed hyperlipidemia 7/3/2018    Obesity     Reflux     Sleep apnea 6/21/2013     Past Surgical History:   Procedure Laterality Date    CHOLECYSTECTOMY  2012    COLONOSCOPY N/A 1/3/2023    Procedure: COLONOSCOPY;  Surgeon: Kiera Fonseca MD;  Location: [facility];  Service: Endoscopy;  Laterality: N/A;    ESOPHAGOGASTRODUODENOSCOPY N/A 1/3/2023    Procedure: EGD (ESOPHAGOGASTRODUODENOSCOPY);  Surgeon: Kiera Fonseca MD;  Location: [facility];  Service: Endoscopy;  Laterality: N/A;    HERNIA REPAIR      RHINOPLASTY       Family History   Problem Relation Name Age of Onset    Cancer Mother  65        exposure related    Heart disease Father      Colon cancer Neg Hx      Esophageal cancer Neg Hx      Diabetes Neg Hx       Social History     Tobacco Use    Smoking status: Never    Smokeless tobacco: Never   Substance Use Topics    Alcohol use: Yes     Alcohol/week: 3.0 standard drinks of alcohol     Types: 3 Cans of beer per week     Comment: 3 times per week    Drug use: Yes     Types: Marijuana     Comment: smoked last yuesterday     Review of Systems   Constitutional:  Negative for  fever.   Respiratory:  Negative for cough and shortness of breath.    Cardiovascular:  Negative for chest pain.   Gastrointestinal:  Negative for abdominal pain, diarrhea, nausea and vomiting.   Genitourinary:  Negative for dysuria.       Physical Exam     Initial Vitals [04/22/24 1250]   BP Pulse Resp Temp SpO2   (!) 200/97 69 20 98.2 °F (36.8 °C) 97 %      MAP       --         Physical Exam    Nursing note and vitals reviewed.  Constitutional: He appears well-developed and well-nourished. He is not diaphoretic. No distress.   HENT:   Head: Normocephalic and atraumatic.   Nose: Nose normal.   Eyes: Conjunctivae are normal. Right eye exhibits no discharge. Left eye exhibits no discharge.   Neck: Neck supple.   Normal range of motion.  Cardiovascular:  Normal rate, regular rhythm and normal heart sounds.     Exam reveals no gallop and no friction rub.       No murmur heard.  Pulmonary/Chest: Breath sounds normal. No respiratory distress. He has no wheezes. He has no rhonchi. He has no rales.   Abdominal: He exhibits no distension.   Musculoskeletal:         General: No tenderness or edema. Normal range of motion.      Cervical back: Normal range of motion and neck supple.     Neurological: He is alert and oriented to person, place, and time. GCS score is 15. GCS eye subscore is 4. GCS verbal subscore is 5. GCS motor subscore is 6.   Skin: Skin is warm and dry. No rash noted. No erythema.   Psychiatric: He has a normal mood and affect. His behavior is normal. Judgment and thought content normal.         ED Course   Procedures  Labs Reviewed   ISTAT PROCEDURE   ISTAT CHEM8          Imaging Results    None          Medications   amLODIPine tablet 10 mg (10 mg Oral Given 4/22/24 1428)   lisinopriL tablet 40 mg (40 mg Oral Given 4/22/24 1427)     Medical Decision Making  68-year-old male presents with asymptomatic hypertension.  Vitals confirm.  Physical exam benign.  Labs unremarkable.  Doubt hypertensive emergency.   Given dose of home meds here.  Will refill.  He will go see his PCP for future refills.  Patient will return to ED for worsening symptoms, inability to eat/drink, fever greater than 100.4, or any other concerns. Did bedside teaching with return precautions.  All questions answered.  The patient acknowledges understanding.  Gave verbal discharge instructions.     Amount and/or Complexity of Data Reviewed  Labs: ordered. Decision-making details documented in ED Course.    Risk  Prescription drug management.                                      Clinical Impression:  Final diagnoses:  [R03.0] Elevated blood pressure reading (Primary)  [I10] Hypertension, unspecified type          ED Disposition Condition    Discharge Stable          ED Prescriptions       Medication Sig Dispense Start Date End Date Auth. Provider    amLODIPine (NORVASC) 10 MG tablet Take 1 tablet (10 mg total) by mouth once daily. 30 tablet 4/22/2024 5/22/2024 Fidel Ferrell MD    lisinopriL (PRINIVIL,ZESTRIL) 40 MG tablet Take 1 tablet (40 mg total) by mouth once daily. 30 tablet 4/22/2024 5/22/2024 Fidel Ferrell MD          Follow-up Information       Follow up With Specialties Details Why Contact Info    Follow up with primary physician as soon as possible.  Call tomorrow for an appointment.        [facility] Emergency Medicine  Return to ED for worsening symptoms, inability to eat/drink, fever greater than 100.4, or any other concerns. [address]  [facility] [address]  [phone]             Fidel Ferrell MD  04/22/24 8238

## 2024-04-22 NOTE — ED NOTES
I-STAT Chem-8+ Results:   Value Reference Range   Sodium 141 136-145 mmol/L   Potassium  3.5 3.5-5.1 mmol/L   Chloride 105  mmol/L   Ionized Calcium 1.35 1.06-1.42 mmol/L   CO2 (measured) 28 23-29 mmol/L   Glucose 95  mg/dL   BUN 19 6-30 mg/dL   Creatinine 1.1 0.5-1.4 mg/dL   Hematocrit 37 36-54%       
Pt left before repeat VS could be acquired. Dr. Ferrell aware.   
Unknown

## 2024-04-22 NOTE — FIRST PROVIDER EVALUATION
"Medical screening examination initiated.  I have conducted a focused provider triage encounter, findings are as follows:    Brief history of present illness:  68 M here for elevated BP, been out of meds x 2 days.  Can't get it from pharmacy until tomorrow. No chest pain, headache, sob.     Vitals:    04/22/24 1250   Pulse: 69   Resp: 20   Temp: 98.2 °F (36.8 °C)   TempSrc: Oral   SpO2: 97%   Weight: 68 kg (150 lb)   Height: 5' 6.5" (1.689 m)       Pertinent physical exam:  well appearing.    Brief workup plan:  deferred.    Preliminary workup initiated; this workup will be continued and followed by the physician or advanced practice provider that is assigned to the patient when roomed.  "

## 2024-04-22 NOTE — DISCHARGE INSTRUCTIONS
Take your home medications as prescribed.    Our goal in the emergency department is to always give you outstanding care and exceptional service. You may receive a survey by mail or e-mail in the next week regarding your experience in our ED. We would greatly appreciate your completing and returning the survey. Your feedback provides us with a way to recognize our staff who give very good care and it helps us learn how to improve when your experience was below our aspiration of excellence.

## 2024-04-22 NOTE — ED TRIAGE NOTES
Isreal Randolph, a 68 y.o. male presents to the ED w/ complaint of HTN states he's out of meds.     Triage note:  Chief Complaint   Patient presents with    Hypertension     Ran out of meds 3 d ago     Review of patient's allergies indicates:   Allergen Reactions    Lactose intolerance  [lactase]      Other reaction(s): Diarrhea    Penicillins Other (See Comments)     Swelling of lymph nodes     Past Medical History:   Diagnosis Date    Allergy     Back pain     Hypertension     Mixed hyperlipidemia 7/3/2018    Obesity     Reflux     Sleep apnea 6/21/2013        APPEARANCE: awake and alert in NAD. PAIN  0/10  SKIN: warm, dry and intact. No breakdown or bruising.  MUSCULOSKELETAL: Patient moving all extremities spontaneously, no obvious swelling or deformities noted. Ambulates independently.  RESPIRATORY: Reports shortness of breath.Respirations unlabored.   CARDIAC: Denies CP, 2+ distal pulses; no peripheral edema  ABDOMEN: S/ND/NT, Denies nausea  : voids spontaneously, denies difficulty  Neurologic: AAO x 4; follows commands equal strength in all extremities; denies numbness/tingling. Denies dizziness

## 2024-06-05 ENCOUNTER — PATIENT MESSAGE (OUTPATIENT)
Dept: ADMINISTRATIVE | Facility: HOSPITAL | Age: 69
End: 2024-06-05
Payer: MEDICARE

## 2024-07-18 ENCOUNTER — PATIENT OUTREACH (OUTPATIENT)
Dept: ADMINISTRATIVE | Facility: HOSPITAL | Age: 69
End: 2024-07-18
Payer: MEDICARE

## 2024-07-23 DIAGNOSIS — I10 ESSENTIAL HYPERTENSION: Chronic | ICD-10-CM

## 2024-07-23 DIAGNOSIS — I10 HYPERTENSION, UNSPECIFIED TYPE: ICD-10-CM

## 2024-07-23 RX ORDER — LISINOPRIL 40 MG/1
40 TABLET ORAL DAILY
Qty: 30 TABLET | Refills: 0 | Status: SHIPPED | OUTPATIENT
Start: 2024-07-23 | End: 2024-08-22

## 2024-07-23 NOTE — TELEPHONE ENCOUNTER
Care Due:                  Date            Visit Type   Department     Provider  --------------------------------------------------------------------------------                                [facility]                              PRIMARY      MED/ INTERNAL  Last Visit: 08-      CARE ([facility])   MED/ PEDS      MARCELINA LOZANO                              [facility]                              PRIMARY      MED/ INTERNAL  Next Visit: 08-      CARE ([facility])   MED/ PEDS      MARCELINA LOZANO                                                            Last  Test          Frequency    Reason                     Performed    Due Date  --------------------------------------------------------------------------------    CMP.........  12 months..  atorvastatin.............  09-   09-    Lipid Panel.  12 months..  atorvastatin.............  08- 08-    [facility] Embedded Care Due Messages. Reference number: [id number].   7/23/2024 3:48:33 PM CDT

## 2024-07-23 NOTE — TELEPHONE ENCOUNTER
----- Message from Guicho Link sent at 7/23/2024  7:20 AM CDT -----  Regarding: Isreal  Type: RX Refill Request     Who Called:Isreal      Have you contacted your pharmacy: Yes     Refill or New Rx:Refill      RX Name and Strength: lisinopriL (PRINIVIL,ZESTRIL) 40 MG tablet// stated that the doctor forgot to send in the this prescription to the pharmacy. Please contact the pharmacy.     Preferred Pharmacy with phone number:.  [id number] [place], [place] - [address] [facility] [address]  Phone: [phone] Fax: [phone]     Local or Mail Order: Local     Ordering Provider: Dr. Jamaal Tellez     Would the patient rather a call back or a response via [facility]? Callback      Best Call Back Number: [phone]      Additional Information:

## 2024-08-20 DIAGNOSIS — I10 ESSENTIAL HYPERTENSION: Chronic | ICD-10-CM

## 2024-08-20 NOTE — TELEPHONE ENCOUNTER
----- Message from Tess De La O sent at 8/20/2024  2:02 PM CDT -----  Regarding: Refill Request  Type: RX Refill Request      Who Called: Self       Refill or New Rx: refill       RX Name and Strength:  Disp Refills Start End ASHLEY  lisinopriL (PRINIVIL,ZESTRIL) 40 MG tablet           Preferred PPharmacy      [facility] [address] [place] [address] [address]          Would the patient rather a call back or a response via [facility]? Call       Best Call Back Number: .[phone]

## 2024-08-20 NOTE — TELEPHONE ENCOUNTER
No care due was identified.  [facility] Embedded Care Due Messages. Reference number: [id number].   8/20/2024 5:01:18 PM CDT

## 2024-08-21 RX ORDER — LISINOPRIL 40 MG/1
40 TABLET ORAL DAILY
Qty: 30 TABLET | Refills: 0 | Status: SHIPPED | OUTPATIENT
Start: 2024-08-21 | End: 2024-09-20

## 2024-09-03 ENCOUNTER — PATIENT OUTREACH (OUTPATIENT)
Dept: ADMINISTRATIVE | Facility: HOSPITAL | Age: 69
End: 2024-09-03
Payer: MEDICARE

## 2024-09-05 DIAGNOSIS — R73.03 PREDIABETES: ICD-10-CM

## 2024-09-18 DIAGNOSIS — I10 ESSENTIAL HYPERTENSION: ICD-10-CM

## 2024-09-19 ENCOUNTER — OFFICE VISIT (OUTPATIENT)
Dept: FAMILY MEDICINE | Facility: CLINIC | Age: 69
End: 2024-09-19
Payer: MEDICARE

## 2024-09-19 VITALS
DIASTOLIC BLOOD PRESSURE: 82 MMHG | SYSTOLIC BLOOD PRESSURE: 152 MMHG | OXYGEN SATURATION: 98 % | BODY MASS INDEX: 23.73 KG/M2 | WEIGHT: 149.25 LBS | HEART RATE: 56 BPM | TEMPERATURE: 98 F

## 2024-09-19 DIAGNOSIS — I10 HYPERTENSION, UNSPECIFIED TYPE: ICD-10-CM

## 2024-09-19 DIAGNOSIS — I70.0 AORTIC ATHEROSCLEROSIS: ICD-10-CM

## 2024-09-19 DIAGNOSIS — I10 ESSENTIAL HYPERTENSION: Primary | Chronic | ICD-10-CM

## 2024-09-19 DIAGNOSIS — M54.2 CERVICAL PAIN: ICD-10-CM

## 2024-09-19 PROCEDURE — 1160F RVW MEDS BY RX/DR IN RCRD: CPT | Mod: CPTII,S$GLB,,

## 2024-09-19 PROCEDURE — 3008F BODY MASS INDEX DOCD: CPT | Mod: CPTII,S$GLB,,

## 2024-09-19 PROCEDURE — 99999 PR PBB SHADOW E&M-EST. PATIENT-LVL IV: CPT | Mod: PBBFAC,,,

## 2024-09-19 PROCEDURE — 1125F AMNT PAIN NOTED PAIN PRSNT: CPT | Mod: CPTII,S$GLB,,

## 2024-09-19 PROCEDURE — 1159F MED LIST DOCD IN RCRD: CPT | Mod: CPTII,S$GLB,,

## 2024-09-19 PROCEDURE — 99213 OFFICE O/P EST LOW 20 MIN: CPT | Mod: S$GLB,,,

## 2024-09-19 PROCEDURE — 3077F SYST BP >= 140 MM HG: CPT | Mod: CPTII,S$GLB,,

## 2024-09-19 PROCEDURE — 4010F ACE/ARB THERAPY RXD/TAKEN: CPT | Mod: CPTII,S$GLB,,

## 2024-09-19 PROCEDURE — 3079F DIAST BP 80-89 MM HG: CPT | Mod: CPTII,S$GLB,,

## 2024-09-19 RX ORDER — AMLODIPINE BESYLATE 10 MG/1
10 TABLET ORAL DAILY
Qty: 90 TABLET | Refills: 1 | Status: SHIPPED | OUTPATIENT
Start: 2024-09-19

## 2024-09-19 RX ORDER — LISINOPRIL 40 MG/1
40 TABLET ORAL DAILY
Qty: 90 TABLET | Refills: 1 | Status: SHIPPED | OUTPATIENT
Start: 2024-09-19

## 2024-09-19 NOTE — PROGRESS NOTES
HPI     Chief Complaint:  Chief Complaint   Patient presents with    Hypertension     Back pain medication refill         Isreal Randolph is a 68 y.o. male with multiple medical diagnoses as listed in the medical history and problem list that presents for   Chief Complaint   Patient presents with    Hypertension     Back pain medication refill      .     Patient is know to me with his last appointment with me on Visit date not found.     HPI  Pt presents for medication refills.  Has been out of BP medication for about 2 days.  Requesting handicap tag.      Assessment & Plan     Problem List Items Addressed This Visit          Cardiac/Vascular    Essential hypertension - Primary (Chronic)  BP in clinic today 160/92 recheck 152/82.  Has been out of BP medication for about 2 days.  Will refill Amlodipine and Lisinopril.      Relevant Medications    amLODIPine (NORVASC) 10 MG tablet    lisinopriL (PRINIVIL,ZESTRIL) 40 MG tablet    Aortic atherosclerosis  Stable. The current medical regimen is effective;  continue present plan and medications.       Other Visit Diagnoses       Hypertension, unspecified type      BP in clinic today 160/92 recheck 152/82.  Has been out of BP medication for about 2 days.  Will refill Amlodipine and Lisinopril.    Relevant Medications    amLODIPine (NORVASC) 10 MG tablet    Cervical pain      Chronic neck pain managed with tramadol.  Neck and back pain makes it difficult to walk long distances. Will complete handicap form for one year.              --------------------------------------------      Health Maintenance:  Health Maintenance         Date Due Completion Date    RSV Vaccine (Age 60+ and Pregnant patients) (1 - 1-dose 60+ series) Never done ---    Shingles Vaccine (2 of 2) 06/21/2021 4/26/2021    Pneumococcal Vaccines (Age 65+) (3 of 3 - PPSV23 or PCV20) 11/05/2023 11/18/2019    Hemoglobin A1c (Prediabetes) 08/28/2024 8/28/2023    Influenza Vaccine (1) 09/01/2024 11/29/2022     COVID-19 Vaccine (7 - 2023-24 season) 09/01/2024 8/1/2022    High Dose Statin 12/27/2024 12/27/2023    Colorectal Cancer Screening 01/03/2028 1/3/2023    Override on 4/17/2006: Done    TETANUS VACCINE 07/03/2028 7/3/2018 (Done)    Override on 7/3/2018: Done    Lipid Panel 08/28/2028 8/28/2023    Override on 7/3/2018: Done    Override on 3/19/2010: Done            Discussed the importance of overdue vaccines which were offered during this encounter. Patient declined overdue vaccines at this time and Health maintenance reviewed    Follow Up:  No follow-ups on file.    Exam     Review of Systems:  (as noted above)  Review of Systems    Physical Exam:   Physical Exam  Constitutional:       General: He is not in acute distress.     Appearance: Normal appearance. He is not ill-appearing.   Cardiovascular:      Rate and Rhythm: Normal rate.   Pulmonary:      Effort: Pulmonary effort is normal.   Neurological:      Mental Status: He is alert.       Vitals:    09/19/24 1024 09/19/24 1046   BP: (!) 160/92 (!) 152/82   BP Location: Left arm Left arm   Patient Position: Lying Sitting   BP Method: Medium (Manual) Medium (Manual)   Pulse: (!) 56    Temp: 98.2 °F (36.8 °C)    TempSrc: Oral    SpO2: 98%    Weight: 67.7 kg (149 lb 4 oz)       Body mass index is 23.73 kg/m².        History     Past Medical History:  Past Medical History:   Diagnosis Date    Allergy     Back pain     Hypertension     Mixed hyperlipidemia 7/3/2018    Obesity     Reflux     Sleep apnea 6/21/2013       Past Surgical History:  Past Surgical History:   Procedure Laterality Date    CHOLECYSTECTOMY  2012    COLONOSCOPY N/A 1/3/2023    Procedure: COLONOSCOPY;  Surgeon: Kiera Fonseca MD;  Location: [facility];  Service: Endoscopy;  Laterality: N/A;    ESOPHAGOGASTRODUODENOSCOPY N/A 1/3/2023    Procedure: EGD (ESOPHAGOGASTRODUODENOSCOPY);  Surgeon: Kiera Fonseca MD;  Location: [facility];  Service: Endoscopy;  Laterality: N/A;    HERNIA REPAIR       RHINOPLASTY         Social History:  Social History     Socioeconomic History    Marital status:    Occupational History    Occupation:      Employer: [employer]     Comment: [employer]   Tobacco Use    Smoking status: Never    Smokeless tobacco: Never   Substance and Sexual Activity    Alcohol use: Yes     Alcohol/week: 3.0 standard drinks of alcohol     Types: 3 Cans of beer per week     Comment: 3 times per week    Drug use: Yes     Types: Marijuana     Comment: smoked last yuesterday    Sexual activity: Yes       Family History:  Family History   Problem Relation Name Age of Onset    Cancer Mother  65        exposure related    Heart disease Father      Colon cancer Neg Hx      Esophageal cancer Neg Hx      Diabetes Neg Hx         Allergies and Medications: (updated and reviewed)  Review of patient's allergies indicates:   Allergen Reactions    Lactose intolerance  [lactase]      Other reaction(s): Diarrhea    Penicillins Other (See Comments)     Swelling of lymph nodes     Current Outpatient Medications   Medication Sig Dispense Refill    cetirizine (ZYRTEC) 10 MG tablet Take 10 mg by mouth once daily.      naproxen (NAPROSYN) 500 MG tablet Take 1 tablet (500 mg total) by mouth 2 (two) times daily as needed. 30 tablet 0    traZODone (DESYREL) 50 MG tablet TAKE 1 TABLET BY MOUTH NIGHTLY AS NEEDED FOR INSOMNIA 30 tablet 3    amLODIPine (NORVASC) 10 MG tablet Take 1 tablet (10 mg total) by mouth once daily. 90 tablet 1    atorvastatin (LIPITOR) 40 MG tablet Take 1 tablet (40 mg total) by mouth once daily. (Patient not taking: Reported on 9/19/2024) 90 tablet 3    colestipoL (COLESTID) 1 gram Tab Take 1 tablet (1 g total) by mouth 2 (two) times daily. (Patient not taking: Reported on 9/19/2024) 60 tablet 2    famotidine (PEPCID) 20 MG tablet Take 1 tablet (20 mg total) by mouth 2 (two) times daily. (Patient not taking: Reported on 9/19/2024) 60 tablet 0    lisinopriL (PRINIVIL,ZESTRIL) 40 MG  tablet Take 1 tablet (40 mg total) by mouth once daily. 90 tablet 1    multivitamin (THERAGRAN) per tablet Take 1 tablet by mouth once daily. (Patient not taking: Reported on 9/19/2024)      omega-3 fatty acids 1,000 mg Cap Take 2 g by mouth. (Patient not taking: Reported on 9/19/2024)      omeprazole (PRILOSEC) 40 MG capsule Take 1 capsule (40 mg total) by mouth once daily. (Patient not taking: Reported on 9/19/2024) 30 capsule 0    traMADoL (ULTRAM) 50 mg tablet Take 1 tablet (50 mg total) by mouth every 12 (twelve) hours as needed for Pain. (Patient not taking: Reported on 9/19/2024) 14 tablet 0     No current facility-administered medications for this visit.       Patient Care Team:  Jamaal Tellez MD as PCP - General (Family Medicine)  Vaughn Kaiser MA as Care Coordinator         - The patient is given an After Visit Summary that lists all medications with directions, allergies, education, orders placed during this encounter and follow-up instructions.      - I have reviewed the patient's medical information including past medical, family, and social history sections including the medications and allergies.      - We discussed the patient's current medications.     This note was created by combination of typed  and MModal dictation.  Transcription errors may be present.  If there are any questions, please contact me.       Julianna Carolina NP

## 2024-10-03 ENCOUNTER — CLINICAL SUPPORT (OUTPATIENT)
Dept: FAMILY MEDICINE | Facility: CLINIC | Age: 69
End: 2024-10-03
Payer: MEDICARE

## 2024-10-03 VITALS — HEART RATE: 62 BPM | SYSTOLIC BLOOD PRESSURE: 134 MMHG | DIASTOLIC BLOOD PRESSURE: 68 MMHG

## 2024-10-03 DIAGNOSIS — I10 ESSENTIAL HYPERTENSION: Primary | ICD-10-CM

## 2024-10-03 PROCEDURE — 99999 PR PBB SHADOW E&M-EST. PATIENT-LVL I: CPT | Mod: PBBFAC,,,

## 2024-10-03 NOTE — PROGRESS NOTES
Isreal Randolph 68 y.o. male is here today for Blood Pressure check.   History of HTN yes.    Review of patient's allergies indicates:   Allergen Reactions    Lactose intolerance  [lactase]      Other reaction(s): Diarrhea    Penicillins Other (See Comments)     Swelling of lymph nodes     Creatinine   Date Value Ref Range Status   09/16/2023 1.1 0.5 - 1.4 mg/dL Final     Sodium   Date Value Ref Range Status   09/16/2023 140 136 - 145 mmol/L Final     Potassium   Date Value Ref Range Status   09/16/2023 3.6 3.5 - 5.1 mmol/L Final   ]  Patient verifies taking blood pressure medications on a regular basis at the same time of the day.     Current Outpatient Medications:     amLODIPine (NORVASC) 10 MG tablet, Take 1 tablet (10 mg total) by mouth once daily., Disp: 90 tablet, Rfl: 1    atorvastatin (LIPITOR) 40 MG tablet, Take 1 tablet (40 mg total) by mouth once daily. (Patient not taking: Reported on 9/19/2024), Disp: 90 tablet, Rfl: 3    cetirizine (ZYRTEC) 10 MG tablet, Take 10 mg by mouth once daily., Disp: , Rfl:     colestipoL (COLESTID) 1 gram Tab, Take 1 tablet (1 g total) by mouth 2 (two) times daily. (Patient not taking: Reported on 9/19/2024), Disp: 60 tablet, Rfl: 2    famotidine (PEPCID) 20 MG tablet, Take 1 tablet (20 mg total) by mouth 2 (two) times daily. (Patient not taking: Reported on 9/19/2024), Disp: 60 tablet, Rfl: 0    lisinopriL (PRINIVIL,ZESTRIL) 40 MG tablet, Take 1 tablet (40 mg total) by mouth once daily., Disp: 90 tablet, Rfl: 1    multivitamin (THERAGRAN) per tablet, Take 1 tablet by mouth once daily. (Patient not taking: Reported on 9/19/2024), Disp: , Rfl:     naproxen (NAPROSYN) 500 MG tablet, Take 1 tablet (500 mg total) by mouth 2 (two) times daily as needed., Disp: 30 tablet, Rfl: 0    omega-3 fatty acids 1,000 mg Cap, Take 2 g by mouth. (Patient not taking: Reported on 9/19/2024), Disp: , Rfl:     omeprazole (PRILOSEC) 40 MG capsule, Take 1 capsule (40 mg total) by mouth once daily.  (Patient not taking: Reported on 9/19/2024), Disp: 30 capsule, Rfl: 0    traMADoL (ULTRAM) 50 mg tablet, Take 1 tablet (50 mg total) by mouth every 12 (twelve) hours as needed for Pain. (Patient not taking: Reported on 9/19/2024), Disp: 14 tablet, Rfl: 0    traZODone (DESYREL) 50 MG tablet, TAKE 1 TABLET BY MOUTH NIGHTLY AS NEEDED FOR INSOMNIA, Disp: 30 tablet, Rfl: 3  Does patient have record of home blood pressure readings no.   Last dose of blood pressure medication was taken at 5 am.  Patient is asymptomatic.     BP: 134/68 , Pulse: 62 .    Dr. Tellez notified.

## 2024-11-13 DIAGNOSIS — I10 ESSENTIAL HYPERTENSION: Chronic | ICD-10-CM

## 2024-11-13 NOTE — TELEPHONE ENCOUNTER
Care Due:                  Date            Visit Type   Department     Provider  --------------------------------------------------------------------------------                                [facility]                              PRIMARY      MED/ INTERNAL  Last Visit: 08-      CARE ([facility])   MED/ PEDS      MARCELINA LOZANO                              [facility]                              PRIMARY      MED/ INTERNAL  Next Visit: 02-      CARE ([facility])   MED/ PEDS      MARCELINA LOZANO                                                            Last  Test          Frequency    Reason                     Performed    Due Date  --------------------------------------------------------------------------------    CMP.........  12 months..  atorvastatin.............  09-   09-    Lipid Panel.  12 months..  atorvastatin.............  08- 08-    Health [facility] Embedded Care Due Messages. Reference number: [id number].   11/13/2024 3:28:05 PM CST

## 2024-11-14 RX ORDER — LISINOPRIL 40 MG/1
40 TABLET ORAL DAILY
Qty: 90 TABLET | Refills: 1 | OUTPATIENT
Start: 2024-11-14

## 2024-12-17 DIAGNOSIS — I10 HYPERTENSION, UNSPECIFIED TYPE: ICD-10-CM

## 2024-12-17 RX ORDER — AMLODIPINE BESYLATE 10 MG/1
10 TABLET ORAL DAILY
Qty: 90 TABLET | Refills: 1 | Status: SHIPPED | OUTPATIENT
Start: 2024-12-17

## 2024-12-17 NOTE — TELEPHONE ENCOUNTER
No care due was identified.  Health [facility] Embedded Care Due Messages. Reference number: [id number].   12/17/2024 11:34:17 AM CST

## 2024-12-17 NOTE — TELEPHONE ENCOUNTER
----- Message from Alisa sent at 12/17/2024  7:44 AM CST -----  Type: RX Refill Request    Who Called:  self     Have you contacted your pharmacy:  no     Refill or New Rx: refill    RX Name and Strength: amLODIPine (NORVASC) 10 MG tablet      Preferred Pharmacy with phone number: [facility] [address]   Phone: [phone]  Fax: [phone]          Local or Mail Order: local    Would the patient rather a call back or a response via My [facility]?  call    Best Call Back Number: .[phone] (home)      Additional Information:

## 2025-03-24 DIAGNOSIS — Z00.00 ENCOUNTER FOR MEDICARE ANNUAL WELLNESS EXAM: ICD-10-CM

## 2025-05-06 ENCOUNTER — PATIENT OUTREACH (OUTPATIENT)
Dept: ADMINISTRATIVE | Facility: HOSPITAL | Age: 70
End: 2025-05-06
Payer: MEDICARE

## 2025-06-24 DIAGNOSIS — I10 ESSENTIAL HYPERTENSION: Chronic | ICD-10-CM

## 2025-06-24 RX ORDER — LISINOPRIL 40 MG/1
40 TABLET ORAL
Qty: 30 TABLET | Refills: 0 | OUTPATIENT
Start: 2025-06-24

## 2025-06-24 NOTE — TELEPHONE ENCOUNTER
Care Due:                  Date            Visit Type   Department     Provider  --------------------------------------------------------------------------------                                [facility]                              PRIMARY      / INTERNAL MED  Last Visit: 08-      CARE ([facility])   / SHAYNA Tellez  Next Visit: None Scheduled  None         None Found                                                            Last  Test          Frequency    Reason                     Performed    Due Date  --------------------------------------------------------------------------------    Office Visit  15 months..  amLODIPine, atorvastatin,   08- 11-                             traZODone................    CMP.........  12 months..  atorvastatin.............  09-   09-    Lipid Panel.  12 months..  atorvastatin.............  08- 08-    Health [facility] Embedded Care Due Messages. Reference number: [id number].   6/24/2025 9:05:02 AM CDT

## (undated) DEVICE — TRAY FOLEY 16FR INFECTION CONT

## (undated) DEVICE — DISSECTOR SPACEMAKER + 10-12MM

## (undated) DEVICE — NDL HYPO REG 25G X 1 1/2

## (undated) DEVICE — ELECTRODE REM PLYHSV RETURN 9

## (undated) DEVICE — BLADE SURG CARBON STEEL SZ11

## (undated) DEVICE — TUBING HF INSUFFLATION W/ FLTR

## (undated) DEVICE — SEE MEDLINE ITEM 157148

## (undated) DEVICE — SOL NS 1000CC

## (undated) DEVICE — SEE MEDLINE ITEM 157117

## (undated) DEVICE — CLOSURE SKIN STERI STRIP 1/2X4

## (undated) DEVICE — ADHESIVE MASTISOL VIAL 48/BX

## (undated) DEVICE — STRAP SECURE 5MM

## (undated) DEVICE — SUT 0 VICRYL / UR6 (J603)

## (undated) DEVICE — DRAPE CORETEMP FLD WRM 56X62IN

## (undated) DEVICE — SUT MCRYL PLUS 4-0 PS2 27IN

## (undated) DEVICE — TRAY MINOR GEN SURG